# Patient Record
Sex: MALE | Race: BLACK OR AFRICAN AMERICAN | URBAN - METROPOLITAN AREA
[De-identification: names, ages, dates, MRNs, and addresses within clinical notes are randomized per-mention and may not be internally consistent; named-entity substitution may affect disease eponyms.]

---

## 2017-06-25 ENCOUNTER — INPATIENT (INPATIENT)
Facility: HOSPITAL | Age: 72
LOS: 7 days | Discharge: NURSING, REHAB OR LONG-TERM CARE | DRG: 65 | End: 2017-07-03
Attending: INTERNAL MEDICINE | Admitting: INTERNAL MEDICINE
Payer: COMMERCIAL

## 2017-06-25 VITALS
HEIGHT: 72 IN | SYSTOLIC BLOOD PRESSURE: 171 MMHG | OXYGEN SATURATION: 100 % | RESPIRATION RATE: 18 BRPM | DIASTOLIC BLOOD PRESSURE: 93 MMHG | HEART RATE: 72 BPM | WEIGHT: 210.1 LBS | TEMPERATURE: 99 F

## 2017-06-25 DIAGNOSIS — I63.9 CEREBRAL INFARCTION, UNSPECIFIED: ICD-10-CM

## 2017-06-25 DIAGNOSIS — I10 ESSENTIAL (PRIMARY) HYPERTENSION: ICD-10-CM

## 2017-06-25 DIAGNOSIS — Z29.9 ENCOUNTER FOR PROPHYLACTIC MEASURES, UNSPECIFIED: ICD-10-CM

## 2017-06-25 DIAGNOSIS — E11.9 TYPE 2 DIABETES MELLITUS WITHOUT COMPLICATIONS: ICD-10-CM

## 2017-06-25 LAB
ALBUMIN SERPL ELPH-MCNC: 3.5 G/DL — SIGNIFICANT CHANGE UP (ref 3.5–5)
ALP SERPL-CCNC: 85 U/L — SIGNIFICANT CHANGE UP (ref 40–120)
ALT FLD-CCNC: 19 U/L DA — SIGNIFICANT CHANGE UP (ref 10–60)
ANION GAP SERPL CALC-SCNC: 8 MMOL/L — SIGNIFICANT CHANGE UP (ref 5–17)
APPEARANCE UR: CLEAR — SIGNIFICANT CHANGE UP
APTT BLD: 31.8 SEC — SIGNIFICANT CHANGE UP (ref 27.5–37.4)
AST SERPL-CCNC: 16 U/L — SIGNIFICANT CHANGE UP (ref 10–40)
BASOPHILS # BLD AUTO: 0.1 K/UL — SIGNIFICANT CHANGE UP (ref 0–0.2)
BASOPHILS NFR BLD AUTO: 1.8 % — SIGNIFICANT CHANGE UP (ref 0–2)
BILIRUB SERPL-MCNC: 0.3 MG/DL — SIGNIFICANT CHANGE UP (ref 0.2–1.2)
BILIRUB UR-MCNC: NEGATIVE — SIGNIFICANT CHANGE UP
BUN SERPL-MCNC: 13 MG/DL — SIGNIFICANT CHANGE UP (ref 7–18)
CALCIUM SERPL-MCNC: 9.1 MG/DL — SIGNIFICANT CHANGE UP (ref 8.4–10.5)
CHLORIDE SERPL-SCNC: 107 MMOL/L — SIGNIFICANT CHANGE UP (ref 96–108)
CO2 SERPL-SCNC: 27 MMOL/L — SIGNIFICANT CHANGE UP (ref 22–31)
COLOR SPEC: YELLOW — SIGNIFICANT CHANGE UP
CREAT SERPL-MCNC: 1.02 MG/DL — SIGNIFICANT CHANGE UP (ref 0.5–1.3)
DIFF PNL FLD: ABNORMAL
EOSINOPHIL # BLD AUTO: 0.1 K/UL — SIGNIFICANT CHANGE UP (ref 0–0.5)
EOSINOPHIL NFR BLD AUTO: 2.4 % — SIGNIFICANT CHANGE UP (ref 0–6)
GLUCOSE SERPL-MCNC: 119 MG/DL — HIGH (ref 70–99)
GLUCOSE UR QL: NEGATIVE — SIGNIFICANT CHANGE UP
HCT VFR BLD CALC: 33.1 % — LOW (ref 39–50)
HGB BLD-MCNC: 11.6 G/DL — LOW (ref 13–17)
INR BLD: 1.04 RATIO — SIGNIFICANT CHANGE UP (ref 0.88–1.16)
KETONES UR-MCNC: NEGATIVE — SIGNIFICANT CHANGE UP
LEUKOCYTE ESTERASE UR-ACNC: NEGATIVE — SIGNIFICANT CHANGE UP
LYMPHOCYTES # BLD AUTO: 0.8 K/UL — LOW (ref 1–3.3)
LYMPHOCYTES # BLD AUTO: 13.8 % — SIGNIFICANT CHANGE UP (ref 13–44)
MCHC RBC-ENTMCNC: 32.2 PG — SIGNIFICANT CHANGE UP (ref 27–34)
MCHC RBC-ENTMCNC: 35.1 GM/DL — SIGNIFICANT CHANGE UP (ref 32–36)
MCV RBC AUTO: 91.7 FL — SIGNIFICANT CHANGE UP (ref 80–100)
MONOCYTES # BLD AUTO: 0.5 K/UL — SIGNIFICANT CHANGE UP (ref 0–0.9)
MONOCYTES NFR BLD AUTO: 8 % — SIGNIFICANT CHANGE UP (ref 2–14)
NEUTROPHILS # BLD AUTO: 4.5 K/UL — SIGNIFICANT CHANGE UP (ref 1.8–7.4)
NEUTROPHILS NFR BLD AUTO: 74.1 % — SIGNIFICANT CHANGE UP (ref 43–77)
NITRITE UR-MCNC: NEGATIVE — SIGNIFICANT CHANGE UP
PH UR: 7 — SIGNIFICANT CHANGE UP (ref 5–8)
PLATELET # BLD AUTO: 264 K/UL — SIGNIFICANT CHANGE UP (ref 150–400)
POTASSIUM SERPL-MCNC: 4 MMOL/L — SIGNIFICANT CHANGE UP (ref 3.5–5.3)
POTASSIUM SERPL-SCNC: 4 MMOL/L — SIGNIFICANT CHANGE UP (ref 3.5–5.3)
PROT SERPL-MCNC: 7.5 G/DL — SIGNIFICANT CHANGE UP (ref 6–8.3)
PROT UR-MCNC: 100
PROTHROM AB SERPL-ACNC: 11.3 SEC — SIGNIFICANT CHANGE UP (ref 9.8–12.7)
RBC # BLD: 3.61 M/UL — LOW (ref 4.2–5.8)
RBC # FLD: 13.1 % — SIGNIFICANT CHANGE UP (ref 10.3–14.5)
SODIUM SERPL-SCNC: 142 MMOL/L — SIGNIFICANT CHANGE UP (ref 135–145)
SP GR SPEC: 1.01 — SIGNIFICANT CHANGE UP (ref 1.01–1.02)
UROBILINOGEN FLD QL: NEGATIVE — SIGNIFICANT CHANGE UP
WBC # BLD: 6.1 K/UL — SIGNIFICANT CHANGE UP (ref 3.8–10.5)
WBC # FLD AUTO: 6.1 K/UL — SIGNIFICANT CHANGE UP (ref 3.8–10.5)

## 2017-06-25 PROCEDURE — 70450 CT HEAD/BRAIN W/O DYE: CPT | Mod: 26

## 2017-06-25 PROCEDURE — 99285 EMERGENCY DEPT VISIT HI MDM: CPT

## 2017-06-25 PROCEDURE — 99223 1ST HOSP IP/OBS HIGH 75: CPT | Mod: GC

## 2017-06-25 PROCEDURE — 71010: CPT | Mod: 26

## 2017-06-25 RX ORDER — INSULIN DETEMIR 100/ML (3)
24 INSULIN PEN (ML) SUBCUTANEOUS
Qty: 0 | Refills: 0 | Status: DISCONTINUED | OUTPATIENT
Start: 2017-06-25 | End: 2017-06-25

## 2017-06-25 RX ORDER — METFORMIN HYDROCHLORIDE 850 MG/1
1 TABLET ORAL
Qty: 0 | Refills: 0 | COMMUNITY

## 2017-06-25 RX ORDER — DEXTROSE 50 % IN WATER 50 %
12.5 SYRINGE (ML) INTRAVENOUS ONCE
Qty: 0 | Refills: 0 | Status: DISCONTINUED | OUTPATIENT
Start: 2017-06-25 | End: 2017-07-03

## 2017-06-25 RX ORDER — ATORVASTATIN CALCIUM 80 MG/1
40 TABLET, FILM COATED ORAL AT BEDTIME
Qty: 0 | Refills: 0 | Status: DISCONTINUED | OUTPATIENT
Start: 2017-06-25 | End: 2017-07-03

## 2017-06-25 RX ORDER — GLUCAGON INJECTION, SOLUTION 0.5 MG/.1ML
1 INJECTION, SOLUTION SUBCUTANEOUS ONCE
Qty: 0 | Refills: 0 | Status: DISCONTINUED | OUTPATIENT
Start: 2017-06-25 | End: 2017-07-03

## 2017-06-25 RX ORDER — DEXTROSE 50 % IN WATER 50 %
1 SYRINGE (ML) INTRAVENOUS ONCE
Qty: 0 | Refills: 0 | Status: DISCONTINUED | OUTPATIENT
Start: 2017-06-25 | End: 2017-07-03

## 2017-06-25 RX ORDER — INSULIN DETEMIR 100/ML (3)
24 INSULIN PEN (ML) SUBCUTANEOUS
Qty: 0 | Refills: 0 | COMMUNITY

## 2017-06-25 RX ORDER — ASPIRIN/CALCIUM CARB/MAGNESIUM 324 MG
81 TABLET ORAL DAILY
Qty: 0 | Refills: 0 | Status: DISCONTINUED | OUTPATIENT
Start: 2017-06-25 | End: 2017-06-25

## 2017-06-25 RX ORDER — GABAPENTIN 400 MG/1
300 CAPSULE ORAL EVERY 12 HOURS
Qty: 0 | Refills: 0 | Status: DISCONTINUED | OUTPATIENT
Start: 2017-06-25 | End: 2017-07-03

## 2017-06-25 RX ORDER — TAMSULOSIN HYDROCHLORIDE 0.4 MG/1
1 CAPSULE ORAL
Qty: 0 | Refills: 0 | COMMUNITY

## 2017-06-25 RX ORDER — AMLODIPINE BESYLATE 2.5 MG/1
10 TABLET ORAL DAILY
Qty: 0 | Refills: 0 | Status: DISCONTINUED | OUTPATIENT
Start: 2017-06-25 | End: 2017-07-01

## 2017-06-25 RX ORDER — INSULIN LISPRO 100/ML
VIAL (ML) SUBCUTANEOUS
Qty: 0 | Refills: 0 | Status: DISCONTINUED | OUTPATIENT
Start: 2017-06-25 | End: 2017-07-03

## 2017-06-25 RX ORDER — INSULIN GLARGINE 100 [IU]/ML
24 INJECTION, SOLUTION SUBCUTANEOUS
Qty: 0 | Refills: 0 | Status: DISCONTINUED | OUTPATIENT
Start: 2017-06-25 | End: 2017-07-03

## 2017-06-25 RX ORDER — CLOPIDOGREL BISULFATE 75 MG/1
1 TABLET, FILM COATED ORAL
Qty: 0 | Refills: 0 | COMMUNITY

## 2017-06-25 RX ORDER — GABAPENTIN 400 MG/1
0 CAPSULE ORAL
Qty: 0 | Refills: 0 | COMMUNITY

## 2017-06-25 RX ORDER — ENOXAPARIN SODIUM 100 MG/ML
40 INJECTION SUBCUTANEOUS DAILY
Qty: 0 | Refills: 0 | Status: DISCONTINUED | OUTPATIENT
Start: 2017-06-25 | End: 2017-07-03

## 2017-06-25 RX ORDER — FLUOXETINE HCL 10 MG
10 CAPSULE ORAL DAILY
Qty: 0 | Refills: 0 | Status: DISCONTINUED | OUTPATIENT
Start: 2017-06-25 | End: 2017-07-03

## 2017-06-25 RX ORDER — ASPIRIN/CALCIUM CARB/MAGNESIUM 324 MG
325 TABLET ORAL DAILY
Qty: 0 | Refills: 0 | Status: DISCONTINUED | OUTPATIENT
Start: 2017-06-25 | End: 2017-07-03

## 2017-06-25 RX ORDER — SODIUM CHLORIDE 9 MG/ML
1000 INJECTION, SOLUTION INTRAVENOUS
Qty: 0 | Refills: 0 | Status: DISCONTINUED | OUTPATIENT
Start: 2017-06-25 | End: 2017-07-03

## 2017-06-25 RX ORDER — DEXTROSE 50 % IN WATER 50 %
25 SYRINGE (ML) INTRAVENOUS ONCE
Qty: 0 | Refills: 0 | Status: DISCONTINUED | OUTPATIENT
Start: 2017-06-25 | End: 2017-07-03

## 2017-06-25 RX ORDER — ASPIRIN/CALCIUM CARB/MAGNESIUM 324 MG
81 TABLET ORAL ONCE
Qty: 0 | Refills: 0 | Status: DISCONTINUED | OUTPATIENT
Start: 2017-06-25 | End: 2017-06-25

## 2017-06-25 RX ORDER — DONEPEZIL HYDROCHLORIDE 10 MG/1
1 TABLET, FILM COATED ORAL
Qty: 0 | Refills: 0 | COMMUNITY

## 2017-06-25 RX ORDER — TAMSULOSIN HYDROCHLORIDE 0.4 MG/1
0.4 CAPSULE ORAL AT BEDTIME
Qty: 0 | Refills: 0 | Status: DISCONTINUED | OUTPATIENT
Start: 2017-06-25 | End: 2017-07-03

## 2017-06-25 RX ORDER — CLOPIDOGREL BISULFATE 75 MG/1
75 TABLET, FILM COATED ORAL DAILY
Qty: 0 | Refills: 0 | Status: DISCONTINUED | OUTPATIENT
Start: 2017-06-25 | End: 2017-07-03

## 2017-06-25 RX ORDER — DONEPEZIL HYDROCHLORIDE 10 MG/1
10 TABLET, FILM COATED ORAL AT BEDTIME
Qty: 0 | Refills: 0 | Status: DISCONTINUED | OUTPATIENT
Start: 2017-06-25 | End: 2017-07-03

## 2017-06-25 RX ORDER — PANTOPRAZOLE SODIUM 20 MG/1
40 TABLET, DELAYED RELEASE ORAL
Qty: 0 | Refills: 0 | Status: DISCONTINUED | OUTPATIENT
Start: 2017-06-25 | End: 2017-07-03

## 2017-06-25 RX ADMIN — Medication 10 MILLIGRAM(S): at 17:29

## 2017-06-25 RX ADMIN — TAMSULOSIN HYDROCHLORIDE 0.4 MILLIGRAM(S): 0.4 CAPSULE ORAL at 21:59

## 2017-06-25 RX ADMIN — CLOPIDOGREL BISULFATE 75 MILLIGRAM(S): 75 TABLET, FILM COATED ORAL at 17:29

## 2017-06-25 RX ADMIN — INSULIN GLARGINE 24 UNIT(S): 100 INJECTION, SOLUTION SUBCUTANEOUS at 22:28

## 2017-06-25 RX ADMIN — AMLODIPINE BESYLATE 10 MILLIGRAM(S): 2.5 TABLET ORAL at 17:29

## 2017-06-25 RX ADMIN — DONEPEZIL HYDROCHLORIDE 10 MILLIGRAM(S): 10 TABLET, FILM COATED ORAL at 22:01

## 2017-06-25 RX ADMIN — ATORVASTATIN CALCIUM 40 MILLIGRAM(S): 80 TABLET, FILM COATED ORAL at 21:59

## 2017-06-25 RX ADMIN — Medication 1: at 21:59

## 2017-06-25 RX ADMIN — Medication 325 MILLIGRAM(S): at 17:29

## 2017-06-25 NOTE — CONSULT NOTE ADULT - ATTENDING COMMENTS
Plan:  1) Brain MRI with DWI (no contrast).  2) Carotid Doppler study.  3) Echo-cardiogram (if was not done recently).  4) Increase ASA to 325 mg QD  5) Continue Plavix 75mg QD  6) Aggressive management of risk factors.  7) Physical therapy evaluation.  8) Neurology follow up.

## 2017-06-25 NOTE — ED PROVIDER NOTE - MEDICAL DECISION MAKING DETAILS
72 y/o M with left weakness since 12pm yesterday.  4/5 weakness on left upper and lower.  Not a tpa candidate due to timing.  Concern for stroke, plan for labs, cxr, ua, ct head, neuro consult, admit.

## 2017-06-25 NOTE — CONSULT NOTE ADULT - ASSESSMENT
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

## 2017-06-25 NOTE — CONSULT NOTE ADULT - SUBJECTIVE AND OBJECTIVE BOX
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   Patient had developed bilateral leg weakness for which he was evaluated at [facility] and discharged to rehab. From there he got a rolling walker to use  at home. Yesterday he started to feel numbness in his legs and today was not able to get up at all. He also has worsening of numbness and tingling of his fingers. Earlier in  his medications were adjusted including increased dose of metformin  for diabetes and atorvastatin in place of simvastatin for HLD.   He has a long standing history of GERD due to which he often misses his medications including last night. Otherwise denies any cough, chest pain, nausea, vomiting, abdominal pain and urinary complaints. No fall, loss of consciousness, slurred speech or seizure like activity.    Former smoker. 1PPD for 30 years before he quit 15 years ago. (2017 13:21)         Neurological Review of Systems:  No difficulty with language.  Legally blind.  No dizziness, vertigo or new hearing loss.  No difficulty with speech or swallowing.  Left sided weakness (lower more then upper).  No focal sensory changes.  No numbness or tingling in the bilateral lower extremities.  No difficulty with balance.  No difficulty with ambulation.        MEDICATIONS  (STANDING):  atorvastatin 40milliGRAM(s) Oral at bedtime  enoxaparin Injectable 40milliGRAM(s) SubCutaneous daily  insulin lispro (HumaLOG) corrective regimen sliding scale  SubCutaneous Before meals and at bedtime  dextrose 5%. 1000milliLiter(s) IV Continuous <Continuous>  dextrose 50% Injectable 12.5Gram(s) IV Push once  dextrose 50% Injectable 25Gram(s) IV Push once  dextrose 50% Injectable 25Gram(s) IV Push once  pantoprazole    Tablet 40milliGRAM(s) Oral before breakfast  tamsulosin 0.4milliGRAM(s) Oral at bedtime  gabapentin 300milliGRAM(s) Oral every 12 hours  FLUoxetine 10milliGRAM(s) Oral daily  clopidogrel Tablet 75milliGRAM(s) Oral daily  amLODIPine   Tablet 10milliGRAM(s) Oral daily  donepezil 10milliGRAM(s) Oral at bedtime  aspirin 325milliGRAM(s) Oral daily  insulin glargine Injectable (LANTUS) 24Unit(s) SubCutaneous two times a day  enalapril 10milliGRAM(s) Oral daily    MEDICATIONS  (PRN):  dextrose Gel 1Dose(s) Oral once PRN Blood Glucose LESS THAN 70 milliGRAM(s)/deciLiter  glucagon  Injectable 1milliGRAM(s) IntraMuscular once PRN Glucose <70 milliGRAM(s)/deciLiter    Allergies    No Known Allergies    Intolerances      PAST MEDICAL & SURGICAL HISTORY:  CVA (cerebral vascular accident)  Diabetes  No significant past surgical history    FAMILY HISTORY:    SOCIAL HISTORY: non smoker/ former smoker/ active smoker    Review of Systems:  Constitutional: No generalized weakness. No fevers or chills.                    Eyes, Ears, Mouth, Throat: No vision loss   Respiratory: No shortness of breath or cough.                                Cardiovascular: No chest pain or palpitations  Gastrointestinal: No nausea or vomiting.                                         Genitourinary: No urinary incontinence or burning on urination.  Musculoskeletal: No joint pain.                                                           Dermatologic: No rash.  Neurological: as per HPI                                                                      Psychiatric: No behavioral problems.  Endocrine: No known hypoglycemia.               Hematologic/Lymphatic: No easy bleeding.    O:  Vital Signs Last 24 Hrs  T(C): 36.9, Max: 37 (06-25 @ 08:30)  T(F): 98.4, Max: 98.6 (06-25 @ 08:30)  HR: 73 (69 - 73)  BP: 157/70 (157/70 - 183/76)  BP(mean): --  RR: 18 (18 - 18)  SpO2: 98% (98% - 100%)    General Exam:   General appearance: Right handed male in no acute distress                 Cardiovascular: Pedal dorsalis pulses intact bilaterally    Mental Status: Orientated to self, date and place.  Attention intact.  No dysarthria, aphasia or neglect.  Knowledge intact.  Registration intact.  Short and long term memory grossly intact.      Cranial Nerves: CN I - not tested.  PERRL, EOMI, VFF, no nystagmus or diplopia.  No APD.  Fundi not visualized.  CN V1-3 intact to light touch and pinprick.  No facial asymmetry.  Hearing intact to finger rub bilaterally.  Tongue, uvula and palate midline.  Sternocleidomastoid and Trapezius intact bilaterally.    Motor:   Tone: normal.                  Strength intact throughout in the right upper extremities, and 5-/5 in the left upper extremity, and 4/5 in the left lower extremity.  Pronator drift on the left  No dysmetria on finger-nose-finger or heel-shin-heel  No truncal ataxia.  No resting, postural or action tremor.  No myoclonus.    Sensation: intact to light touch, pinprick, vibration and proprioception    Deep Tendon Reflexes: 1+ on the right and 3 on the left biceps, triceps, brachioradialis, knee and ankle  Toes flexor on the right and extensor on the left.    Gait: deferred.    Other:     LABS:                        11.6   6.1   )-----------( 264      ( 2017 09:08 )             33.1         142  |  107  |  13  ----------------------------<  119<H>  4.0   |  27  |  1.02    Ca    9.1      2017 09:08    TPro  7.5  /  Alb  3.5  /  TBili  0.3  /  DBili  x   /  AST  16  /  ALT  19  /  AlkPhos  85  -25    PT/INR - ( 2017 09:08 )   PT: 11.3 sec;   INR: 1.04 ratio         PTT - ( 2017 09:08 )  PTT:31.8 sec  Urinalysis Basic - ( 2017 13:05 )    Color: Yellow / Appearance: Clear / S.010 / pH: x  Gluc: x / Ketone: Negative  / Bili: Negative / Urobili: Negative   Blood: x / Protein: 100 / Nitrite: Negative   Leuk Esterase: Negative / RBC: 0-2 /HPF / WBC 0-2 /HPF   Sq Epi: x / Non Sq Epi: Few / Bacteria: Trace /HPF    RADIOLOGY & ADDITIONAL STUDIES:  CT scan on the brain - IMPRESSION: No acute hemorrhage or evidence of acute transcortical infarct.  Chronic infarcts involving the right frontal lobe and left cerebellum.    EKG:  tele:  TTE:  EEG:

## 2017-06-25 NOTE — H&P ADULT - ASSESSMENT
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 He also has worsening of numbness and tingling of his fingers. Earlier in June his medications were adjusted including increased dose of metformin  for diabetes and atorvastatin in place of simvastatin for HLD.   Patient had developed bilateral leg weakness for which he was evaluated at [facility] and discharged to rehab. From there he got a rolling walker to use  at home. Yesterday he started to feel numbness in his legs and today was not able to get up at all.   He has a long standing history of GERD due to which he often misses his medications including last night. Otherwise denies any cough, chest pain, nausea, vomiting, abdominal pain and urinary complaints.  Former smoker. 1PPD for 30 years before he quit 15 years ago.

## 2017-06-25 NOTE — H&P ADULT - NSHPLABSRESULTS_GEN_ALL_CORE
Vital Signs Last 24 Hrs  T(C): 36.9, Max: 37 (06-25 @ 08:30)  T(F): 98.4, Max: 98.6 (06-25 @ 08:30)  HR: 69 (69 - 72)  BP: 183/76 (171/93 - 183/76)  BP(mean): --  RR: 18 (18 - 18)  SpO2: 100% (100% - 100%)    CT head:   There is no CT evidence of acute territorial infarct. Age-related   involutional changes and microvascular ischemic changes are seen.    Old infarcts are seen in the right frontal lobe with associated   encephalomalacia resulting in ex vacuo dilatation of the right frontal   horn as well as an old infarct in the left cerebellum.    There is no hydrocephalus, mass effect, midline shift, or acute   intracranial hemorrhage. No extra-axial collection.       The visualized paranasal sinuses and mastoid air cells are clear.    The calvarium is intact. Bilateral cataract surgery.

## 2017-06-25 NOTE — H&P ADULT - HISTORY OF PRESENT ILLNESS
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 He also has worsening of numbness and tingling of his fingers. Earlier in June his medications were adjusted including increased dose of metformin  for diabetes and atorvastatin in place of simvastatin for HLD.   Patient had developed bilateral leg weakness for which he was evaluated at [facility] and discharged to rehab. From there he got a rolling walker to use  at home. Yesterday he started to feel numbness in his legs and today was not able to get up at all.   He has a long standing history of GERD due to which he often misses his medications including last night. Otherwise denies any cough, chest pain, nausea, vomiting, abdominal pain and urinary complaints.  Former smoker. 1PPD for 30 years before he quit 15 years ago. 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   Patient had developed bilateral leg weakness for which he was evaluated at [facility] and discharged to rehab. From there he got a rolling walker to use  at home. Yesterday he started to feel numbness in his legs and today was not able to get up at all. He also has worsening of numbness and tingling of his fingers. Earlier in June his medications were adjusted including increased dose of metformin  for diabetes and atorvastatin in place of simvastatin for HLD.   He has a long standing history of GERD due to which he often misses his medications including last night. Otherwise denies any cough, chest pain, nausea, vomiting, abdominal pain and urinary complaints. No fall, loss of consciousness, slurred speech or seizure like activity.    Former smoker. 1PPD for 30 years before he quit 15 years ago.

## 2017-06-25 NOTE — H&P ADULT - PROBLEM SELECTOR PLAN 1
- patient has recurrent strokes likely sec to poor compliance due to GERD   - will continue aspirin (per neurology increase the dose to 325 daily)   - will continue Plavix   - continue atorvastatin   - will admit to telemetry to r/o arrythmia   - will get MRI head and US carotids   - will continue diet as patient able to swallow easily   - Neurology: Dr He

## 2017-06-25 NOTE — H&P ADULT - PROBLEM SELECTOR PLAN 3
- continue permissive hypertension upto systolic 180 for 24 hours as symptoms started at 12 pm yesterday.   - resume home meds after out of window

## 2017-06-25 NOTE — H&P ADULT - ATTENDING COMMENTS
I interviewed and examined this patient at the bedside with Dr. Mcdonald.   He has a history of DM and of stroke, with ? bilateral LE weakness.   He has a one to two day onset of left-sided weakness.  He is legally blind.   Cooperative 70 yo man in NAD  limited vision  Left weakness 3/5  IMP:  Possible recurrent stroke.   Plan:  As above.            Neurology consult.

## 2017-06-25 NOTE — PATIENT PROFILE ADULT. - VISION (WITH CORRECTIVE LENSES IF THE PATIENT USUALLY WEARS THEM):
Normal vision: sees adequately in most situations; can see medication labels, newsprint legally blind/Partially impaired: cannot see medication labels or newsprint, but can see obstacles in path, and the surrounding layout; can count fingers at arm's length

## 2017-06-25 NOTE — ED ADULT NURSE NOTE - OBJECTIVE STATEMENT
patient alert and verbally responsive with PMH CVA BIBA from home c/o L leg weakness Pt reports ambulates with assistance since yesterday has difficulty to bearing weight on LLE denies any pain dizziness patient alert and verbally responsive with PMH CVA BIBA from home co  L sided weakness Pt reports ambulates with assistance since yesterday has difficulty in walking and bearing weight , denies any pain dizziness

## 2017-06-25 NOTE — ED PROVIDER NOTE - OBJECTIVE STATEMENT
70 y/o M with h/o htn, dm, previous stroke in April 2017 (bilat lower ext weakness, seen at [facility], recently home from rehab with rolling walker) here with left sided weakness since 12pm yesterday.    PMD Dr Malathi Yeung 72 y/o M with h/o htn, dm, previous stroke in April 2017 (bilat lower ext weakness, seen at [facility], recently home from rehab with rolling walker) here with left sided weakness since 12pm yesterday.  Pt states that secondary to weakness he is unable to stand or ambulate.  No other complaints.      PMD Dr Malathi Yeung

## 2017-06-26 ENCOUNTER — TRANSCRIPTION ENCOUNTER (OUTPATIENT)
Age: 72
End: 2017-06-26

## 2017-06-26 DIAGNOSIS — F03.90 UNSPECIFIED DEMENTIA, UNSPECIFIED SEVERITY, WITHOUT BEHAVIORAL DISTURBANCE, PSYCHOTIC DISTURBANCE, MOOD DISTURBANCE, AND ANXIETY: ICD-10-CM

## 2017-06-26 LAB
ALBUMIN SERPL ELPH-MCNC: 3.3 G/DL — LOW (ref 3.5–5)
ALP SERPL-CCNC: 70 U/L — SIGNIFICANT CHANGE UP (ref 40–120)
ALT FLD-CCNC: 18 U/L DA — SIGNIFICANT CHANGE UP (ref 10–60)
ANION GAP SERPL CALC-SCNC: 6 MMOL/L — SIGNIFICANT CHANGE UP (ref 5–17)
AST SERPL-CCNC: 16 U/L — SIGNIFICANT CHANGE UP (ref 10–40)
BASOPHILS # BLD AUTO: 0.1 K/UL — SIGNIFICANT CHANGE UP (ref 0–0.2)
BASOPHILS NFR BLD AUTO: 1.1 % — SIGNIFICANT CHANGE UP (ref 0–2)
BILIRUB SERPL-MCNC: 0.3 MG/DL — SIGNIFICANT CHANGE UP (ref 0.2–1.2)
BUN SERPL-MCNC: 19 MG/DL — HIGH (ref 7–18)
CALCIUM SERPL-MCNC: 8.9 MG/DL — SIGNIFICANT CHANGE UP (ref 8.4–10.5)
CHLORIDE SERPL-SCNC: 106 MMOL/L — SIGNIFICANT CHANGE UP (ref 96–108)
CHOLEST SERPL-MCNC: 141 MG/DL — SIGNIFICANT CHANGE UP (ref 10–199)
CO2 SERPL-SCNC: 27 MMOL/L — SIGNIFICANT CHANGE UP (ref 22–31)
CREAT SERPL-MCNC: 0.83 MG/DL — SIGNIFICANT CHANGE UP (ref 0.5–1.3)
EOSINOPHIL # BLD AUTO: 0.1 K/UL — SIGNIFICANT CHANGE UP (ref 0–0.5)
EOSINOPHIL NFR BLD AUTO: 2.3 % — SIGNIFICANT CHANGE UP (ref 0–6)
GLUCOSE SERPL-MCNC: 152 MG/DL — HIGH (ref 70–99)
HBA1C BLD-MCNC: 8 % — HIGH (ref 4–5.6)
HCT VFR BLD CALC: 32.8 % — LOW (ref 39–50)
HDLC SERPL-MCNC: 60 MG/DL — SIGNIFICANT CHANGE UP (ref 40–125)
HGB BLD-MCNC: 11.2 G/DL — LOW (ref 13–17)
LIPID PNL WITH DIRECT LDL SERPL: 66 MG/DL — SIGNIFICANT CHANGE UP
LYMPHOCYTES # BLD AUTO: 1.2 K/UL — SIGNIFICANT CHANGE UP (ref 1–3.3)
LYMPHOCYTES # BLD AUTO: 19.9 % — SIGNIFICANT CHANGE UP (ref 13–44)
MAGNESIUM SERPL-MCNC: 2 MG/DL — SIGNIFICANT CHANGE UP (ref 1.6–2.6)
MCHC RBC-ENTMCNC: 31.5 PG — SIGNIFICANT CHANGE UP (ref 27–34)
MCHC RBC-ENTMCNC: 34.2 GM/DL — SIGNIFICANT CHANGE UP (ref 32–36)
MCV RBC AUTO: 92.2 FL — SIGNIFICANT CHANGE UP (ref 80–100)
MONOCYTES # BLD AUTO: 0.5 K/UL — SIGNIFICANT CHANGE UP (ref 0–0.9)
MONOCYTES NFR BLD AUTO: 7.8 % — SIGNIFICANT CHANGE UP (ref 2–14)
NEUTROPHILS # BLD AUTO: 4.1 K/UL — SIGNIFICANT CHANGE UP (ref 1.8–7.4)
NEUTROPHILS NFR BLD AUTO: 68.9 % — SIGNIFICANT CHANGE UP (ref 43–77)
PHOSPHATE SERPL-MCNC: 3.7 MG/DL — SIGNIFICANT CHANGE UP (ref 2.5–4.5)
PLATELET # BLD AUTO: 269 K/UL — SIGNIFICANT CHANGE UP (ref 150–400)
POTASSIUM SERPL-MCNC: 3.9 MMOL/L — SIGNIFICANT CHANGE UP (ref 3.5–5.3)
POTASSIUM SERPL-SCNC: 3.9 MMOL/L — SIGNIFICANT CHANGE UP (ref 3.5–5.3)
PROT SERPL-MCNC: 6.8 G/DL — SIGNIFICANT CHANGE UP (ref 6–8.3)
RBC # BLD: 3.56 M/UL — LOW (ref 4.2–5.8)
RBC # FLD: 13 % — SIGNIFICANT CHANGE UP (ref 10.3–14.5)
SODIUM SERPL-SCNC: 139 MMOL/L — SIGNIFICANT CHANGE UP (ref 135–145)
TOTAL CHOLESTEROL/HDL RATIO MEASUREMENT: 2.4 RATIO — LOW (ref 3.4–9.6)
TRIGL SERPL-MCNC: 73 MG/DL — SIGNIFICANT CHANGE UP (ref 10–149)
TSH SERPL-MCNC: 1.3 UU/ML — SIGNIFICANT CHANGE UP (ref 0.34–4.82)
VIT B12 SERPL-MCNC: 536 PG/ML — SIGNIFICANT CHANGE UP (ref 243–894)
WBC # BLD: 6 K/UL — SIGNIFICANT CHANGE UP (ref 3.8–10.5)
WBC # FLD AUTO: 6 K/UL — SIGNIFICANT CHANGE UP (ref 3.8–10.5)

## 2017-06-26 PROCEDURE — 99233 SBSQ HOSP IP/OBS HIGH 50: CPT | Mod: GC

## 2017-06-26 PROCEDURE — 93880 EXTRACRANIAL BILAT STUDY: CPT | Mod: 26

## 2017-06-26 PROCEDURE — 70551 MRI BRAIN STEM W/O DYE: CPT | Mod: 26

## 2017-06-26 RX ORDER — SENNA PLUS 8.6 MG/1
2 TABLET ORAL AT BEDTIME
Qty: 0 | Refills: 0 | Status: DISCONTINUED | OUTPATIENT
Start: 2017-06-26 | End: 2017-07-03

## 2017-06-26 RX ORDER — DOCUSATE SODIUM 100 MG
100 CAPSULE ORAL DAILY
Qty: 0 | Refills: 0 | Status: DISCONTINUED | OUTPATIENT
Start: 2017-06-26 | End: 2017-07-03

## 2017-06-26 RX ORDER — POLYETHYLENE GLYCOL 3350 17 G/17G
17 POWDER, FOR SOLUTION ORAL DAILY
Qty: 0 | Refills: 0 | Status: COMPLETED | OUTPATIENT
Start: 2017-06-26 | End: 2017-06-29

## 2017-06-26 RX ADMIN — Medication 100 MILLIGRAM(S): at 21:38

## 2017-06-26 RX ADMIN — ATORVASTATIN CALCIUM 40 MILLIGRAM(S): 80 TABLET, FILM COATED ORAL at 21:38

## 2017-06-26 RX ADMIN — PANTOPRAZOLE SODIUM 40 MILLIGRAM(S): 20 TABLET, DELAYED RELEASE ORAL at 06:35

## 2017-06-26 RX ADMIN — Medication 1: at 09:02

## 2017-06-26 RX ADMIN — DONEPEZIL HYDROCHLORIDE 10 MILLIGRAM(S): 10 TABLET, FILM COATED ORAL at 21:38

## 2017-06-26 RX ADMIN — INSULIN GLARGINE 24 UNIT(S): 100 INJECTION, SOLUTION SUBCUTANEOUS at 11:59

## 2017-06-26 RX ADMIN — ENOXAPARIN SODIUM 40 MILLIGRAM(S): 100 INJECTION SUBCUTANEOUS at 11:59

## 2017-06-26 RX ADMIN — GABAPENTIN 300 MILLIGRAM(S): 400 CAPSULE ORAL at 06:35

## 2017-06-26 RX ADMIN — CLOPIDOGREL BISULFATE 75 MILLIGRAM(S): 75 TABLET, FILM COATED ORAL at 11:59

## 2017-06-26 RX ADMIN — Medication 10 MILLIGRAM(S): at 16:48

## 2017-06-26 RX ADMIN — SENNA PLUS 2 TABLET(S): 8.6 TABLET ORAL at 21:38

## 2017-06-26 RX ADMIN — TAMSULOSIN HYDROCHLORIDE 0.4 MILLIGRAM(S): 0.4 CAPSULE ORAL at 21:38

## 2017-06-26 RX ADMIN — Medication: at 21:42

## 2017-06-26 RX ADMIN — GABAPENTIN 300 MILLIGRAM(S): 400 CAPSULE ORAL at 18:15

## 2017-06-26 RX ADMIN — INSULIN GLARGINE 24 UNIT(S): 100 INJECTION, SOLUTION SUBCUTANEOUS at 21:38

## 2017-06-26 RX ADMIN — Medication 325 MILLIGRAM(S): at 11:59

## 2017-06-26 NOTE — DISCHARGE NOTE ADULT - HOSPITAL COURSE
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 Admitted for stroke     CVA (cerebral vascular accident).  Acute stroke in right janel . Continue aspirin 325 mg, plavix 75 mg, atorvastatin 40mg . Monitored on Telemetry to rule out arrhythmias, shows  PVCs. Lipid profile / TSH/ wnl , f/up on HBA1c. Carotid doppler negative for any stenosis. ECHO mild left atrial enlargemnt , grade 1 DD, PT consult --------------------------.  SSS evaluation done, recommended to continue the current diet with some supervision as patient is legally blind . Neurology: Dr He. For Diabetes.   C/w Lantus 24 Units two times/ day ( home dose) , HBA1c -----------------. Blood sugar well controlled . Holded home meds.  C/w gabapentin at home dose. Has Hypertension. C/w amlodipine 10 mg , vasotec 10 daily. BP currently well controlled. For prophylaxis with IMPROVE VTE is 2, lovenox and protonix given. For dementia.  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 Admitted for stroke     Admitted for CVA (cerebral vascular accident). Has Acute stroke in right janel . Continue aspirin 325 mg, plavix 75 mg, atorvastatin 40mg . Monitored on Telemetry to rule out arrhythmias, shows  PVCs, no arrythmia noted.. Lipid profile / TSH/ wnl , HBA1c 8.  Carotid doppler negative for any stenosis. ECHO mild left atrial enlargement , grade 1 DD, PT consult recommended the acute rehab.  SSS evaluation done, recommended to continue the current diet with some supervision as patient is legally blind . Neurology: Dr He. For Diabetes home dose of  Lantus 24 Units two times/ day continued. , HBA1c is 8.. Blood sugar well controlled . Holded home oral meds.  Given gabapentin at home dose. Has Hypertension. Given amlodipine 10 mg , Vasotec 10 daily. BP slightly elevated so dose of enlapril increased.  For prophylaxis with IMPROVE VTE is 2, Lovenox and Protonix given. For dementia home meds Aricept 10 mg continued.     Patient was given the counselling about the control of hypertension and diabetes, patient verbalized the understanding. He is stable to be discharged to rehab. Plan discussed with the attending. 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 Admitted for stroke     Admitted for CVA (cerebral vascular accident). Has Acute stroke in right janel . Continue aspirin 325 mg, plavix 75 mg, atorvastatin 40mg . Monitored on Telemetry to rule out arrhythmias, shows  PVCs, no arrythmia noted.. Lipid profile / TSH/ wnl , HBA1c 8.  Carotid doppler negative for any stenosis. ECHO mild left atrial enlargement , grade 1 DD, PT consult recommended the acute rehab.  SSS evaluation done, recommended to continue the current diet with some supervision as patient is legally blind . Neurology: Dr He. For Diabetes home dose of  Lantus 24 Units two times/ day continued. , HBA1c is 8.. Blood sugar well controlled . Holded home oral meds.  Given gabapentin at home dose. Has Hypertension. Given amlodipine 10 mg , Vasotec 10 daily. .  For prophylaxis with IMPROVE VTE is 2, Lovenox and Protonix given. For dementia home meds Aricept 10 mg continued.     Patient was given the counselling about the control of hypertension and diabetes, patient verbalized the understanding. He is stable to be discharged to rehab. Plan discussed with the attending.

## 2017-06-26 NOTE — DISCHARGE NOTE ADULT - NS AS DC STROKE ED MATERIALS
Stroke Education Booklet/Need for Followup After Discharge/Prescribed Medications/Call 911 for Stroke/Risk Factors for Stroke/Stroke Warning Signs and Symptoms

## 2017-06-26 NOTE — PROGRESS NOTE ADULT - ATTENDING COMMENTS
I have seen and evaluated this patient at the bedside.  He is alert. CT  shows multiple old infarcts.  R/O new infarct as a cause of his deficit.     EXAM:  CT BRAIN                        PROCEDURE DATE:  06/25/2017    INTERPRETATION:  CLINICAL INDICATION:  Left sided weakness x 1 day.   Evaluate for CVA. History of CVA 2 years ago.  TECHNIQUE : Axial CT scanning of the brain was obtained from the skull   base to the vertex without the administration of intravenous contrast.   Coronal and sagittal reformatted images were subsequently obtained.     COMPARISON: CT brain 5/13/2015.    FINDINGS:      There is no CT evidence of acute territorial infarct. Age-related   involutional changes and microvascular ischemic changes are seen.    Old infarcts are seen in the right frontal lobe with associated   encephalomalacia resulting in ex vacuo dilatation of the right frontal   horn as well as an old infarct in the left cerebellum.    There is no hydrocephalus, mass effect, midline shift, or acute   intracranial hemorrhage. No extra-axial collection.       The visualized paranasal sinuses and mastoid air cells are clear.    The calvarium is intact. Bilateral cataract surgery.    IMPRESSION:    No acute hemorrhage or evidence of acute transcortical infarct.    Chronic infarcts involving the right frontal lobe and left cerebellum.      Echo: ------------------------------------------------------------------------  CONCLUSIONS:  1. Normal mitral valve.  2. Calcified trileaflet aortic valve with decreased  opening.  3. Normal aortic root.  4. Mild left atrial enlargement.  5. Moderate concentric left ventricular hypertrophy.  6. Normal Left Ventricular Systolic Function,  (EF = 55 to  60%)  7. Grade I diastolic dysfunction  8. Normal right atrium.  9. Normal right ventricular size and function.  10. Unable to estimate RVSP.  11. Normal tricuspid valve.  12. Normal pulmonic valve.  13. Normal pericardium with no pericardial effusion.    Neurology consultation, seen and appreciated.     IMP:  Multiple CNS infarcts.    Plan:  MRI, PT consult, Secondary prevention, as recommended.            SW consultation.

## 2017-06-26 NOTE — PROGRESS NOTE ADULT - PROBLEM SELECTOR PLAN 1
- Acute stroke in right janel   - Continue aspirin 325 mg, plavix 75 mg, atorvastatin 40mg   - Telemetry: PVCs  - Lipid profile / TSH/ wnl , f/up on HBA1c  - F/up on carotid doppler, echo, PT consult   - SSS evaluation done, recommended to continue the current diet with some supervision as patient is legally blind   - Neurology: Dr He

## 2017-06-26 NOTE — DISCHARGE NOTE ADULT - CARE PROVIDER_API CALL
Dr Espinoza Leslie  Phone: (   )    -  Fax: (   )    - Dr Espinoza Leslie  Phone: (   )    -  Fax: (   )    -    Luz Maria Tinajero (MD), Neurology  [address] [address] [place] [address]  Phone: [phone]  Fax: [phone]

## 2017-06-26 NOTE — DISCHARGE NOTE ADULT - PATIENT PORTAL LINK FT
“You can access the Follow[facility] Patient Portal, offered by [facility], by registering with the following website: [email or web address]”

## 2017-06-26 NOTE — DISCHARGE NOTE ADULT - PROVIDER TOKENS
FREE:[LAST:[Anuj],FIRST:[Dr Doran],PHONE:[(   )    -],FAX:[(   )    -]] FREE:[LAST:[Anuj],FIRST:[Dr Doran],PHONE:[(   )    -],FAX:[(   )    -]],TOKEN:'[id number]'

## 2017-06-26 NOTE — PROGRESS NOTE ADULT - PROBLEM SELECTOR PLAN 2
- C/w Lantus 24 Units two times/ day ( home dose)   - F/up on HBA1c   - Blood sugar well controlled   - Holded home meds  - C/w gabapentin at home dose

## 2017-06-26 NOTE — DISCHARGE NOTE ADULT - CARE PLAN
Principal Discharge DX:	Stroke  Secondary Diagnosis:	Hypertension  Secondary Diagnosis:	Diabetes  Secondary Diagnosis:	Dementia Principal Discharge DX:	Stroke  Goal:	prevent the further stroke  Instructions for follow-up, activity and diet:	You came with weakness, later found to have acute right ARELI stroke , monitored on telemetry , no arrythmia noted, given the aspirin 325 mg , Plavix, Lipitor 40 mg. Physical therapy recommended for acute rehab, patient need to control the risk factors like HTN and diabetes , outpatient f/up with PCP recommended. You can resume the 81 mg of aspirin daily.  Secondary Diagnosis:	Hypertension  Goal:	Keep the BP around the 130/80  Instructions for follow-up, activity and diet:	You home med amlodipine and enalapril given, later since your BP high , enalapril increased to 20 mg daily. Take the DASH/TLC diet, since you have multiple strokes your BP need to be controlled.  Secondary Diagnosis:	Diabetes  Goal:	keep the fingersticks  Instructions for follow-up, activity and diet:	Your oral home medications kept on hold, given the same dose of lantus at home , blood sugar well controlled. HBA1c is 8, you can take the home meds , outpatient f/up with PCP recommended.  Secondary Diagnosis:	Dementia  Goal:	Continue with suppotive care  Instructions for follow-up, activity and diet:	Your home med continued. Principal Discharge DX:	Stroke  Goal:	prevent the further stroke  Instructions for follow-up, activity and diet:	You came with weakness, later found to have acute right ARELI stroke , monitored on telemetry , no arrythmia noted, given the aspirin 325 mg , Plavix, Lipitor 40 mg. Physical therapy recommended for acute rehab, patient need to control the risk factors like HTN and diabetes , outpatient f/up with PCP recommended.  Secondary Diagnosis:	Hypertension  Goal:	Keep the BP around the 130/80  Instructions for follow-up, activity and diet:	You home med amlodipine and enalapril given. Take the DASH/TLC diet, since you have multiple strokes your BP need to be controlled.  Secondary Diagnosis:	Diabetes  Goal:	keep the fingersticks  Instructions for follow-up, activity and diet:	Your oral home medications kept on hold, given the same dose of lantus at home , blood sugar well controlled. HBA1c is 8, you can take the home meds , outpatient f/up with PCP recommended.  Secondary Diagnosis:	Dementia  Goal:	Continue with suppotive care  Instructions for follow-up, activity and diet:	Your home med continued. Principal Discharge DX:	Stroke  Goal:	prevent the further stroke  Instructions for follow-up, activity and diet:	You came with weakness, later found to have acute right ARELI stroke , monitored on telemetry , no arrythmia noted, given the aspirin 325 mg , Plavix, Lipitor 40 mg. Physical therapy recommended for acute rehab, patient need to control the risk factors like HTN and diabetes , outpatient f/up with PCP recommended. Needs MRA outpatient, and neuro f/up in 2 weeks.  Secondary Diagnosis:	Hypertension  Goal:	Keep the BP around the 130/80  Instructions for follow-up, activity and diet:	You home med amlodipine and enalapril given. Take the DASH/TLC diet, since you have multiple strokes your BP need to be controlled.  Secondary Diagnosis:	Diabetes  Goal:	keep the fingersticks  Instructions for follow-up, activity and diet:	Your oral home medications kept on hold, given the same dose of lantus at home , blood sugar well controlled. HBA1c is 8, you can take the home meds , outpatient f/up with PCP recommended.  Secondary Diagnosis:	Dementia  Goal:	Continue with suppotive care  Instructions for follow-up, activity and diet:	Your home med continued. Principal Discharge DX:	Stroke  Goal:	prevent the further stroke  Instructions for follow-up, activity and diet:	You came with weakness, later found to have acute right ARELI stroke , monitored on telemetry , no arrythmia noted, given the aspirin 325 mg , Plavix, Lipitor 40 mg. Physical therapy recommended for acute rehab, patient need to control the risk factors like HTN and diabetes , outpatient f/up with PCP recommended. Needs MRA outpatient, and neuro f/up in 2 weeks.  Secondary Diagnosis:	Hypertension  Goal:	Keep the BP around the 130/80  Instructions for follow-up, activity and diet:	You home med amlodipine and enalapril given. Take the DASH/TLC diet, since you have multiple strokes your BP need to be controlled.  Secondary Diagnosis:	Diabetes  Goal:	keep the fingersticks  Instructions for follow-up, activity and diet:	Your oral home medications kept on hold, given the same dose of lantus at home , blood sugar well controlled. HBA1c is 8, you can take the home meds , outpatient f/up with PCP recommended.  Secondary Diagnosis:	Dementia  Goal:	Continue with supportive care  Instructions for follow-up, activity and diet:	Your home med continued.

## 2017-06-26 NOTE — PROGRESS NOTE ADULT - ASSESSMENT
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 Admitted for stroke

## 2017-06-26 NOTE — DISCHARGE NOTE ADULT - MEDICATION SUMMARY - MEDICATIONS TO TAKE
I will START or STAY ON the medications listed below when I get home from the hospital:    enalapril 10 mg oral tablet  -- 1 tab(s) by mouth once a day  -- Indication: For Hypertension    tamsulosin 0.4 mg oral capsule  -- 1 cap(s) by mouth once a day  -- Indication: For BPH    gabapentin 300 mg oral tablet  --  by mouth 2 times a day  -- Indication: For Diabetes    FLUoxetine 10 mg oral tablet  -- 1 tab(s) by mouth once a day  -- Indication: For Depressison    metFORMIN 1000 mg oral tablet  -- 1 tab(s) by mouth 2 times a day  -- Indication: For Diabetes    Januvia 100 mg oral tablet  -- 1 tab(s) by mouth once a day  -- Indication: For Diabetes    Levemir 100 units/mL subcutaneous solution  -- 24 unit(s) subcutaneous 2 times a day  -- Indication: For Diabetes    Lipitor 40 mg oral tablet  -- 1 tab(s) by mouth once a day  -- Avoid grapefruit and grapefruit juice while taking this medication.  Do not take this drug if you are pregnant.  It is very important that you take or use this exactly as directed.  Do not skip doses or discontinue unless directed by your doctor.  Obtain medical advice before taking any non-prescription drugs as some may affect the action of this medication.  Take with food or milk.    -- Indication: For Stroke    Plavix 75 mg oral tablet  -- 1 tab(s) by mouth once a day  -- Indication: For Stroke    amLODIPine 10 mg oral tablet  -- 1 tab(s) by mouth once a day  -- Indication: For Hypertension    donepezil 10 mg oral tablet  -- 1 tab(s) by mouth once a day (at bedtime)  -- Indication: For Dementia    Probiotic Formula oral capsule  -- 1 cap(s) by mouth once a day  -- Indication: For Supplements I will START or STAY ON the medications listed below when I get home from the hospital:    aspirin 325 mg oral tablet  -- 1 tab(s) by mouth once a day  -- Indication: For Stroke    enalapril 10 mg oral tablet  -- 1 tab(s) by mouth once a day  -- Indication: For Hypertension    tamsulosin 0.4 mg oral capsule  -- 1 cap(s) by mouth once a day  -- Indication: For BPH    gabapentin 300 mg oral tablet  --  by mouth 2 times a day  -- Indication: For Diabetes    FLUoxetine 10 mg oral tablet  -- 1 tab(s) by mouth once a day  -- Indication: For Depressison    metFORMIN 1000 mg oral tablet  -- 1 tab(s) by mouth 2 times a day  -- Indication: For Diabetes    Levemir 100 units/mL subcutaneous solution  -- 24 unit(s) subcutaneous 2 times a day  -- Indication: For Diabetes    Januvia 100 mg oral tablet  -- 1 tab(s) by mouth once a day  -- Indication: For Diabetes    Lipitor 40 mg oral tablet  -- 1 tab(s) by mouth once a day  -- Avoid grapefruit and grapefruit juice while taking this medication.  Do not take this drug if you are pregnant.  It is very important that you take or use this exactly as directed.  Do not skip doses or discontinue unless directed by your doctor.  Obtain medical advice before taking any non-prescription drugs as some may affect the action of this medication.  Take with food or milk.    -- Indication: For Stroke    Plavix 75 mg oral tablet  -- 1 tab(s) by mouth once a day  -- Indication: For Stroke    amLODIPine 10 mg oral tablet  -- 1 tab(s) by mouth once a day  -- Indication: For Hypertension    donepezil 10 mg oral tablet  -- 1 tab(s) by mouth once a day (at bedtime)  -- Indication: For Dementia    Probiotic Formula oral capsule  -- 1 cap(s) by mouth once a day  -- Indication: For Supplements

## 2017-06-26 NOTE — PROGRESS NOTE ADULT - SUBJECTIVE AND OBJECTIVE BOX
Patient is a 71y old  Male who presents with a chief complaint of Worsening leg weakness (2017 07:17)      INTERVAL HPI/OVERNIGHT EVENTS: no new complaints      REVIEW OF SYSTEMS:    CONSTITUTIONAL: No fever,   EYES: blurry vision from both eyes , legally blind  NECK: No pain or stiffness  RESPIRATORY: No cough; No shortness of breath  CARDIOVASCULAR: No chest pain, no palpitations  GASTROINTESTINAL: No pain. No nausea or vomiting; No diarrhea   NEUROLOGICAL: left sided weakness  HEME/LYMPH: No  bleeding gums    Vital Signs Last 24 Hrs  T(C): 37, Max: 37.2 ( @ 23:45)  T(F): 98.6, Max: 98.9 ( @ 23:45)  HR: 68 (67 - 77)  BP: 137/67 (137/67 - 159/69)  BP(mean): --  RR: 16 (16 - 18)  SpO2: 96% (96% - 100%)    ________________________________________________  PHYSICAL EXAM:  GENERAL: NAD,   HEAD:  , Normocephalic  EYES:  conjunctiva and sclera clear, legally blind   NECK: Supple, No JVD    NERVOUS SYSTEM:  Alert & Oriented X3, right sided full strength, left upper and lower extremity weakness 3/5   CHEST/LUNG: Clear to auscultation bilaterally;   HEART: S1 S2+;   ABDOMEN: Soft, Nontender, Nondistended; Bowel sounds present  EXTREMITIES: no cyanosis; no edema; no calf tenderness    _________________________________________________  LABS:                        11.2   6.0   )-----------( 269      ( 2017 08:35 )             32.8     06-    139  |  106  |  19<H>  ----------------------------<  152<H>  3.9   |  27  |  0.83    Ca    8.9      2017 08:35  Phos  3.7     06-  Mg     2.0     06-26    TPro  6.8  /  Alb  3.3<L>  /  TBili  0.3  /  DBili  x   /  AST  16  /  ALT  18  /  AlkPhos  70      PT/INR - ( 2017 09:08 )   PT: 11.3 sec;   INR: 1.04 ratio         PTT - ( 2017 09:08 )  PTT:31.8 sec  Urinalysis Basic - ( 2017 13:05 )    Color: Yellow / Appearance: Clear / S.010 / pH: x  Gluc: x / Ketone: Negative  / Bili: Negative / Urobili: Negative   Blood: x / Protein: 100 / Nitrite: Negative   Leuk Esterase: Negative / RBC: 0-2 /HPF / WBC 0-2 /HPF   Sq Epi: x / Non Sq Epi: Few / Bacteria: Trace /HPF      CAPILLARY BLOOD GLUCOSE  168 (2017 11:15)  166 (2017 07:39)  166 (2017 21:56)  129 (2017 18:02)    MRI:   Acute infarct in the right janel.

## 2017-06-26 NOTE — DISCHARGE NOTE ADULT - PLAN OF CARE
prevent the further stroke You came with weakness, later found to have acute right ARELI stroke , monitored on telemetry , no arrythmia noted, given the aspirin 325 mg , Plavix, Lipitor 40 mg. Physical therapy recommended for acute rehab, patient need to control the risk factors like HTN and diabetes , outpatient f/up with PCP recommended. You can resume the 81 mg of aspirin daily. Keep the BP around the 130/80 You home med amlodipine and enalapril given, later since your BP high , enalapril increased to 20 mg daily. Take the DASH/TLC diet, since you have multiple strokes your BP need to be controlled. keep the fingersticks Your oral home medications kept on hold, given the same dose of lantus at home , blood sugar well controlled. HBA1c is 8, you can take the home meds , outpatient f/up with PCP recommended. Continue with suppotive care Your home med continued. You came with weakness, later found to have acute right ARELI stroke , monitored on telemetry , no arrythmia noted, given the aspirin 325 mg , Plavix, Lipitor 40 mg. Physical therapy recommended for acute rehab, patient need to control the risk factors like HTN and diabetes , outpatient f/up with PCP recommended. You home med amlodipine and enalapril given. Take the DASH/TLC diet, since you have multiple strokes your BP need to be controlled. You came with weakness, later found to have acute right ARELI stroke , monitored on telemetry , no arrythmia noted, given the aspirin 325 mg , Plavix, Lipitor 40 mg. Physical therapy recommended for acute rehab, patient need to control the risk factors like HTN and diabetes , outpatient f/up with PCP recommended. Needs MRA outpatient, and neuro f/up in 2 weeks. Continue with supportive care

## 2017-06-26 NOTE — DISCHARGE NOTE ADULT - CARE PROVIDERS DIRECT ADDRESSES
,DirectAddress_Unknown ,DirectAddress_Unknown,[email or web address]

## 2017-06-26 NOTE — DISCHARGE NOTE ADULT - VISION (WITH CORRECTIVE LENSES IF THE PATIENT USUALLY WEARS THEM):
Partially impaired: cannot see medication labels or newsprint, but can see obstacles in path, and the surrounding layout; can count fingers at arm's length/legally blind

## 2017-06-27 LAB
ANION GAP SERPL CALC-SCNC: 7 MMOL/L — SIGNIFICANT CHANGE UP (ref 5–17)
BUN SERPL-MCNC: 24 MG/DL — HIGH (ref 7–18)
CALCIUM SERPL-MCNC: 8.8 MG/DL — SIGNIFICANT CHANGE UP (ref 8.4–10.5)
CHLORIDE SERPL-SCNC: 107 MMOL/L — SIGNIFICANT CHANGE UP (ref 96–108)
CO2 SERPL-SCNC: 28 MMOL/L — SIGNIFICANT CHANGE UP (ref 22–31)
CREAT SERPL-MCNC: 1.07 MG/DL — SIGNIFICANT CHANGE UP (ref 0.5–1.3)
GLUCOSE SERPL-MCNC: 72 MG/DL — SIGNIFICANT CHANGE UP (ref 70–99)
HCT VFR BLD CALC: 35 % — LOW (ref 39–50)
HGB BLD-MCNC: 12 G/DL — LOW (ref 13–17)
MAGNESIUM SERPL-MCNC: 2.3 MG/DL — SIGNIFICANT CHANGE UP (ref 1.6–2.6)
MCHC RBC-ENTMCNC: 31.6 PG — SIGNIFICANT CHANGE UP (ref 27–34)
MCHC RBC-ENTMCNC: 34.2 GM/DL — SIGNIFICANT CHANGE UP (ref 32–36)
MCV RBC AUTO: 92.5 FL — SIGNIFICANT CHANGE UP (ref 80–100)
PHOSPHATE SERPL-MCNC: 3.8 MG/DL — SIGNIFICANT CHANGE UP (ref 2.5–4.5)
PLATELET # BLD AUTO: 271 K/UL — SIGNIFICANT CHANGE UP (ref 150–400)
POTASSIUM SERPL-MCNC: 3.9 MMOL/L — SIGNIFICANT CHANGE UP (ref 3.5–5.3)
POTASSIUM SERPL-SCNC: 3.9 MMOL/L — SIGNIFICANT CHANGE UP (ref 3.5–5.3)
RBC # BLD: 3.78 M/UL — LOW (ref 4.2–5.8)
RBC # FLD: 12.8 % — SIGNIFICANT CHANGE UP (ref 10.3–14.5)
SODIUM SERPL-SCNC: 142 MMOL/L — SIGNIFICANT CHANGE UP (ref 135–145)
WBC # BLD: 6.9 K/UL — SIGNIFICANT CHANGE UP (ref 3.8–10.5)
WBC # FLD AUTO: 6.9 K/UL — SIGNIFICANT CHANGE UP (ref 3.8–10.5)

## 2017-06-27 RX ADMIN — TAMSULOSIN HYDROCHLORIDE 0.4 MILLIGRAM(S): 0.4 CAPSULE ORAL at 22:38

## 2017-06-27 RX ADMIN — Medication 10 MILLIGRAM(S): at 06:21

## 2017-06-27 RX ADMIN — Medication 100 MILLIGRAM(S): at 14:26

## 2017-06-27 RX ADMIN — ENOXAPARIN SODIUM 40 MILLIGRAM(S): 100 INJECTION SUBCUTANEOUS at 11:54

## 2017-06-27 RX ADMIN — DONEPEZIL HYDROCHLORIDE 10 MILLIGRAM(S): 10 TABLET, FILM COATED ORAL at 22:38

## 2017-06-27 RX ADMIN — INSULIN GLARGINE 24 UNIT(S): 100 INJECTION, SOLUTION SUBCUTANEOUS at 22:38

## 2017-06-27 RX ADMIN — AMLODIPINE BESYLATE 10 MILLIGRAM(S): 2.5 TABLET ORAL at 06:21

## 2017-06-27 RX ADMIN — SENNA PLUS 2 TABLET(S): 8.6 TABLET ORAL at 22:38

## 2017-06-27 RX ADMIN — GABAPENTIN 300 MILLIGRAM(S): 400 CAPSULE ORAL at 17:44

## 2017-06-27 RX ADMIN — Medication 10 MILLIGRAM(S): at 11:54

## 2017-06-27 RX ADMIN — Medication 325 MILLIGRAM(S): at 11:53

## 2017-06-27 RX ADMIN — POLYETHYLENE GLYCOL 3350 17 GRAM(S): 17 POWDER, FOR SOLUTION ORAL at 11:54

## 2017-06-27 RX ADMIN — ATORVASTATIN CALCIUM 40 MILLIGRAM(S): 80 TABLET, FILM COATED ORAL at 22:38

## 2017-06-27 RX ADMIN — GABAPENTIN 300 MILLIGRAM(S): 400 CAPSULE ORAL at 06:21

## 2017-06-27 RX ADMIN — PANTOPRAZOLE SODIUM 40 MILLIGRAM(S): 20 TABLET, DELAYED RELEASE ORAL at 06:21

## 2017-06-27 RX ADMIN — CLOPIDOGREL BISULFATE 75 MILLIGRAM(S): 75 TABLET, FILM COATED ORAL at 11:54

## 2017-06-27 NOTE — PROGRESS NOTE ADULT - ATTENDING COMMENTS
Patient was seen and examined by myself with team. Case was discussed with house staff in details.  72 y/o M hospitalized due to    1. acute CVA  2. Diabetes  3. essential HTN  Care as outlined above.  Plan discussed with patient and family in details at bedside and all their questions / concerns were addressed Patient was seen and examined by myself with team. Case was discussed with house staff in details.  70 y/o M hospitalized due to    1. acute CVA- continue with antithrombotic and statin; BP control; neuro follow up; physical therapy as tolerated  2. Diabetes- monitor f/s; continue with insulin  3. essential HTN- monitor BP  Care as outlined above.  Plan discussed with patient and family in details at bedside and all their questions / concerns were addressed

## 2017-06-27 NOTE — PROGRESS NOTE ADULT - PROBLEM SELECTOR PLAN 2
- C/w Lantus 24 Units two times/ day ( home dose)   -  HBA1c 8.0  - Blood sugar well controlled   - Holded home meds  - C/w gabapentin at home dose

## 2017-06-27 NOTE — PROGRESS NOTE ADULT - SUBJECTIVE AND OBJECTIVE BOX
Patient is a 71y old  Male who presents with a chief complaint of Worsening leg weakness (26 Jun 2017 07:17)      INTERVAL HPI/OVERNIGHT EVENTS: no new complaints      REVIEW OF SYSTEMS:    CONSTITUTIONAL: No fever,   EYES: legally blind  NECK: No pain or stiffness  RESPIRATORY: No cough; No shortness of breath  CARDIOVASCULAR: No chest pain, no palpitations  GASTROINTESTINAL: No pain. No nausea or vomiting; No diarrhea   NEUROLOGICAL: No headache or numbness, no tremors      Vital Signs Last 24 Hrs  T(C): 36.8 (27 Jun 2017 12:15), Max: 36.9 (27 Jun 2017 00:22)  T(F): 98.2 (27 Jun 2017 12:15), Max: 98.5 (27 Jun 2017 00:22)  HR: 71 (27 Jun 2017 12:15) (64 - 72)  BP: 136/63 (27 Jun 2017 12:15) (136/63 - 157/70)  BP(mean): --  RR: 17 (27 Jun 2017 12:15) (15 - 18)  SpO2: 98% (27 Jun 2017 12:15) (96% - 98%)    ________________________________________________  PHYSICAL EXAM:  GENERAL: NAD,   HEAD:  , Normocephalic  EYES:  conjunctiva and sclera clear, decreased visual acuity   NECK: Supple, No JVD    NERVOUS SYSTEM:  Alert & Oriented X3,   CHEST/LUNG: Clear to auscultation bilaterally;   HEART: S1 S2+;   ABDOMEN: Soft, Nontender, Nondistended; Bowel sounds present  EXTREMITIES: no cyanosis; no edema; no calf tenderness  LUE , LLE power 4/5    _________________________________________________  LABS:                        12.0   6.9   )-----------( 271      ( 27 Jun 2017 07:02 )             35.0     06-27    142  |  107  |  24<H>  ----------------------------<  72  3.9   |  28  |  1.07    Ca    8.8      27 Jun 2017 07:02  Phos  3.8     06-27  Mg     2.3     06-27    TPro  6.8  /  Alb  3.3<L>  /  TBili  0.3  /  DBili  x   /  AST  16  /  ALT  18  /  AlkPhos  70  06-26        CAPILLARY BLOOD GLUCOSE  111 (27 Jun 2017 12:15)  79 (27 Jun 2017 09:35)  152 (26 Jun 2017 21:26)  140 (26 Jun 2017 16:47) Patient is a 71y old  Male who presents with a chief complaint of Worsening leg weakness (26 Jun 2017 07:17)      INTERVAL HPI/ OVERNIGHT EVENTS: no new complaints; family at bedside      REVIEW OF SYSTEMS:    CONSTITUTIONAL: No fever,   EYES: legally blind  NECK: No pain or stiffness  RESPIRATORY: No cough; No shortness of breath  CARDIOVASCULAR: No chest pain, no palpitations  GASTROINTESTINAL: No pain. No nausea or vomiting; No diarrhea   NEUROLOGICAL: No headache or numbness, no tremors      Vital Signs Last 24 Hrs  T(C): 36.8 (27 Jun 2017 12:15), Max: 36.9 (27 Jun 2017 00:22)  T(F): 98.2 (27 Jun 2017 12:15), Max: 98.5 (27 Jun 2017 00:22)  HR: 71 (27 Jun 2017 12:15) (64 - 72)  BP: 136/63 (27 Jun 2017 12:15) (136/63 - 157/70)  BP(mean): --  RR: 17 (27 Jun 2017 12:15) (15 - 18)  SpO2: 98% (27 Jun 2017 12:15) (96% - 98%)    ________________________________________________  PHYSICAL EXAM:  GENERAL: NAD,   HEAD:  , Normocephalic  EYES:  conjunctiva and sclera clear, decreased visual acuity   NECK: Supple, No JVD    CHEST/LUNG: Clear to auscultation bilaterally;   HEART: S1 S2+;   ABDOMEN: Soft, Nontender, Nondistended; Bowel sounds present  EXTREMITIES: no cyanosis; no edema; no calf tenderness  NERVOUS SYSTEM:  Alert & Oriented to place and person; ROSY MAGALLANES power 4/5    _________________________________________________  LABS:                        12.0   6.9   )-----------( 271      ( 27 Jun 2017 07:02 )             35.0     06-27    142  |  107  |  24<H>  ----------------------------<  72  3.9   |  28  |  1.07    Ca    8.8      27 Jun 2017 07:02  Phos  3.8     06-27  Mg     2.3     06-27    TPro  6.8  /  Alb  3.3<L>  /  TBili  0.3  /  DBili  x   /  AST  16  /  ALT  18  /  AlkPhos  70  06-26        CAPILLARY BLOOD GLUCOSE  111 (27 Jun 2017 12:15)  79 (27 Jun 2017 09:35)  152 (26 Jun 2017 21:26)  140 (26 Jun 2017 16:47)

## 2017-06-27 NOTE — PHYSICAL THERAPY INITIAL EVALUATION ADULT - PASSIVE RANGE OF MOTION EXAMINATION, REHAB EVAL
Left LE Passive ROM was WFL (within functional limits)/Left UE Passive ROM was WFL (within functional limits)

## 2017-06-27 NOTE — PHYSICAL THERAPY INITIAL EVALUATION ADULT - IMPAIRMENTS CONTRIBUTING IMPAIRED BED MOBILITY, REHAB EVAL
cva/cognition/impaired postural control/impaired balance/impaired coordination/impaired motor control/abnormal muscle tone/decreased strength

## 2017-06-27 NOTE — PHYSICAL THERAPY INITIAL EVALUATION ADULT - IMPAIRMENTS FOUND, PT EVAL
gross motor/muscle strength/sensory integrity/decreased midline orientation/cognitive impairment/gait, locomotion, and balance/neuromotor development and sensory integration/fine motor/visual motor

## 2017-06-27 NOTE — PROGRESS NOTE ADULT - ASSESSMENT
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 Admitted for stroke

## 2017-06-27 NOTE — PROGRESS NOTE ADULT - PROBLEM SELECTOR PLAN 1
- Acute stroke in right janel   - Continue aspirin 325 mg, plavix 75 mg, atorvastatin 40mg   - Carotid doppler negative for stenosis , echo shows normal EF , mild concentric LVH, mild left atrial enragement   - PT consult recommended for Acute rehab  - Neurology: Dr He - Acute stroke in right janel   - Continue Aspirin 325 mg, Plavix 75 mg, atorvastatin 40mg   - Carotid doppler negative for stenosis , echo shows normal EF , mild concentric LVH, mild left atrial enragement   - PT consult recommended for Acute rehab  - Neurology: Dr He

## 2017-06-28 PROCEDURE — 99232 SBSQ HOSP IP/OBS MODERATE 35: CPT | Mod: GC

## 2017-06-28 PROCEDURE — 99232 SBSQ HOSP IP/OBS MODERATE 35: CPT

## 2017-06-28 RX ORDER — ATORVASTATIN CALCIUM 80 MG/1
1 TABLET, FILM COATED ORAL
Qty: 30 | Refills: 0
Start: 2017-06-28 | End: 2017-07-28

## 2017-06-28 RX ORDER — ASPIRIN/CALCIUM CARB/MAGNESIUM 324 MG
1 TABLET ORAL
Qty: 30 | Refills: 0
Start: 2017-06-28 | End: 2017-07-28

## 2017-06-28 RX ORDER — ATORVASTATIN CALCIUM 80 MG/1
1 TABLET, FILM COATED ORAL
Qty: 0 | Refills: 0 | COMMUNITY

## 2017-06-28 RX ADMIN — GABAPENTIN 300 MILLIGRAM(S): 400 CAPSULE ORAL at 18:01

## 2017-06-28 RX ADMIN — INSULIN GLARGINE 24 UNIT(S): 100 INJECTION, SOLUTION SUBCUTANEOUS at 12:58

## 2017-06-28 RX ADMIN — CLOPIDOGREL BISULFATE 75 MILLIGRAM(S): 75 TABLET, FILM COATED ORAL at 12:50

## 2017-06-28 RX ADMIN — DONEPEZIL HYDROCHLORIDE 10 MILLIGRAM(S): 10 TABLET, FILM COATED ORAL at 21:47

## 2017-06-28 RX ADMIN — AMLODIPINE BESYLATE 10 MILLIGRAM(S): 2.5 TABLET ORAL at 05:54

## 2017-06-28 RX ADMIN — ATORVASTATIN CALCIUM 40 MILLIGRAM(S): 80 TABLET, FILM COATED ORAL at 21:47

## 2017-06-28 RX ADMIN — TAMSULOSIN HYDROCHLORIDE 0.4 MILLIGRAM(S): 0.4 CAPSULE ORAL at 21:47

## 2017-06-28 RX ADMIN — Medication 10 MILLIGRAM(S): at 12:50

## 2017-06-28 RX ADMIN — Medication 10 MILLIGRAM(S): at 21:47

## 2017-06-28 RX ADMIN — INSULIN GLARGINE 24 UNIT(S): 100 INJECTION, SOLUTION SUBCUTANEOUS at 21:47

## 2017-06-28 RX ADMIN — Medication 100 MILLIGRAM(S): at 12:50

## 2017-06-28 RX ADMIN — Medication 325 MILLIGRAM(S): at 12:50

## 2017-06-28 RX ADMIN — GABAPENTIN 300 MILLIGRAM(S): 400 CAPSULE ORAL at 05:53

## 2017-06-28 RX ADMIN — ENOXAPARIN SODIUM 40 MILLIGRAM(S): 100 INJECTION SUBCUTANEOUS at 12:51

## 2017-06-28 RX ADMIN — PANTOPRAZOLE SODIUM 40 MILLIGRAM(S): 20 TABLET, DELAYED RELEASE ORAL at 05:53

## 2017-06-28 RX ADMIN — SENNA PLUS 2 TABLET(S): 8.6 TABLET ORAL at 21:47

## 2017-06-28 RX ADMIN — POLYETHYLENE GLYCOL 3350 17 GRAM(S): 17 POWDER, FOR SOLUTION ORAL at 12:50

## 2017-06-28 RX ADMIN — Medication 10 MILLIGRAM(S): at 05:54

## 2017-06-28 NOTE — PROGRESS NOTE ADULT - SUBJECTIVE AND OBJECTIVE BOX
Patient is a 71y old  Male who presents with a chief complaint of Worsening leg weakness (26 Jun 2017 07:17)      INTERVAL HPI/OVERNIGHT EVENTS: no new complaints    REVIEW OF SYSTEMS:    CONSTITUTIONAL: No fever,   EYES: legally blind   NECK: No pain or stiffness  RESPIRATORY: No cough; No shortness of breath  CARDIOVASCULAR: No chest pain, no palpitations  GASTROINTESTINAL: No pain. No nausea or vomiting; No diarrhea   NEUROLOGICAL: No headache or numbness, no tremors      Vital Signs Last 24 Hrs  T(C): 36.7 (28 Jun 2017 07:29), Max: 37.1 (27 Jun 2017 16:25)  T(F): 98 (28 Jun 2017 07:29), Max: 98.7 (27 Jun 2017 16:25)  HR: 68 (28 Jun 2017 07:29) (63 - 71)  BP: 158/69 (28 Jun 2017 07:29) (124/59 - 158/69)  BP(mean): --  RR: 18 (28 Jun 2017 07:29) (17 - 18)  SpO2: 97% (28 Jun 2017 07:29) (96% - 98%)    ________________________________________________  PHYSICAL EXAM:  GENERAL: NAD,   HEAD:  , Normocephalic  EYES:  conjunctiva and sclera clear, legally blind   NECK: Supple, No JVD    NERVOUS SYSTEM:  Alert & Oriented X3,   CHEST/LUNG: Clear to auscultation bilaterally;   HEART: S1 S2+;   ABDOMEN: Soft, Nontender, Nondistended; Bowel sounds present  EXTREMITIES: no cyanosis; no edema; no calf tenderness  LUE / LLE: 3/5    _________________________________________________  LABS:             No new labs today         CAPILLARY BLOOD GLUCOSE  102 (28 Jun 2017 07:29)  117 (27 Jun 2017 21:03)  129 (27 Jun 2017 16:25)  111 (27 Jun 2017 12:15)

## 2017-06-28 NOTE — PROGRESS NOTE ADULT - ATTENDING COMMENTS
Patient is in hospital because of weakness, which is attributable to multiple strokes.   He is also legally blind.   He is alert and oriented, needs assistance to eat, but has a good appetite.   He is awaiting evaluation for [facility] for mobilization.

## 2017-06-28 NOTE — PROGRESS NOTE ADULT - PROBLEM SELECTOR PLAN 1
- Continue Aspirin 325 mg, Plavix 75 mg, atorvastatin 40mg  - Awaiting discharge plan to acute rehab   - Neurology: Dr He/ Dr Tinajero

## 2017-06-28 NOTE — PROGRESS NOTE ADULT - ASSESSMENT
Acute right pontine stroke, lacunar, likely due to HTN/ DM  outpatient MRA head  cw asa and lipitor  BP goal of normal  PT  DVT ppx    outpatient neuro fu in 2 weeks

## 2017-06-28 NOTE — PROGRESS NOTE ADULT - SUBJECTIVE AND OBJECTIVE BOX
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   Patient had developed bilateral leg weakness for which he was evaluated at [facility] and discharged to rehab. From there he got a rolling walker to use  at home. Yesterday he started to feel numbness in his legs and today was not able to get up at all. He also has worsening of numbness and tingling of his fingers. Earlier in June his medications were adjusted including increased dose of metformin  for diabetes and atorvastatin in place of simvastatin for HLD.   He has a long standing history of GERD due to which he often misses his medications including last night. Otherwise denies any cough, chest pain, nausea, vomiting, abdominal pain and urinary complaints. No fall, loss of consciousness, slurred speech or seizure like activity.    Former smoker. 1PPD for 30 years before he quit 15 years ago. (25 Jun 2017 13:21)      Interval History -        Subjective:    Review of Systems:  Constitutional:        Eyes, Ears, Mouth, Throat:   Respiratory:                            Cardiovascular:   Gastrointestinal:                                     Genitourinary:   Musculoskeletal:                                    Dermatologic:   Neurological: as per above                                                                 Psychiatric:   Endocrine:              Hematologic/Lymphatic:     MEDICATIONS  (STANDING):  atorvastatin 40 milliGRAM(s) Oral at bedtime  enoxaparin Injectable 40 milliGRAM(s) SubCutaneous daily  insulin lispro (HumaLOG) corrective regimen sliding scale   SubCutaneous Before meals and at bedtime  dextrose 5%. 1000 milliLiter(s) (50 mL/Hr) IV Continuous <Continuous>  dextrose 50% Injectable 12.5 Gram(s) IV Push once  dextrose 50% Injectable 25 Gram(s) IV Push once  dextrose 50% Injectable 25 Gram(s) IV Push once  pantoprazole    Tablet 40 milliGRAM(s) Oral before breakfast  tamsulosin 0.4 milliGRAM(s) Oral at bedtime  gabapentin 300 milliGRAM(s) Oral every 12 hours  FLUoxetine 10 milliGRAM(s) Oral daily  clopidogrel Tablet 75 milliGRAM(s) Oral daily  amLODIPine   Tablet 10 milliGRAM(s) Oral daily  donepezil 10 milliGRAM(s) Oral at bedtime  aspirin 325 milliGRAM(s) Oral daily  insulin glargine Injectable (LANTUS) 24 Unit(s) SubCutaneous two times a day  docusate sodium 100 milliGRAM(s) Oral daily  senna 2 Tablet(s) Oral at bedtime  polyethylene glycol 3350 17 Gram(s) Oral daily  enalapril 10 milliGRAM(s) Oral daily    MEDICATIONS  (PRN):  dextrose Gel 1 Dose(s) Oral once PRN Blood Glucose LESS THAN 70 milliGRAM(s)/deciLiter  glucagon  Injectable 1 milliGRAM(s) IntraMuscular once PRN Glucose <70 milliGRAM(s)/deciLiter      Allergies    No Known Allergies    Intolerances        Objective:   Vital Signs Last 24 Hrs  T(C): 36.8 (28 Jun 2017 11:29), Max: 37.1 (27 Jun 2017 16:25)  T(F): 98.3 (28 Jun 2017 11:29), Max: 98.7 (27 Jun 2017 16:25)  HR: 69 (28 Jun 2017 11:29) (63 - 69)  BP: 128/56 (28 Jun 2017 11:29) (124/59 - 158/69)  BP(mean): --  RR: 16 (28 Jun 2017 11:29) (16 - 18)  SpO2: 98% (28 Jun 2017 11:29) (96% - 98%)    General Exam:   General appearance: No acute distress                 Cardiovascular: Pedal dorsalis pulses intact bilaterally    Neurological Exam:  Mental Status: Orientated to self, date and place.  Attention intact.  No dysarthria, aphasia or neglect.  Knowledge intact.  Registration intact.  Short and long term memory grossly intact.      Cranial Nerves: CN I - not tested.  PERRL, EOMI, VFF, no nystagmus or diplopia.  No APD.  Fundi not visualized bilaterally.  CN V1-3 intact to light touch and pinprick.  No facial asymmetry.  Hearing intact to finger rub bilaterally.  Tongue, uvula and palate midline.  Sternocleidomastoid and Trapezius intact bilaterally.    Motor:   Tone: normal.                  Strength: intact throughout  Pronator drift: none                 Dysmeria: None to finger-nose-finger or heel-shin-heel  No truncal ataxia.    Tremor: No resting, postural or action tremor.  No myoclonus.    Sensation: intact to light touch, pinprick, vibration and proprioception    Deep Tendon Reflexes: 1+ bilateral biceps, triceps, brachioradialis, knee and ankle  Toes flexor bilaterally    Gait: normal and stable.      Other:    06-27    142  |  107  |  24<H>  ----------------------------<  72  3.9   |  28  |  1.07    Ca    8.8      27 Jun 2017 07:02  Phos  3.8     06-27  Mg     2.3     06-27 06-27    142  |  107  |  24<H>  ----------------------------<  72  3.9   |  28  |  1.07    Ca    8.8      27 Jun 2017 07:02  Phos  3.8     06-27  Mg     2.3     06-27          Radiology    tele: nsr  TTE:   < from: Transthoracic Echocardiogram (06.26.17 @ 13:01) >  CONCLUSIONS:  1. Normal mitral valve.  2. Calcified trileaflet aortic valve with decreased  opening.  3. Normal aortic root.  4. Mild left atrial enlargement.  5. Moderate concentric left ventricular hypertrophy.  6. Normal Left Ventricular Systolic Function,  (EF = 55 to  60%)  7. Grade I diastolic dysfunction  8. Normal right atrium.  9. Normal right ventricular size and function.  10. Unable to estimate RVSP.  11. Normal tricuspid valve.  12. Normal pulmonic valve.  13. Normal pericardium with no pericardial effusion.    < end of copied text >    CD: unremarkable    MRI brain (images reviwed): < from: MRI Head w/o Cont (06.26.17 @ 13:25) >  Acute infarct in the right janel.    < end of copied text > Neurology Follow up note    Name  JOHNNIE TAMAYO    Subjective:  no change of weakness  mild dysarthria  chronic visual changes    Review of Systems:  Respiratory:      no cough                      Cardiovascular: no cp    MEDICATIONS  (STANDING):  atorvastatin 40 milliGRAM(s) Oral at bedtime  enoxaparin Injectable 40 milliGRAM(s) SubCutaneous daily  insulin lispro (HumaLOG) corrective regimen sliding scale   SubCutaneous Before meals and at bedtime  dextrose 5%. 1000 milliLiter(s) (50 mL/Hr) IV Continuous <Continuous>  dextrose 50% Injectable 12.5 Gram(s) IV Push once  dextrose 50% Injectable 25 Gram(s) IV Push once  dextrose 50% Injectable 25 Gram(s) IV Push once  pantoprazole    Tablet 40 milliGRAM(s) Oral before breakfast  tamsulosin 0.4 milliGRAM(s) Oral at bedtime  gabapentin 300 milliGRAM(s) Oral every 12 hours  FLUoxetine 10 milliGRAM(s) Oral daily  clopidogrel Tablet 75 milliGRAM(s) Oral daily  amLODIPine   Tablet 10 milliGRAM(s) Oral daily  donepezil 10 milliGRAM(s) Oral at bedtime  aspirin 325 milliGRAM(s) Oral daily  insulin glargine Injectable (LANTUS) 24 Unit(s) SubCutaneous two times a day  docusate sodium 100 milliGRAM(s) Oral daily  senna 2 Tablet(s) Oral at bedtime  polyethylene glycol 3350 17 Gram(s) Oral daily  enalapril 10 milliGRAM(s) Oral daily    MEDICATIONS  (PRN):  dextrose Gel 1 Dose(s) Oral once PRN Blood Glucose LESS THAN 70 milliGRAM(s)/deciLiter  glucagon  Injectable 1 milliGRAM(s) IntraMuscular once PRN Glucose <70 milliGRAM(s)/deciLiter      Allergies    No Known Allergies    Intolerances        Objective:   Vital Signs Last 24 Hrs  T(C): 36.8 (28 Jun 2017 11:29), Max: 37.1 (27 Jun 2017 16:25)  T(F): 98.3 (28 Jun 2017 11:29), Max: 98.7 (27 Jun 2017 16:25)  HR: 69 (28 Jun 2017 11:29) (63 - 69)  BP: 128/56 (28 Jun 2017 11:29) (124/59 - 158/69)  BP(mean): --  RR: 16 (28 Jun 2017 11:29) (16 - 18)  SpO2: 98% (28 Jun 2017 11:29) (96% - 98%)    General Exam:   General appearance: No acute distress                 Cardiovascular: Pedal dorsalis pulses intact bilaterally    Neurological Exam:  Mental Status: Orientated to self, date and place.  Attention intact.  + dysarthria (1).  No aphasia or neglect.     Cranial Nerves: CN I - not tested.  PERRL, EOMI, VFF, no nystagmus or diplopia.  No APD.  Fundi not visualized bilaterally.  CN V1-3 intact to light touch. + left NLF (1).  Hearing intact to finger rub bilaterally.  Tongue, uvula and palate midline.  Sternocleidomastoid and Trapezius intact bilaterally.    Motor:   Tone: normal.                  Strength: left arm and leg weakness (2, 2)  Pronator drift: none                 Dysmeria: None to finger-nose-finger or heel-shin-heels.    Sensation: intact to light touch      Other: NIHSS 6    06-27    142  |  107  |  24<H>  ----------------------------<  72  3.9   |  28  |  1.07    Ca    8.8      27 Jun 2017 07:02  Phos  3.8     06-27  Mg     2.3     06-27 06-27    142  |  107  |  24<H>  ----------------------------<  72  3.9   |  28  |  1.07    Ca    8.8      27 Jun 2017 07:02  Phos  3.8     06-27  Mg     2.3     06-27          Radiology    tele: nsr  TTE:   < from: Transthoracic Echocardiogram (06.26.17 @ 13:01) >  CONCLUSIONS:  1. Normal mitral valve.  2. Calcified trileaflet aortic valve with decreased  opening.  3. Normal aortic root.  4. Mild left atrial enlargement.  5. Moderate concentric left ventricular hypertrophy.  6. Normal Left Ventricular Systolic Function,  (EF = 55 to  60%)  7. Grade I diastolic dysfunction  8. Normal right atrium.  9. Normal right ventricular size and function.  10. Unable to estimate RVSP.  11. Normal tricuspid valve.  12. Normal pulmonic valve.  13. Normal pericardium with no pericardial effusion.    < end of copied text >    CD: unremarkable    MRI brain (images reviwed): < from: MRI Head w/o Cont (06.26.17 @ 13:25) >  Acute infarct in the right jnael.    < end of copied text >

## 2017-06-29 PROCEDURE — 99232 SBSQ HOSP IP/OBS MODERATE 35: CPT | Mod: GC

## 2017-06-29 RX ADMIN — PANTOPRAZOLE SODIUM 40 MILLIGRAM(S): 20 TABLET, DELAYED RELEASE ORAL at 06:30

## 2017-06-29 RX ADMIN — GABAPENTIN 300 MILLIGRAM(S): 400 CAPSULE ORAL at 18:07

## 2017-06-29 RX ADMIN — TAMSULOSIN HYDROCHLORIDE 0.4 MILLIGRAM(S): 0.4 CAPSULE ORAL at 21:33

## 2017-06-29 RX ADMIN — SENNA PLUS 2 TABLET(S): 8.6 TABLET ORAL at 21:33

## 2017-06-29 RX ADMIN — GABAPENTIN 300 MILLIGRAM(S): 400 CAPSULE ORAL at 06:30

## 2017-06-29 RX ADMIN — Medication 10 MILLIGRAM(S): at 06:30

## 2017-06-29 RX ADMIN — AMLODIPINE BESYLATE 10 MILLIGRAM(S): 2.5 TABLET ORAL at 06:30

## 2017-06-29 RX ADMIN — Medication 1: at 21:33

## 2017-06-29 RX ADMIN — CLOPIDOGREL BISULFATE 75 MILLIGRAM(S): 75 TABLET, FILM COATED ORAL at 13:20

## 2017-06-29 RX ADMIN — DONEPEZIL HYDROCHLORIDE 10 MILLIGRAM(S): 10 TABLET, FILM COATED ORAL at 21:33

## 2017-06-29 RX ADMIN — Medication 325 MILLIGRAM(S): at 13:20

## 2017-06-29 RX ADMIN — Medication 1: at 13:19

## 2017-06-29 RX ADMIN — Medication 10 MILLIGRAM(S): at 13:19

## 2017-06-29 RX ADMIN — ATORVASTATIN CALCIUM 40 MILLIGRAM(S): 80 TABLET, FILM COATED ORAL at 21:33

## 2017-06-29 RX ADMIN — ENOXAPARIN SODIUM 40 MILLIGRAM(S): 100 INJECTION SUBCUTANEOUS at 13:20

## 2017-06-29 RX ADMIN — INSULIN GLARGINE 24 UNIT(S): 100 INJECTION, SOLUTION SUBCUTANEOUS at 21:32

## 2017-06-29 RX ADMIN — Medication 1: at 07:53

## 2017-06-29 RX ADMIN — INSULIN GLARGINE 24 UNIT(S): 100 INJECTION, SOLUTION SUBCUTANEOUS at 09:13

## 2017-06-29 NOTE — CHART NOTE - NSCHARTNOTEFT_GEN_A_CORE
patient (admitted for stroke) was working with physical therapy and was seated on the chair when he slipped and had a fall. No head injury. pt was helped back into the bed immediately and was completely oriented and denied pain in his back, head, upper or lower extremities. Neurological exam unchanged from the morning. Attending notified. Malathi Perez (spouse notified by me as well at 4.56pm). patient (admitted for stroke) was seated on the chair when he slipped and had a fall. No head injury. pt was helped back into the bed immediately and was completely oriented and denied pain in his back, head, upper or lower extremities. Neurological exam unchanged from the morning. Attending notified. Malathi Perez (spouse notified by me as well at 4.56pm).

## 2017-06-29 NOTE — PROGRESS NOTE ADULT - SUBJECTIVE AND OBJECTIVE BOX
Patient is a 71y old  Male who presents with a chief complaint of Worsening leg weakness (26 Jun 2017 07:17)      INTERVAL HPI/OVERNIGHT EVENTS: no new complaints    REVIEW OF SYSTEMS:    CONSTITUTIONAL: No fever,   EYES: no acute visual disturbances  NECK: No pain or stiffness  RESPIRATORY: No cough; No shortness of breath  CARDIOVASCULAR: No chest pain, no palpitations  GASTROINTESTINAL: No pain. No nausea or vomiting; No diarrhea   NEUROLOGICAL: No headache or numbness, no tremors  HEME/LYMPH: No  bleeding gums    Vital Signs Last 24 Hrs  T(C): 37.1 (29 Jun 2017 09:45), Max: 37.1 (29 Jun 2017 09:45)  T(F): 98.8 (29 Jun 2017 09:45), Max: 98.8 (29 Jun 2017 09:45)  HR: 76 (29 Jun 2017 09:45) (67 - 76)  BP: 144/66 (29 Jun 2017 09:45) (126/47 - 171/72)  BP(mean): --  RR: 17 (29 Jun 2017 09:45) (16 - 17)  SpO2: 98% (29 Jun 2017 09:45) (97% - 99%)    ________________________________________________  PHYSICAL EXAM:  GENERAL: NAD,   HEAD:  , Normocephalic  EYES:  conjunctiva and sclera clear  NECK: Supple, No JVD    NERVOUS SYSTEM:  Alert & Oriented X3,   CHEST/LUNG: Clear to auscuitation bilaterally;   HEART: S1 S2+;   ABDOMEN: Soft, Nontender, Nondistended; Bowel sounds present    LUE and LLE power is 4/5    _________________________________________________  LABS:    No new labs today             CAPILLARY BLOOD GLUCOSE  177 (29 Jun 2017 07:44)  140 (28 Jun 2017 21:24)  136 (28 Jun 2017 16:07)

## 2017-06-29 NOTE — PROGRESS NOTE ADULT - PROBLEM SELECTOR PLAN 1
- Continue Aspirin 325 mg, Plavix 75 mg, atorvastatin 40mg  - Awaiting discharge plan to acute rehab   - Neurology Dr Tinajero  - recommended for outpatient MRA and neuro f/up in 2 weeks
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
## 2017-06-30 LAB
ANION GAP SERPL CALC-SCNC: 7 MMOL/L — SIGNIFICANT CHANGE UP (ref 5–17)
BUN SERPL-MCNC: 23 MG/DL — HIGH (ref 7–18)
CALCIUM SERPL-MCNC: 8.9 MG/DL — SIGNIFICANT CHANGE UP (ref 8.4–10.5)
CHLORIDE SERPL-SCNC: 107 MMOL/L — SIGNIFICANT CHANGE UP (ref 96–108)
CO2 SERPL-SCNC: 29 MMOL/L — SIGNIFICANT CHANGE UP (ref 22–31)
CREAT SERPL-MCNC: 1.03 MG/DL — SIGNIFICANT CHANGE UP (ref 0.5–1.3)
GLUCOSE SERPL-MCNC: 106 MG/DL — HIGH (ref 70–99)
HCT VFR BLD CALC: 33.8 % — LOW (ref 39–50)
HGB BLD-MCNC: 11.4 G/DL — LOW (ref 13–17)
MAGNESIUM SERPL-MCNC: 2.3 MG/DL — SIGNIFICANT CHANGE UP (ref 1.6–2.6)
MCHC RBC-ENTMCNC: 31.2 PG — SIGNIFICANT CHANGE UP (ref 27–34)
MCHC RBC-ENTMCNC: 33.7 GM/DL — SIGNIFICANT CHANGE UP (ref 32–36)
MCV RBC AUTO: 92.6 FL — SIGNIFICANT CHANGE UP (ref 80–100)
PHOSPHATE SERPL-MCNC: 3.3 MG/DL — SIGNIFICANT CHANGE UP (ref 2.5–4.5)
PLATELET # BLD AUTO: 246 K/UL — SIGNIFICANT CHANGE UP (ref 150–400)
POTASSIUM SERPL-MCNC: 4.2 MMOL/L — SIGNIFICANT CHANGE UP (ref 3.5–5.3)
POTASSIUM SERPL-SCNC: 4.2 MMOL/L — SIGNIFICANT CHANGE UP (ref 3.5–5.3)
RBC # BLD: 3.65 M/UL — LOW (ref 4.2–5.8)
RBC # FLD: 13 % — SIGNIFICANT CHANGE UP (ref 10.3–14.5)
SODIUM SERPL-SCNC: 143 MMOL/L — SIGNIFICANT CHANGE UP (ref 135–145)
WBC # BLD: 6.6 K/UL — SIGNIFICANT CHANGE UP (ref 3.8–10.5)
WBC # FLD AUTO: 6.6 K/UL — SIGNIFICANT CHANGE UP (ref 3.8–10.5)

## 2017-06-30 PROCEDURE — 72110 X-RAY EXAM L-2 SPINE 4/>VWS: CPT | Mod: 26

## 2017-06-30 PROCEDURE — 99232 SBSQ HOSP IP/OBS MODERATE 35: CPT | Mod: GC

## 2017-06-30 PROCEDURE — 70551 MRI BRAIN STEM W/O DYE: CPT | Mod: 26

## 2017-06-30 PROCEDURE — 73552 X-RAY EXAM OF FEMUR 2/>: CPT | Mod: 26,LT

## 2017-06-30 PROCEDURE — 73522 X-RAY EXAM HIPS BI 3-4 VIEWS: CPT | Mod: 26

## 2017-06-30 PROCEDURE — 99233 SBSQ HOSP IP/OBS HIGH 50: CPT

## 2017-06-30 PROCEDURE — 72148 MRI LUMBAR SPINE W/O DYE: CPT | Mod: 26

## 2017-06-30 RX ORDER — ACETAMINOPHEN 500 MG
650 TABLET ORAL ONCE
Qty: 0 | Refills: 0 | Status: COMPLETED | OUTPATIENT
Start: 2017-06-30 | End: 2017-06-30

## 2017-06-30 RX ORDER — DEXAMETHASONE 0.5 MG/5ML
4 ELIXIR ORAL EVERY 6 HOURS
Qty: 0 | Refills: 0 | Status: DISCONTINUED | OUTPATIENT
Start: 2017-06-30 | End: 2017-07-01

## 2017-06-30 RX ORDER — DEXAMETHASONE 0.5 MG/5ML
10 ELIXIR ORAL ONCE
Qty: 0 | Refills: 0 | Status: COMPLETED | OUTPATIENT
Start: 2017-06-30 | End: 2017-06-30

## 2017-06-30 RX ORDER — ACETAMINOPHEN 500 MG
650 TABLET ORAL EVERY 6 HOURS
Qty: 0 | Refills: 0 | Status: DISCONTINUED | OUTPATIENT
Start: 2017-06-30 | End: 2017-07-03

## 2017-06-30 RX ADMIN — DONEPEZIL HYDROCHLORIDE 10 MILLIGRAM(S): 10 TABLET, FILM COATED ORAL at 21:24

## 2017-06-30 RX ADMIN — ENOXAPARIN SODIUM 40 MILLIGRAM(S): 100 INJECTION SUBCUTANEOUS at 12:47

## 2017-06-30 RX ADMIN — Medication 4 MILLIGRAM(S): at 23:44

## 2017-06-30 RX ADMIN — TAMSULOSIN HYDROCHLORIDE 0.4 MILLIGRAM(S): 0.4 CAPSULE ORAL at 21:23

## 2017-06-30 RX ADMIN — ATORVASTATIN CALCIUM 40 MILLIGRAM(S): 80 TABLET, FILM COATED ORAL at 21:23

## 2017-06-30 RX ADMIN — GABAPENTIN 300 MILLIGRAM(S): 400 CAPSULE ORAL at 05:57

## 2017-06-30 RX ADMIN — INSULIN GLARGINE 24 UNIT(S): 100 INJECTION, SOLUTION SUBCUTANEOUS at 09:15

## 2017-06-30 RX ADMIN — SENNA PLUS 2 TABLET(S): 8.6 TABLET ORAL at 21:23

## 2017-06-30 RX ADMIN — Medication 650 MILLIGRAM(S): at 08:00

## 2017-06-30 RX ADMIN — Medication 10 MILLIGRAM(S): at 12:47

## 2017-06-30 RX ADMIN — Medication 10 MILLIGRAM(S): at 05:57

## 2017-06-30 RX ADMIN — GABAPENTIN 300 MILLIGRAM(S): 400 CAPSULE ORAL at 17:21

## 2017-06-30 RX ADMIN — Medication 100 MILLIGRAM(S): at 12:47

## 2017-06-30 RX ADMIN — Medication 650 MILLIGRAM(S): at 07:01

## 2017-06-30 RX ADMIN — CLOPIDOGREL BISULFATE 75 MILLIGRAM(S): 75 TABLET, FILM COATED ORAL at 12:47

## 2017-06-30 RX ADMIN — Medication 102 MILLIGRAM(S): at 21:24

## 2017-06-30 RX ADMIN — Medication 650 MILLIGRAM(S): at 23:47

## 2017-06-30 RX ADMIN — Medication 1: at 12:36

## 2017-06-30 RX ADMIN — Medication 650 MILLIGRAM(S): at 17:00

## 2017-06-30 RX ADMIN — Medication 325 MILLIGRAM(S): at 12:47

## 2017-06-30 RX ADMIN — Medication 650 MILLIGRAM(S): at 15:55

## 2017-06-30 RX ADMIN — Medication 1: at 17:21

## 2017-06-30 RX ADMIN — PANTOPRAZOLE SODIUM 40 MILLIGRAM(S): 20 TABLET, DELAYED RELEASE ORAL at 05:59

## 2017-06-30 RX ADMIN — INSULIN GLARGINE 24 UNIT(S): 100 INJECTION, SOLUTION SUBCUTANEOUS at 21:24

## 2017-06-30 NOTE — PROGRESS NOTE ADULT - ASSESSMENT
1. Fall    2. Acute right pontine stroke, lacunar, likely due to HTN/ DM  outpatient MRA head  cw asa and lipitor  BP goal of normal  PT  DVT ppx    outpatient neuro fu in 2 weeks 1. Fall with new back pain and right leg weakness with some concern for cord compression  MRI L spine STAT to r/o cord compression  decadron 10mg x1, then decardon 4mg [id number] until mri done  d/c dr. shaw    2. Acute right pontine stroke, lacunar, likely due to HTN/ DM  outpatient MRA head  cw asa and lipitor  BP goal of normal  PT  DVT ppx    Dr. Hall will fu until 7.5.17

## 2017-06-30 NOTE — PROGRESS NOTE ADULT - SUBJECTIVE AND OBJECTIVE BOX
71 M with PMH of HTN, DM, CVA, blind, dementia presented with worsening weakness in his legs and was diagnosed to have acute pontine right sided stroke. The pt had an extensive work up and is currently ready for discharge to acute rehab. yesterday pt had a fall while sitting in chair with PT. examined at bedside and no injuries noted. Wife, attending informed and incidence report filled out.    REVIEW OF SYSTEMS:  CONSTITUTIONAL: No fever, weight loss, or fatigue  EYES: decreased visual.  ENMT:  No difficulty hearing, tinnitus, vertigo; No sinus or throat pain  NECK: No pain or stiffness  RESPIRATORY: No cough, wheezing, chills or hemoptysis; No shortness of breath  CARDIOVASCULAR: No chest pain, palpitations, dizziness, or leg swelling  GASTROINTESTINAL: No abdominal or epigastric pain. No nausea, vomiting, or hematemesis; No diarrhea or constipation. No melena or hematochezia.  GENITOURINARY: No dysuria, frequency, hematuria, or incontinence  NEUROLOGICAL:  decreased strength on the left side.  SKIN: No itching, burning, rashes, or lesions   PSYCHIATRIC: No depression, anxiety, mood swings, or difficulty sleeping  HEME/LYMPH: No easy bruising, or bleeding gums  ALLERGY AND IMMUNOLOGIC: No hives or eczema    PHYSICAL EXAM:  GENERAL: NAD, well-groomed, well-developed  HEAD:  Atraumatic, Normocephalic  EYES: corneal opacities  ENMT: No tonsillar erythema, exudates, or enlargement; Moist mucous membranes, Good dentition, No lesions  NECK: Supple, No JVD, Normal thyroid  NERVOUS SYSTEM:  MS: AAOx3, power 4/5 bilateral Lower extremities. LUE 4/5  CHEST/LUNG: Clear to percussion bilaterally; No rales, rhonchi, wheezing, or rubs  HEART: Regular rate and rhythm; No murmurs, rubs, or gallops  ABDOMEN: Soft, Nontender, Nondistended; Bowel sounds present  EXTREMITIES:  2+ Peripheral Pulses, No clubbing, cyanosis, or edema  LYMPH: No lymphadenopathy noted  SKIN: No rashes or lesions    VITALS:  T(F): 98 (06-30-17 @ 05:16), Max: 98.8 (06-29-17 @ 09:45)  HR: 67 (06-30-17 @ 05:16)  BP: 163/83 (06-30-17 @ 05:16)  RR: 16 (06-30-17 @ 05:16)  SpO2: 97% (06-30-17 @ 05:16)    LABS:                        11.4   6.6   )-----------( 246      ( 30 Jun 2017 05:41 )             33.8     06-30    143  |  107  |  23<H>  ----------------------------<  106<H>  4.2   |  29  |  1.03    Ca    8.9      30 Jun 2017 05:41  Phos  3.3     06-30  Mg     2.3     06-30    CAPILLARY BLOOD GLUCOSE  115 (30 Jun 2017 08:38)  152 (29 Jun 2017 21:36)  119 (29 Jun 2017 16:33)  151 (29 Jun 2017 11:47)    Imaging Personally Reviewed:  [+ ] YES  [ ] NO    Consultant(s) Notes Reviewed:  [+ ] YES  [ ] NO    Care Discussed with Consultants/Other Providers [ +] YES  [ ] NO 71 M with PMH of HTN, DM, CVA, blind, dementia presented with worsening weakness in his legs and was diagnosed to have acute pontine right sided stroke. The pt had an extensive work up and is currently ready for discharge to acute rehab. yesterday pt had a fall while sitting in chair with PT. examined at bedside and no injuries noted. Wife, attending informed and incidence report filled out.    REVIEW OF SYSTEMS:  CONSTITUTIONAL: No fever, weight loss, or fatigue  EYES: decreased visual.  ENMT:  No difficulty hearing, tinnitus, vertigo; No sinus or throat pain  NECK: No pain or stiffness  RESPIRATORY: No cough, wheezing, chills or hemoptysis; No shortness of breath  CARDIOVASCULAR: No chest pain, palpitations, dizziness, or leg swelling  GASTROINTESTINAL: No abdominal or epigastric pain. No nausea, vomiting, or hematemesis; No diarrhea or constipation. No melena or hematochezia.  GENITOURINARY: No dysuria, frequency, hematuria, or incontinence  NEUROLOGICAL:  decreased strength on the left side. back pain and left leg pain  SKIN: No itching, burning, rashes, or lesions   PSYCHIATRIC: No depression, anxiety, mood swings, or difficulty sleeping  HEME/LYMPH: No easy bruising, or bleeding gums  ALLERGY AND IMMUNOLOGIC: No hives or eczema    PHYSICAL EXAM:  GENERAL: NAD, well-groomed, well-developed  HEAD:  Atraumatic, Normocephalic  EYES: corneal opacities  ENMT: No tonsillar erythema, exudates, or enlargement; Moist mucous membranes, Good dentition, No lesions  NECK: Supple, No JVD, Normal thyroid  NERVOUS SYSTEM:  MS: AAOx3, power 4/5 bilateral Lower extremities. LUE 4/5, tenderness in the back and left lower extremity.  CHEST/LUNG: Clear to percussion bilaterally; No rales, rhonchi, wheezing, or rubs  HEART: Regular rate and rhythm; No murmurs, rubs, or gallops  ABDOMEN: Soft, Nontender, Nondistended; Bowel sounds present  EXTREMITIES:  2+ Peripheral Pulses, No clubbing, cyanosis, or edema  LYMPH: No lymphadenopathy noted  SKIN: No rashes or lesions    VITALS:  T(F): 98 (06-30-17 @ 05:16), Max: 98.8 (06-29-17 @ 09:45)  HR: 67 (06-30-17 @ 05:16)  BP: 163/83 (06-30-17 @ 05:16)  RR: 16 (06-30-17 @ 05:16)  SpO2: 97% (06-30-17 @ 05:16)    LABS:                        11.4   6.6   )-----------( 246      ( 30 Jun 2017 05:41 )             33.8     06-30    143  |  107  |  23<H>  ----------------------------<  106<H>  4.2   |  29  |  1.03    Ca    8.9      30 Jun 2017 05:41  Phos  3.3     06-30  Mg     2.3     06-30    CAPILLARY BLOOD GLUCOSE  115 (30 Jun 2017 08:38)  152 (29 Jun 2017 21:36)  119 (29 Jun 2017 16:33)  151 (29 Jun 2017 11:47)    Imaging Personally Reviewed:  [+ ] YES  [ ] NO    Consultant(s) Notes Reviewed:  [+ ] YES  [ ] NO    Care Discussed with Consultants/Other Providers [ +] YES  [ ] NO

## 2017-06-30 NOTE — PROGRESS NOTE ADULT - ASSESSMENT
71 M with PMH of HTN, DM, CVA, blind, dementia presented with worsening weakness in his legs and was diagnosed to have acute pontine right sided stroke. The pt had an extensive work up and is currently ready for discharge to acute rehab. yesterday pt had a fall while sitting in chair with PT. examined at bedside and no injuries noted. Wife, attending informed and incidence report filled out.

## 2017-06-30 NOTE — PROGRESS NOTE ADULT - PROBLEM SELECTOR PLAN 2
- C/w Lantus 24 Units two times/ day ( home dose)   -  HBA1c 8.0. - Blood sugar well controlled   - C/w gabapentin at home dose

## 2017-06-30 NOTE — PROGRESS NOTE ADULT - PROBLEM SELECTOR PLAN 1
- acute right pontine stroke.   - Continue Aspirin 325 mg, Plavix 75 mg, atorvastatin 40mg  - echo within normal limits, USG carotid duplex normal.   - Awaiting discharge to acute rehab   - recommended for outpatient MRA and neuro f/up in 2 weeks

## 2017-06-30 NOTE — PROGRESS NOTE ADULT - SUBJECTIVE AND OBJECTIVE BOX
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   Patient had developed bilateral leg weakness for which he was evaluated at [facility] and discharged to rehab. From there he got a rolling walker to use  at home. Yesterday he started to feel numbness in his legs and today was not able to get up at all. He also has worsening of numbness and tingling of his fingers. Earlier in June his medications were adjusted including increased dose of metformin  for diabetes and atorvastatin in place of simvastatin for HLD.   He has a long standing history of GERD due to which he often misses his medications including last night. Otherwise denies any cough, chest pain, nausea, vomiting, abdominal pain and urinary complaints. No fall, loss of consciousness, slurred speech or seizure like activity.    Former smoker. 1PPD for 30 years before he quit 15 years ago. (25 Jun 2017 13:21)      Interval History -        Subjective:    Review of Systems:  Constitutional:        Eyes, Ears, Mouth, Throat:   Respiratory:                            Cardiovascular:   Gastrointestinal:                                     Genitourinary:   Musculoskeletal:                                    Dermatologic:   Neurological: as per above                                                                 Psychiatric:   Endocrine:              Hematologic/Lymphatic:     MEDICATIONS  (STANDING):  atorvastatin 40 milliGRAM(s) Oral at bedtime  enoxaparin Injectable 40 milliGRAM(s) SubCutaneous daily  insulin lispro (HumaLOG) corrective regimen sliding scale   SubCutaneous Before meals and at bedtime  dextrose 5%. 1000 milliLiter(s) (50 mL/Hr) IV Continuous <Continuous>  dextrose 50% Injectable 12.5 Gram(s) IV Push once  dextrose 50% Injectable 25 Gram(s) IV Push once  dextrose 50% Injectable 25 Gram(s) IV Push once  pantoprazole    Tablet 40 milliGRAM(s) Oral before breakfast  tamsulosin 0.4 milliGRAM(s) Oral at bedtime  gabapentin 300 milliGRAM(s) Oral every 12 hours  FLUoxetine 10 milliGRAM(s) Oral daily  clopidogrel Tablet 75 milliGRAM(s) Oral daily  amLODIPine   Tablet 10 milliGRAM(s) Oral daily  donepezil 10 milliGRAM(s) Oral at bedtime  aspirin 325 milliGRAM(s) Oral daily  insulin glargine Injectable (LANTUS) 24 Unit(s) SubCutaneous two times a day  docusate sodium 100 milliGRAM(s) Oral daily  senna 2 Tablet(s) Oral at bedtime  enalapril 10 milliGRAM(s) Oral daily    MEDICATIONS  (PRN):  dextrose Gel 1 Dose(s) Oral once PRN Blood Glucose LESS THAN 70 milliGRAM(s)/deciLiter  glucagon  Injectable 1 milliGRAM(s) IntraMuscular once PRN Glucose <70 milliGRAM(s)/deciLiter  acetaminophen   Tablet. 650 milliGRAM(s) Oral every 6 hours PRN Moderate Pain (4 - 6)      Allergies    No Known Allergies    Intolerances        Objective:   Vital Signs Last 24 Hrs  T(C): 36.6 (30 Jun 2017 14:00), Max: 37.1 (29 Jun 2017 21:35)  T(F): 97.8 (30 Jun 2017 14:00), Max: 98.7 (29 Jun 2017 21:35)  HR: 68 (30 Jun 2017 14:00) (67 - 73)  BP: 152/79 (30 Jun 2017 14:00) (152/79 - 163/83)  BP(mean): --  RR: 16 (30 Jun 2017 14:00) (16 - 17)  SpO2: 96% (30 Jun 2017 14:00) (96% - 97%)    General Exam:   General appearance: No acute distress                 Cardiovascular: Pedal dorsalis pulses intact bilaterally    Neurological Exam:  Mental Status: Orientated to self, date and place.  Attention intact.  No dysarthria, aphasia or neglect.  Knowledge intact.  Registration intact.  Short and long term memory grossly intact.      Cranial Nerves: CN I - not tested.  PERRL, EOMI, VFF, no nystagmus or diplopia.  No APD.  Fundi not visualized bilaterally.  CN V1-3 intact to light touch and pinprick.  No facial asymmetry.  Hearing intact to finger rub bilaterally.  Tongue, uvula and palate midline.  Sternocleidomastoid and Trapezius intact bilaterally.    Motor:   Tone: normal.                  Strength: intact throughout  Pronator drift: none                 Dysmeria: None to finger-nose-finger or heel-shin-heel  No truncal ataxia.    Tremor: No resting, postural or action tremor.  No myoclonus.    Sensation: intact to light touch, pinprick, vibration and proprioception    Deep Tendon Reflexes: 1+ bilateral biceps, triceps, brachioradialis, knee and ankle  Toes flexor bilaterally    Gait: normal and stable.      Other:    06-30    143  |  107  |  23<H>  ----------------------------<  106<H>  4.2   |  29  |  1.03    Ca    8.9      30 Jun 2017 05:41  Phos  3.3     06-30  Mg     2.3     06-30 06-30    143  |  107  |  23<H>  ----------------------------<  106<H>  4.2   |  29  |  1.03    Ca    8.9      30 Jun 2017 05:41  Phos  3.3     06-30  Mg     2.3     06-30          Radiology    Xray < from: Xray Femur 2 Views, Left (06.30.17 @ 15:09) >  FINDINGS:  No acute fracture or dislocation. The hip joints are maintained.   Visualized sacroiliac joints are preserved. Left femur is intact. No   significant soft tissue swelling. Atherosclerotic vascular calcifications.    IMPRESSION:  No radiographic evidence of fracture.    < end of copied text >    < from: Xray Lumbosacral Spine (06.30.17 @ 15:09) >    FINDINGS:   Lumbar alignment is maintained. No spondylolisthesis. Vertebral body   heights are preserved without evidence of fracture. Mild disc space   narrowing at L3-L4. Visualized sacroiliac joints are preserved.   Atherosclerotic vascular calcifications.    IMPRESSION:  No radiographic evidence of fracture.     < end of copied text >    < from: Xray Hip w/ Pelvis 3-4 Views, Bilateral (06.30.17 @ 15:09) >  FINDINGS:  No acute fracture or dislocation. The hip joints are maintained.   Visualized sacroiliac joints are preserved. Left femur is intact. No   significant soft tissue swelling. Atherosclerotic vascular calcifications.    IMPRESSION:  No radiographic evidence of fracture.    < end of copied text > Neurology Follow up note    Name  JOHNNIE TAMAYO      Subjective:  patient had fall last night, hit his back, no head trauma  has new back pain and new right leg weakness  no bowel or bladder incontinance, no saddle anesthesia  has been constipated for 2 days  no cp/ sob      MEDICATIONS  (STANDING):  atorvastatin 40 milliGRAM(s) Oral at bedtime  enoxaparin Injectable 40 milliGRAM(s) SubCutaneous daily  insulin lispro (HumaLOG) corrective regimen sliding scale   SubCutaneous Before meals and at bedtime  dextrose 5%. 1000 milliLiter(s) (50 mL/Hr) IV Continuous <Continuous>  dextrose 50% Injectable 12.5 Gram(s) IV Push once  dextrose 50% Injectable 25 Gram(s) IV Push once  dextrose 50% Injectable 25 Gram(s) IV Push once  pantoprazole    Tablet 40 milliGRAM(s) Oral before breakfast  tamsulosin 0.4 milliGRAM(s) Oral at bedtime  gabapentin 300 milliGRAM(s) Oral every 12 hours  FLUoxetine 10 milliGRAM(s) Oral daily  clopidogrel Tablet 75 milliGRAM(s) Oral daily  amLODIPine   Tablet 10 milliGRAM(s) Oral daily  donepezil 10 milliGRAM(s) Oral at bedtime  aspirin 325 milliGRAM(s) Oral daily  insulin glargine Injectable (LANTUS) 24 Unit(s) SubCutaneous two times a day  docusate sodium 100 milliGRAM(s) Oral daily  senna 2 Tablet(s) Oral at bedtime  enalapril 10 milliGRAM(s) Oral daily    MEDICATIONS  (PRN):  dextrose Gel 1 Dose(s) Oral once PRN Blood Glucose LESS THAN 70 milliGRAM(s)/deciLiter  glucagon  Injectable 1 milliGRAM(s) IntraMuscular once PRN Glucose <70 milliGRAM(s)/deciLiter  acetaminophen   Tablet. 650 milliGRAM(s) Oral every 6 hours PRN Moderate Pain (4 - 6)      Allergies    No Known Allergies    Intolerances        Objective:   Vital Signs Last 24 Hrs  T(C): 36.6 (30 Jun 2017 14:00), Max: 37.1 (29 Jun 2017 21:35)  T(F): 97.8 (30 Jun 2017 14:00), Max: 98.7 (29 Jun 2017 21:35)  HR: 68 (30 Jun 2017 14:00) (67 - 73)  BP: 152/79 (30 Jun 2017 14:00) (152/79 - 163/83)  BP(mean): --  RR: 16 (30 Jun 2017 14:00) (16 - 17)  SpO2: 96% (30 Jun 2017 14:00) (96% - 97%)    General Exam:   General appearance: No acute distress                 Cardiovascular: Pedal dorsalis pulses intact bilaterally    Neurological Exam:  Mental Status: Orientated to self, date and place.  Attention intact.      Cranial Nerves: CN I - not tested.   CN V1-3 intact to light touch.  right NLF (1)    Motor:   Tone: normal.                  Strength: right arm-strenght full, right leg: weak 4/5 ilopsoas, quad, hast, df and pf; left arm: 1/4 (nihss).  left leg 1/4 (nihss)          Dysmeria: None to finger-nose-finger     Sensation: intact to light touch  no sensory level to PP    Deep Tendon Reflexes: 1+ bilateral biceps, triceps, brachioradialis, 0 left knee and 2+ right knee  Toes flexor bilaterally      Other: no saddle anesthesia  rectal tone normal    06-30    143  |  107  |  23<H>  ----------------------------<  106<H>  4.2   |  29  |  1.03    Ca    8.9      30 Jun 2017 05:41  Phos  3.3     06-30  Mg     2.3     06-30      06-30    143  |  107  |  23<H>  ----------------------------<  106<H>  4.2   |  29  |  1.03    Ca    8.9      30 Jun 2017 05:41  Phos  3.3     06-30  Mg     2.3     06-30          Radiology    Xray < from: Xray Femur 2 Views, Left (06.30.17 @ 15:09) >  FINDINGS:  No acute fracture or dislocation. The hip joints are maintained.   Visualized sacroiliac joints are preserved. Left femur is intact. No   significant soft tissue swelling. Atherosclerotic vascular calcifications.    IMPRESSION:  No radiographic evidence of fracture.    < end of copied text >    < from: Xray Lumbosacral Spine (06.30.17 @ 15:09) >    FINDINGS:   Lumbar alignment is maintained. No spondylolisthesis. Vertebral body   heights are preserved without evidence of fracture. Mild disc space   narrowing at L3-L4. Visualized sacroiliac joints are preserved.   Atherosclerotic vascular calcifications.    IMPRESSION:  No radiographic evidence of fracture.     < end of copied text >    < from: Xray Hip w/ Pelvis 3-4 Views, Bilateral (06.30.17 @ 15:09) >  FINDINGS:  No acute fracture or dislocation. The hip joints are maintained.   Visualized sacroiliac joints are preserved. Left femur is intact. No   significant soft tissue swelling. Atherosclerotic vascular calcifications.    IMPRESSION:  No radiographic evidence of fracture.    < end of copied text >

## 2017-07-01 DIAGNOSIS — M48.06 SPINAL STENOSIS, LUMBAR REGION: ICD-10-CM

## 2017-07-01 PROCEDURE — 99233 SBSQ HOSP IP/OBS HIGH 50: CPT

## 2017-07-01 RX ORDER — AMLODIPINE BESYLATE 2.5 MG/1
10 TABLET ORAL DAILY
Qty: 0 | Refills: 0 | Status: DISCONTINUED | OUTPATIENT
Start: 2017-07-01 | End: 2017-07-03

## 2017-07-01 RX ADMIN — Medication 325 MILLIGRAM(S): at 13:57

## 2017-07-01 RX ADMIN — Medication 4 MILLIGRAM(S): at 05:27

## 2017-07-01 RX ADMIN — Medication 10 MILLIGRAM(S): at 17:13

## 2017-07-01 RX ADMIN — GABAPENTIN 300 MILLIGRAM(S): 400 CAPSULE ORAL at 05:27

## 2017-07-01 RX ADMIN — Medication 10 MILLIGRAM(S): at 05:27

## 2017-07-01 RX ADMIN — Medication 1: at 17:13

## 2017-07-01 RX ADMIN — TAMSULOSIN HYDROCHLORIDE 0.4 MILLIGRAM(S): 0.4 CAPSULE ORAL at 21:30

## 2017-07-01 RX ADMIN — INSULIN GLARGINE 24 UNIT(S): 100 INJECTION, SOLUTION SUBCUTANEOUS at 21:31

## 2017-07-01 RX ADMIN — Medication 1: at 21:31

## 2017-07-01 RX ADMIN — Medication 10 MILLIGRAM(S): at 13:57

## 2017-07-01 RX ADMIN — ATORVASTATIN CALCIUM 40 MILLIGRAM(S): 80 TABLET, FILM COATED ORAL at 21:30

## 2017-07-01 RX ADMIN — Medication 100 MILLIGRAM(S): at 13:57

## 2017-07-01 RX ADMIN — PANTOPRAZOLE SODIUM 40 MILLIGRAM(S): 20 TABLET, DELAYED RELEASE ORAL at 05:27

## 2017-07-01 RX ADMIN — DONEPEZIL HYDROCHLORIDE 10 MILLIGRAM(S): 10 TABLET, FILM COATED ORAL at 21:30

## 2017-07-01 RX ADMIN — AMLODIPINE BESYLATE 10 MILLIGRAM(S): 2.5 TABLET ORAL at 17:12

## 2017-07-01 RX ADMIN — GABAPENTIN 300 MILLIGRAM(S): 400 CAPSULE ORAL at 17:13

## 2017-07-01 RX ADMIN — ENOXAPARIN SODIUM 40 MILLIGRAM(S): 100 INJECTION SUBCUTANEOUS at 13:57

## 2017-07-01 RX ADMIN — Medication 2: at 14:07

## 2017-07-01 RX ADMIN — Medication 650 MILLIGRAM(S): at 00:17

## 2017-07-01 RX ADMIN — SENNA PLUS 2 TABLET(S): 8.6 TABLET ORAL at 21:30

## 2017-07-01 RX ADMIN — CLOPIDOGREL BISULFATE 75 MILLIGRAM(S): 75 TABLET, FILM COATED ORAL at 13:58

## 2017-07-01 RX ADMIN — INSULIN GLARGINE 24 UNIT(S): 100 INJECTION, SOLUTION SUBCUTANEOUS at 08:39

## 2017-07-01 NOTE — PROGRESS NOTE ADULT - SUBJECTIVE AND OBJECTIVE BOX
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   Patient had developed bilateral leg weakness for which he was evaluated at [facility] and discharged to rehab. From there he got a rolling walker to use  at home. Yesterday he started to feel numbness in his legs and today was not able to get up at all. He also has worsening of numbness and tingling of his fingers. Earlier in June his medications were adjusted including increased dose of metformin  for diabetes and atorvastatin in place of simvastatin for HLD.   He has a long standing history of GERD due to which he often misses his medications including last night. Otherwise denies any cough, chest pain, nausea, vomiting, abdominal pain and urinary complaints. No fall, loss of consciousness, slurred speech or seizure like activity.    Former smoker. 1PPD for 30 years before he quit 15 years ago. (25 Jun 2017 13:21)      INTERVAL HPI/OVERNIGHT EVENTS:    On June 29, 2017 the patient sustained a fall from a sitting position in a chair.  He attempted to get up by kneeling, after which he c/o back pain.    Patient was re-evaluated by neurology yesterday.  Because of a concern of LE weakness and back pain, MRI was performed to exclude cord compression.    Today Dr. Hall, the covering neurologist, and I examined the patient at bedside.  He moves all extremities.  The patient expresses concern that he is unable to feed himself. He states that because of his limitedhe misses his mouth and food drops on his chest.   He is intermittently apologetic and tearful       T(C): 36.9 (07-01-17 @ 05:20), Max: 36.9 (06-30-17 @ 21:33)  HR: 74 (07-01-17 @ 05:20) (72 - 74)  BP: 158/82 (07-01-17 @ 05:20) (158/82 - 163/76)  RR: 16 (07-01-17 @ 05:20) (16 - 16)  SpO2: 96% (07-01-17 @ 05:20) (96% - 98%)  Wt(kg): --  I&O's Summary      REVIEW OF SYSTEMS: denies fever, chills, SOB, palpitations, chest pain, abdominal pain, nausea, vomitting, diarrhea, constipation, dizziness  C/o low back pain.     MEDICATIONS  (STANDING):  atorvastatin 40 milliGRAM(s) Oral at bedtime  enoxaparin Injectable 40 milliGRAM(s) SubCutaneous daily  insulin lispro (HumaLOG) corrective regimen sliding scale   SubCutaneous Before meals and at bedtime  dextrose 5%. 1000 milliLiter(s) (50 mL/Hr) IV Continuous <Continuous>  dextrose 50% Injectable 12.5 Gram(s) IV Push once  dextrose 50% Injectable 25 Gram(s) IV Push once  dextrose 50% Injectable 25 Gram(s) IV Push once  pantoprazole    Tablet 40 milliGRAM(s) Oral before breakfast  tamsulosin 0.4 milliGRAM(s) Oral at bedtime  gabapentin 300 milliGRAM(s) Oral every 12 hours  FLUoxetine 10 milliGRAM(s) Oral daily  clopidogrel Tablet 75 milliGRAM(s) Oral daily  amLODIPine   Tablet 10 milliGRAM(s) Oral daily  donepezil 10 milliGRAM(s) Oral at bedtime  aspirin 325 milliGRAM(s) Oral daily  insulin glargine Injectable (LANTUS) 24 Unit(s) SubCutaneous two times a day  docusate sodium 100 milliGRAM(s) Oral daily  senna 2 Tablet(s) Oral at bedtime  enalapril 10 milliGRAM(s) Oral daily    MEDICATIONS  (PRN):  dextrose Gel 1 Dose(s) Oral once PRN Blood Glucose LESS THAN 70 milliGRAM(s)/deciLiter  glucagon  Injectable 1 milliGRAM(s) IntraMuscular once PRN Glucose <70 milliGRAM(s)/deciLiter  acetaminophen   Tablet. 650 milliGRAM(s) Oral every 6 hours PRN Moderate Pain (4 - 6)      PHYSICAL EXAM:  GENERAL: NAD, limited vision  HEAD:  Atraumatic, Normocephalic  EYES: EOMI, PERRLA, conjunctiva and sclera clear  ENMT: No tonsillar erythema, exudates, or enlargement; Moist mucous membranes, Good dentition, No lesions  NECK: Supple, No JVD,  CHEST/LUNG: Clear to percussion bilaterally; No rales, rhonchi, wheezing, or rubs  HEART: Regular rate and rhythm; No murmurs, rubs, or gallops  ABDOMEN: Soft, Nontender, Nondistended; Bowel sounds present  EXTREMITIES:  2+ Peripheral Pulses, No clubbing, cyanosis, or edema  SKIN: No rashes or lesions  NERVOUS SYSTEM:  Alert & Oriented X3, Good concentration; Mood is labile.  Moves all extemities.  Decreased sensation at the distal LE's.     LABS:                        11.4   6.6   )-----------( 246      ( 30 Jun 2017 05:41 )             33.8     06-30    143  |  107  |  23<H>  ----------------------------<  106<H>  4.2   |  29  |  1.03    Ca    8.9      30 Jun 2017 05:41  Phos  3.3     06-30  Mg     2.3     06-30          CAPILLARY BLOOD GLUCOSE  206 (01 Jul 2017 11:44)  161 (01 Jul 2017 08:38)  186 (30 Jun 2017 16:09)      EXAM:  LUMBO-SACRAL SPINE MRI W O CON                            PROCEDURE DATE:  06/30/2017        INTERPRETATION:  MRI lumbar spine without contrast    History injury, back pain    There is a transitional L5 vertebral body. There is no compression   deformity or marrow infiltration or edema. The spinal canal is slightly   congenitally narrow. The conus is normal. There is minimal dorsal disc   osteophyte complex at T10-11 exerting mild mass effect on the ventral   thecal sac without spinal stenosis or cord impingement. The T11-12 level   is normal.    The T12-L1 and L1-L2 discs are normal. There is mild ligamentous   hypertrophy at both levels without spinal canal or foraminal stenosis.    Disc height and signal is maintained at L2-L3. There is mild bilateral   far lateral osteophyte complex and moderate to severe ligamentous and   facet hypertrophy. Together these changes result in mild to moderate   spinal canal and bilateral foraminal stenosis    Disc height is preserved at L3-L4 with mild degenerative loss of disc   signal. There is minimal spondylolisthesis related to moderate to severe   facet and ligamentous hypertrophy. These changes together result in   severe spinal stenosis with associated lateral recess stenosis and mild   to moderate left and mild right foraminal stenosis    Disc height and signal is maintained at L4-L5. There is minimal   degenerative annular disc bulging without significant mass effect on the   ventral thecal sac. There is severe bilateral facet arthropathy resulting   in mild bilateral foraminal stenosis without central canal stenosis    The transitional L5-S1 level is normal.    IMPRESSION:  Transitional L5 vertebral body. Spondylosis resulting in spinal canal and   foraminal stenosis as above without acute abnormality              ANASTACIO PELAYO M.D., ATTENDING RADIOLOGIST  This document has been electronically signed. Jul 1 2017  9:11AM

## 2017-07-02 PROCEDURE — 99233 SBSQ HOSP IP/OBS HIGH 50: CPT | Mod: GC

## 2017-07-02 RX ORDER — LACTULOSE 10 G/15ML
10 SOLUTION ORAL ONCE
Qty: 0 | Refills: 0 | Status: COMPLETED | OUTPATIENT
Start: 2017-07-02 | End: 2017-07-02

## 2017-07-02 RX ADMIN — GABAPENTIN 300 MILLIGRAM(S): 400 CAPSULE ORAL at 17:23

## 2017-07-02 RX ADMIN — DONEPEZIL HYDROCHLORIDE 10 MILLIGRAM(S): 10 TABLET, FILM COATED ORAL at 21:38

## 2017-07-02 RX ADMIN — Medication 10 MILLIGRAM(S): at 11:43

## 2017-07-02 RX ADMIN — Medication 1: at 21:39

## 2017-07-02 RX ADMIN — Medication 1 ENEMA: at 21:00

## 2017-07-02 RX ADMIN — LACTULOSE 10 GRAM(S): 10 SOLUTION ORAL at 17:23

## 2017-07-02 RX ADMIN — CLOPIDOGREL BISULFATE 75 MILLIGRAM(S): 75 TABLET, FILM COATED ORAL at 11:43

## 2017-07-02 RX ADMIN — ATORVASTATIN CALCIUM 40 MILLIGRAM(S): 80 TABLET, FILM COATED ORAL at 21:38

## 2017-07-02 RX ADMIN — TAMSULOSIN HYDROCHLORIDE 0.4 MILLIGRAM(S): 0.4 CAPSULE ORAL at 21:38

## 2017-07-02 RX ADMIN — SENNA PLUS 2 TABLET(S): 8.6 TABLET ORAL at 21:39

## 2017-07-02 RX ADMIN — Medication 10 MILLIGRAM(S): at 05:37

## 2017-07-02 RX ADMIN — ENOXAPARIN SODIUM 40 MILLIGRAM(S): 100 INJECTION SUBCUTANEOUS at 11:43

## 2017-07-02 RX ADMIN — Medication 650 MILLIGRAM(S): at 23:55

## 2017-07-02 RX ADMIN — Medication 325 MILLIGRAM(S): at 11:43

## 2017-07-02 RX ADMIN — PANTOPRAZOLE SODIUM 40 MILLIGRAM(S): 20 TABLET, DELAYED RELEASE ORAL at 05:38

## 2017-07-02 RX ADMIN — INSULIN GLARGINE 24 UNIT(S): 100 INJECTION, SOLUTION SUBCUTANEOUS at 09:03

## 2017-07-02 RX ADMIN — GABAPENTIN 300 MILLIGRAM(S): 400 CAPSULE ORAL at 05:37

## 2017-07-02 RX ADMIN — Medication 650 MILLIGRAM(S): at 23:15

## 2017-07-02 RX ADMIN — AMLODIPINE BESYLATE 10 MILLIGRAM(S): 2.5 TABLET ORAL at 05:37

## 2017-07-02 RX ADMIN — Medication 100 MILLIGRAM(S): at 11:43

## 2017-07-02 RX ADMIN — INSULIN GLARGINE 24 UNIT(S): 100 INJECTION, SOLUTION SUBCUTANEOUS at 21:39

## 2017-07-02 RX ADMIN — Medication 5 MILLIGRAM(S): at 21:39

## 2017-07-02 NOTE — PROGRESS NOTE ADULT - PROBLEM SELECTOR PLAN 6
senna/kari  prefers suppository
pt with fall yesterday now complaints of back pain and pain in the left leg.   ordered Xray of the lumbosacral and pelvis and left femur.   neuro eval. Dr. valencia

## 2017-07-02 NOTE — PROGRESS NOTE ADULT - SUBJECTIVE AND OBJECTIVE BOX
Chief Complaint: Patient is a 71y old  Male who presents with a chief complaint of Worsening leg weakness (26 Jun 2017 07:17)      INTERVAL HPI/OVERNIGHT EVENTS:    REVIEW OF SYSTEMS:  CONSTITUTIONAL: No fever, weight loss, or fatigue  RESPIRATORY: No cough, wheezing, chills or hemoptysis; No shortness of breath  CARDIOVASCULAR: No chest pain, palpitations, dizziness, or leg swelling  GASTROINTESTINAL: No abdominal or epigastric pain. No nausea, vomiting, or hematemesis;   No diarrhea or constipation. No melena or hematochezia.  GENITOURINARY: No dysuria, frequency, hematuria, or incontinence  NEUROLOGICAL: No headaches, memory loss, loss of strength, numbness, or tremors  SKIN: No itching, burning, rashes, or lesions   MUSCULOSKELETAL: No joint pain or swelling; No muscle, back, or extremity pain  PSYCHIATRIC: No depression, anxiety, mood swings, or difficulty sleeping  HEME/LYMPH: No easy bruising, or bleeding gums    MEDICATIONS  (STANDING):  atorvastatin 40 milliGRAM(s) Oral at bedtime  enoxaparin Injectable 40 milliGRAM(s) SubCutaneous daily  insulin lispro (HumaLOG) corrective regimen sliding scale   SubCutaneous Before meals and at bedtime  dextrose 5%. 1000 milliLiter(s) (50 mL/Hr) IV Continuous <Continuous>  dextrose 50% Injectable 12.5 Gram(s) IV Push once  dextrose 50% Injectable 25 Gram(s) IV Push once  dextrose 50% Injectable 25 Gram(s) IV Push once  pantoprazole    Tablet 40 milliGRAM(s) Oral before breakfast  tamsulosin 0.4 milliGRAM(s) Oral at bedtime  gabapentin 300 milliGRAM(s) Oral every 12 hours  FLUoxetine 10 milliGRAM(s) Oral daily  clopidogrel Tablet 75 milliGRAM(s) Oral daily  donepezil 10 milliGRAM(s) Oral at bedtime  aspirin 325 milliGRAM(s) Oral daily  insulin glargine Injectable (LANTUS) 24 Unit(s) SubCutaneous two times a day  docusate sodium 100 milliGRAM(s) Oral daily  senna 2 Tablet(s) Oral at bedtime  amLODIPine   Tablet 10 milliGRAM(s) Oral daily  enalapril 10 milliGRAM(s) Oral daily      MEDICATIONS  (PRN):  dextrose Gel 1 Dose(s) Oral once PRN Blood Glucose LESS THAN 70 milliGRAM(s)/deciLiter  glucagon  Injectable 1 milliGRAM(s) IntraMuscular once PRN Glucose <70 milliGRAM(s)/deciLiter  acetaminophen   Tablet. 650 milliGRAM(s) Oral every 6 hours PRN Moderate Pain (4 - 6)        romero [  ], ngt [  ], peg [  ], et tube [  ], cent line [  ], picc line [  ] DVT ppx [  ]    Vital Signs Last 24 Hrs  T(C): 36.3 (02 Jul 2017 14:28), Max: 36.7 (01 Jul 2017 20:45)  T(F): 97.3 (02 Jul 2017 14:28), Max: 98.1 (02 Jul 2017 05:05)  HR: 62 (02 Jul 2017 14:28) (62 - 76)  BP: 150/54 (02 Jul 2017 14:28) (139/68 - 163/76)  BP(mean): --  RR: 16 (02 Jul 2017 14:28) (16 - 16)  SpO2: 99% (02 Jul 2017 14:28) (93% - 99%)  I&O's Summary      PHYSICAL EXAM:  GENERAL: AAOx3, Comfortable, no acute distress   HEAD:  Normocephalic, atraumatic  EYES: EOMI, PERRLA  HEENT: Moist mucous membranes  NECK: Supple, No JVD  NERVOUS SYSTEM:   Motor Strength 5/5 B/L upper and lower extremities  CHEST/LUNG: Clear to auscultation bilaterally, no wheezes, rales, rhonchi, crackles  HEART: S1S2, Regular rate and rhythm, no murmurs, rubs, gallops  ABDOMEN: Soft, Nontender, Nondistended, Bowel sounds present  EXTREMITIES:   No clubbing, cyanosis, or edema, no calf tenderness  MUSCULOSKELTAL- No muscle tenderness, no joint tenderness  SKIN-no rash    LABS:              CAPILLARY BLOOD GLUCOSE  150 (02 Jul 2017 16:30)  146 (02 Jul 2017 11:43)  89 (02 Jul 2017 08:00)  162 (01 Jul 2017 21:01)                RADIOLOGY & ADDITIONAL TESTS:    Imaging Personally Reviewed:  [ ] YES  [ ] NO    Consultant(s) Notes Reviewed:  [ ] YES  [ ] NO      Care Discussed with Consultants/Other Providers [ ] YES  [ ] NO Chief Complaint: Patient is a 71y old  Male who presents with a chief complaint of Worsening leg weakness (26 Jun 2017 07:17)      INTERVAL HPI/OVERNIGHT EVENTS:  Back pain controlled. reports some left hand weakness which has been chronic. Review of imaging negative for infarct on MRI nasir and negative for cored compression on MRI lumbar spine. No urinary/bladder incontinence/retentions. + constipation--> wants suppository this evening.  No fevers/chills/nausea/vomiting, LH, chest pain, sob.    MEDICATIONS  (STANDING):  atorvastatin 40 milliGRAM(s) Oral at bedtime  enoxaparin Injectable 40 milliGRAM(s) SubCutaneous daily  insulin lispro (HumaLOG) corrective regimen sliding scale   SubCutaneous Before meals and at bedtime  dextrose 5%. 1000 milliLiter(s) (50 mL/Hr) IV Continuous <Continuous>  dextrose 50% Injectable 12.5 Gram(s) IV Push once  dextrose 50% Injectable 25 Gram(s) IV Push once  dextrose 50% Injectable 25 Gram(s) IV Push once  pantoprazole    Tablet 40 milliGRAM(s) Oral before breakfast  tamsulosin 0.4 milliGRAM(s) Oral at bedtime  gabapentin 300 milliGRAM(s) Oral every 12 hours  FLUoxetine 10 milliGRAM(s) Oral daily  clopidogrel Tablet 75 milliGRAM(s) Oral daily  donepezil 10 milliGRAM(s) Oral at bedtime  aspirin 325 milliGRAM(s) Oral daily  insulin glargine Injectable (LANTUS) 24 Unit(s) SubCutaneous two times a day  docusate sodium 100 milliGRAM(s) Oral daily  senna 2 Tablet(s) Oral at bedtime  amLODIPine   Tablet 10 milliGRAM(s) Oral daily  enalapril 10 milliGRAM(s) Oral daily      MEDICATIONS  (PRN):  dextrose Gel 1 Dose(s) Oral once PRN Blood Glucose LESS THAN 70 milliGRAM(s)/deciLiter  glucagon  Injectable 1 milliGRAM(s) IntraMuscular once PRN Glucose <70 milliGRAM(s)/deciLiter  acetaminophen   Tablet. 650 milliGRAM(s) Oral every 6 hours PRN Moderate Pain (4 - 6)        romero [  ], ngt [  ], peg [  ], et tube [  ], cent line [  ], picc line [  ] DVT ppx [X  ]    Vital Signs Last 24 Hrs  T(C): 36.3 (02 Jul 2017 14:28), Max: 36.7 (01 Jul 2017 20:45)  T(F): 97.3 (02 Jul 2017 14:28), Max: 98.1 (02 Jul 2017 05:05)  HR: 62 (02 Jul 2017 14:28) (62 - 76)  BP: 150/54 (02 Jul 2017 14:28) (139/68 - 163/76)  BP(mean): --  RR: 16 (02 Jul 2017 14:28) (16 - 16)  SpO2: 99% (02 Jul 2017 14:28) (93% - 99%)  I&O's Summary      PHYSICAL EXAM:  GENERAL: AAOx3, Comfortable, no acute distress   S1S2 RRR  CTAB/l no accessory muscle use, soft,no wheezes/rhonchi/crackles  soft, NT, ND, + BS, no guarding/rebound  no LE edema/cyanosis/clubbing  4+/5 left arm, 5/5 RUE/ LLE/RLE  SKIN-no rash    LABS      CAPILLARY BLOOD GLUCOSE  150 (02 Jul 2017 16:30)  146 (02 Jul 2017 11:43)  89 (02 Jul 2017 08:00)  162 (01 Jul 2017 21:01)                RADIOLOGY & ADDITIONAL TESTS:    Imaging Personally Reviewed:  [X ] YES  [ ] NO    Consultant(s) Notes Reviewed:  [X ] YES  [ ] NO      Care Discussed with Consultants/Other Providers [ ] YES  [ ] NO

## 2017-07-02 NOTE — PROGRESS NOTE ADULT - PROBLEM SELECTOR PLAN 3
May be a cause of pain.  No evidence of cord compression. May be a cause of pain.  No evidence of cord compression.  prn tylenol, physical therapy  -plan for discharge to [facility]

## 2017-07-02 NOTE — PROGRESS NOTE ADULT - PROBLEM SELECTOR PLAN 1
Patient is s/p fall to floor from a chair.    Will enforce fall precautions. s/p fall from chair several days ago. No head trauma, new neurologic deficits  -fall precautions  -physical therapy

## 2017-07-03 VITALS
OXYGEN SATURATION: 99 % | RESPIRATION RATE: 19 BRPM | DIASTOLIC BLOOD PRESSURE: 55 MMHG | TEMPERATURE: 98 F | HEART RATE: 75 BPM | SYSTOLIC BLOOD PRESSURE: 144 MMHG

## 2017-07-03 DIAGNOSIS — K59.00 CONSTIPATION, UNSPECIFIED: ICD-10-CM

## 2017-07-03 LAB
ANION GAP SERPL CALC-SCNC: 11 MMOL/L — SIGNIFICANT CHANGE UP (ref 5–17)
BUN SERPL-MCNC: 26 MG/DL — HIGH (ref 7–18)
CALCIUM SERPL-MCNC: 8.9 MG/DL — SIGNIFICANT CHANGE UP (ref 8.4–10.5)
CHLORIDE SERPL-SCNC: 108 MMOL/L — SIGNIFICANT CHANGE UP (ref 96–108)
CO2 SERPL-SCNC: 24 MMOL/L — SIGNIFICANT CHANGE UP (ref 22–31)
CREAT SERPL-MCNC: 1.02 MG/DL — SIGNIFICANT CHANGE UP (ref 0.5–1.3)
GLUCOSE SERPL-MCNC: 97 MG/DL — SIGNIFICANT CHANGE UP (ref 70–99)
HCT VFR BLD CALC: 32.1 % — LOW (ref 39–50)
HGB BLD-MCNC: 10.9 G/DL — LOW (ref 13–17)
MAGNESIUM SERPL-MCNC: 2.2 MG/DL — SIGNIFICANT CHANGE UP (ref 1.6–2.6)
MCHC RBC-ENTMCNC: 31.3 PG — SIGNIFICANT CHANGE UP (ref 27–34)
MCHC RBC-ENTMCNC: 34 GM/DL — SIGNIFICANT CHANGE UP (ref 32–36)
MCV RBC AUTO: 92.1 FL — SIGNIFICANT CHANGE UP (ref 80–100)
PHOSPHATE SERPL-MCNC: 3.5 MG/DL — SIGNIFICANT CHANGE UP (ref 2.5–4.5)
PLATELET # BLD AUTO: 289 K/UL — SIGNIFICANT CHANGE UP (ref 150–400)
POTASSIUM SERPL-MCNC: 4.2 MMOL/L — SIGNIFICANT CHANGE UP (ref 3.5–5.3)
POTASSIUM SERPL-SCNC: 4.2 MMOL/L — SIGNIFICANT CHANGE UP (ref 3.5–5.3)
RBC # BLD: 3.48 M/UL — LOW (ref 4.2–5.8)
RBC # FLD: 12.5 % — SIGNIFICANT CHANGE UP (ref 10.3–14.5)
SODIUM SERPL-SCNC: 143 MMOL/L — SIGNIFICANT CHANGE UP (ref 135–145)
WBC # BLD: 6.9 K/UL — SIGNIFICANT CHANGE UP (ref 3.8–10.5)
WBC # FLD AUTO: 6.9 K/UL — SIGNIFICANT CHANGE UP (ref 3.8–10.5)

## 2017-07-03 PROCEDURE — 97162 PT EVAL MOD COMPLEX 30 MIN: CPT

## 2017-07-03 PROCEDURE — 97530 THERAPEUTIC ACTIVITIES: CPT

## 2017-07-03 PROCEDURE — 84443 ASSAY THYROID STIM HORMONE: CPT

## 2017-07-03 PROCEDURE — 71045 X-RAY EXAM CHEST 1 VIEW: CPT

## 2017-07-03 PROCEDURE — 70450 CT HEAD/BRAIN W/O DYE: CPT

## 2017-07-03 PROCEDURE — 70551 MRI BRAIN STEM W/O DYE: CPT

## 2017-07-03 PROCEDURE — 93306 TTE W/DOPPLER COMPLETE: CPT

## 2017-07-03 PROCEDURE — 84100 ASSAY OF PHOSPHORUS: CPT

## 2017-07-03 PROCEDURE — 93005 ELECTROCARDIOGRAM TRACING: CPT

## 2017-07-03 PROCEDURE — 36415 COLL VENOUS BLD VENIPUNCTURE: CPT

## 2017-07-03 PROCEDURE — 73522 X-RAY EXAM HIPS BI 3-4 VIEWS: CPT

## 2017-07-03 PROCEDURE — 99285 EMERGENCY DEPT VISIT HI MDM: CPT | Mod: 25

## 2017-07-03 PROCEDURE — 80053 COMPREHEN METABOLIC PANEL: CPT

## 2017-07-03 PROCEDURE — 70544 MR ANGIOGRAPHY HEAD W/O DYE: CPT | Mod: 26

## 2017-07-03 PROCEDURE — 80061 LIPID PANEL: CPT

## 2017-07-03 PROCEDURE — 85027 COMPLETE CBC AUTOMATED: CPT

## 2017-07-03 PROCEDURE — 99239 HOSP IP/OBS DSCHRG MGMT >30: CPT

## 2017-07-03 PROCEDURE — 85730 THROMBOPLASTIN TIME PARTIAL: CPT

## 2017-07-03 PROCEDURE — 80048 BASIC METABOLIC PNL TOTAL CA: CPT

## 2017-07-03 PROCEDURE — 70544 MR ANGIOGRAPHY HEAD W/O DYE: CPT

## 2017-07-03 PROCEDURE — 72148 MRI LUMBAR SPINE W/O DYE: CPT

## 2017-07-03 PROCEDURE — 85610 PROTHROMBIN TIME: CPT

## 2017-07-03 PROCEDURE — 73552 X-RAY EXAM OF FEMUR 2/>: CPT

## 2017-07-03 PROCEDURE — 72110 X-RAY EXAM L-2 SPINE 4/>VWS: CPT

## 2017-07-03 PROCEDURE — 81001 URINALYSIS AUTO W/SCOPE: CPT

## 2017-07-03 PROCEDURE — 83036 HEMOGLOBIN GLYCOSYLATED A1C: CPT

## 2017-07-03 PROCEDURE — 93880 EXTRACRANIAL BILAT STUDY: CPT

## 2017-07-03 PROCEDURE — 83735 ASSAY OF MAGNESIUM: CPT

## 2017-07-03 PROCEDURE — 82607 VITAMIN B-12: CPT

## 2017-07-03 RX ADMIN — AMLODIPINE BESYLATE 10 MILLIGRAM(S): 2.5 TABLET ORAL at 05:47

## 2017-07-03 RX ADMIN — CLOPIDOGREL BISULFATE 75 MILLIGRAM(S): 75 TABLET, FILM COATED ORAL at 13:18

## 2017-07-03 RX ADMIN — PANTOPRAZOLE SODIUM 40 MILLIGRAM(S): 20 TABLET, DELAYED RELEASE ORAL at 05:47

## 2017-07-03 RX ADMIN — Medication 325 MILLIGRAM(S): at 13:18

## 2017-07-03 RX ADMIN — GABAPENTIN 300 MILLIGRAM(S): 400 CAPSULE ORAL at 05:47

## 2017-07-03 RX ADMIN — Medication 100 MILLIGRAM(S): at 13:18

## 2017-07-03 RX ADMIN — ENOXAPARIN SODIUM 40 MILLIGRAM(S): 100 INJECTION SUBCUTANEOUS at 13:17

## 2017-07-03 RX ADMIN — INSULIN GLARGINE 24 UNIT(S): 100 INJECTION, SOLUTION SUBCUTANEOUS at 09:32

## 2017-07-03 RX ADMIN — Medication 10 MILLIGRAM(S): at 13:18

## 2017-07-03 RX ADMIN — Medication 10 MILLIGRAM(S): at 05:47

## 2017-07-03 NOTE — PROGRESS NOTE ADULT - NSHPATTENDINGPLANDISCUSS_GEN_ALL_CORE
Case management, Physical therapy, resident
Patient, resident, Dr. Hall.
Patient, wife, nurse, , resident.
Dr. Smith.

## 2017-07-03 NOTE — PROGRESS NOTE ADULT - ATTENDING COMMENTS
I have spoken to the patient and his wife.  With their written consent, I have completed paters attesting to his hospitalization and diagnosis in order to satisfy the query of the company that insured their travel palns for later this week.

## 2017-07-03 NOTE — PROGRESS NOTE ADULT - PROBLEM SELECTOR PLAN 4
Stable
- IMPROVE VTE is 2  - continue lovenox   - continue protonix for GI ppx
Patient fell from a chair to the floor.  Tried to get up.  No evidence of fracture or cord compression.  Patient will be able to learn safe transfers in rehab.
Stable

## 2017-07-03 NOTE — PROGRESS NOTE ADULT - PROBLEM SELECTOR PROBLEM 1
Fall
CVA (cerebral vascular accident)
Fall

## 2017-07-03 NOTE — PROGRESS NOTE ADULT - PROVIDER SPECIALTY LIST ADULT
Hospitalist
Internal Medicine
Neurology
Neurology
Internal Medicine

## 2017-07-03 NOTE — PROGRESS NOTE ADULT - PROBLEM SELECTOR PROBLEM 2
CVA (cerebral vascular accident)
Diabetes
CVA (cerebral vascular accident)
Spinal stenosis at L4-L5 level

## 2017-07-03 NOTE — PROGRESS NOTE ADULT - PROBLEM SELECTOR PROBLEM 3
Spinal stenosis at L4-L5 level
Hypertension
Diabetes
Spinal stenosis at L4-L5 level

## 2017-07-03 NOTE — PROGRESS NOTE ADULT - SUBJECTIVE AND OBJECTIVE BOX
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   Patient had developed bilateral leg weakness for which he was evaluated at [facility] and discharged to rehab. From there he got a rolling walker to use  at home. Yesterday he started to feel numbness in his legs and today was not able to get up at all. He also has worsening of numbness and tingling of his fingers. Earlier in June his medications were adjusted including increased dose of metformin  for diabetes and atorvastatin in place of simvastatin for HLD.   He has a long standing history of GERD due to which he often misses his medications including last night. Otherwise denies any cough, chest pain, nausea, vomiting, abdominal pain and urinary complaints. No fall, loss of consciousness, slurred speech or seizure like activity.    Former smoker. 1PPD for 30 years before he quit 15 years ago. (25 Jun 2017 13:21)      Patient is a 71y old  Male who presents with a chief complaint of Worsening leg weakness (26 Jun 2017 07:17)      INTERVAL HPI/OVERNIGHT EVENTS:  T(C): 36.8 (07-03-17 @ 14:18), Max: 36.9 (07-02-17 @ 22:10)  HR: 75 (07-03-17 @ 14:18) (69 - 82)  BP: 144/55 (07-03-17 @ 14:18) (144/55 - 152/72)  RR: 19 (07-03-17 @ 14:18) (16 - 19)  SpO2: 99% (07-03-17 @ 14:18) (98% - 99%)  Wt(kg): --  I&O's Summary    02 Jul 2017 07:01  -  03 Jul 2017 07:00  --------------------------------------------------------  IN: 0 mL / OUT: 1200 mL / NET: -1200 mL        REVIEW OF SYSTEMS: denies fever, chills, SOB, palpitations, chest pain, abdominal pain, nausea, vomitting, diarrhea, constipation, dizziness    MEDICATIONS  (STANDING):  atorvastatin 40 milliGRAM(s) Oral at bedtime  enoxaparin Injectable 40 milliGRAM(s) SubCutaneous daily  insulin lispro (HumaLOG) corrective regimen sliding scale   SubCutaneous Before meals and at bedtime  dextrose 5%. 1000 milliLiter(s) (50 mL/Hr) IV Continuous <Continuous>  dextrose 50% Injectable 12.5 Gram(s) IV Push once  dextrose 50% Injectable 25 Gram(s) IV Push once  dextrose 50% Injectable 25 Gram(s) IV Push once  pantoprazole    Tablet 40 milliGRAM(s) Oral before breakfast  tamsulosin 0.4 milliGRAM(s) Oral at bedtime  gabapentin 300 milliGRAM(s) Oral every 12 hours  FLUoxetine 10 milliGRAM(s) Oral daily  clopidogrel Tablet 75 milliGRAM(s) Oral daily  donepezil 10 milliGRAM(s) Oral at bedtime  aspirin 325 milliGRAM(s) Oral daily  insulin glargine Injectable (LANTUS) 24 Unit(s) SubCutaneous two times a day  docusate sodium 100 milliGRAM(s) Oral daily  senna 2 Tablet(s) Oral at bedtime  amLODIPine   Tablet 10 milliGRAM(s) Oral daily  enalapril 10 milliGRAM(s) Oral daily    MEDICATIONS  (PRN):  dextrose Gel 1 Dose(s) Oral once PRN Blood Glucose LESS THAN 70 milliGRAM(s)/deciLiter  glucagon  Injectable 1 milliGRAM(s) IntraMuscular once PRN Glucose <70 milliGRAM(s)/deciLiter  acetaminophen   Tablet. 650 milliGRAM(s) Oral every 6 hours PRN Moderate Pain (4 - 6)  bisacodyl Suppository 10 milliGRAM(s) Rectal daily PRN Constipation      PHYSICAL EXAM:  GENERAL: NAD, well-groomed, well-developed  HEAD:  Atraumatic, Normocephalic  EYES: EOMI, PERRLA, conjunctiva and sclera clear  ENMT: No tonsillar erythema, exudates, or enlargement; Moist mucous membranes, Good dentition, No lesions  NECK: Supple, No JVD, Normal thyroid  NERVOUS SYSTEM:  Alert & Oriented X3, Good concentration; Motor Strength 5/5 B/L upper and lower extremities; DTRs 2+ intact and symmetric  CHEST/LUNG: Clear to percussion bilaterally; No rales, rhonchi, wheezing, or rubs  HEART: Regular rate and rhythm; No murmurs, rubs, or gallops  ABDOMEN: Soft, Nontender, Nondistended; Bowel sounds present  EXTREMITIES:  2+ Peripheral Pulses, No clubbing, cyanosis, or edema  LYMPH: No lymphadenopathy noted  SKIN: No rashes or lesions  LABS:                        10.9   6.9   )-----------( 289      ( 03 Jul 2017 06:57 )             32.1     07-03    143  |  108  |  26<H>  ----------------------------<  97  4.2   |  24  |  1.02    Ca    8.9      03 Jul 2017 06:57  Phos  3.5     07-03  Mg     2.2     07-03          CAPILLARY BLOOD GLUCOSE  126 (03 Jul 2017 12:35)  96 (03 Jul 2017 08:21)  183 (02 Jul 2017 21:00)  150 (02 Jul 2017 16:30)

## 2017-07-03 NOTE — DIETITIAN INITIAL EVALUATION ADULT. - OTHER INFO
Patient seen for LOS x 8days. Pt. from home lives with wife walks with walker. Visited pt. with wife at bedside, per pt. appetite good, eats 3 meals daily with snacks in between, denies nausea/vomiting or diarrhea but complaints of constipation PTA, received suppository in ED, stated  Lbs >3yrs & stable, dx. as diabetic >10yrs & on metformin at home, per wife received diabetic education PTA but accepted handout & teaching on [employer], both pt. & wife receptive to information given. In house pt. consuming >50% of meals & tolerating, obtained food preferences, skin intact.

## 2017-07-03 NOTE — PROGRESS NOTE ADULT - PROBLEM SELECTOR PROBLEM 4
Hypertension
Prophylactic measure
Fall
Hypertension

## 2017-07-03 NOTE — PROGRESS NOTE ADULT - PROBLEM SELECTOR PLAN 5
Stable
- C/w home meds aricept 10 mg
Stable
controlled
- C/w home meds aricept 10 mg
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
## 2017-07-07 DIAGNOSIS — I63.9 CEREBRAL INFARCTION, UNSPECIFIED: ICD-10-CM

## 2017-07-07 DIAGNOSIS — I10 ESSENTIAL (PRIMARY) HYPERTENSION: ICD-10-CM

## 2017-07-07 DIAGNOSIS — H54.8 LEGAL BLINDNESS, AS DEFINED IN USA: ICD-10-CM

## 2017-07-07 DIAGNOSIS — Z87.891 PERSONAL HISTORY OF NICOTINE DEPENDENCE: ICD-10-CM

## 2017-07-07 DIAGNOSIS — Z91.14 PATIENT'S OTHER NONCOMPLIANCE WITH MEDICATION REGIMEN: ICD-10-CM

## 2017-07-07 DIAGNOSIS — E11.9 TYPE 2 DIABETES MELLITUS WITHOUT COMPLICATIONS: ICD-10-CM

## 2017-07-07 DIAGNOSIS — M48.06 SPINAL STENOSIS, LUMBAR REGION: ICD-10-CM

## 2017-07-07 DIAGNOSIS — K21.9 GASTRO-ESOPHAGEAL REFLUX DISEASE WITHOUT ESOPHAGITIS: ICD-10-CM
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
## 2017-07-07 DIAGNOSIS — G81.94 HEMIPLEGIA, UNSPECIFIED AFFECTING LEFT NONDOMINANT SIDE: ICD-10-CM

## 2017-07-07 DIAGNOSIS — E11.40 TYPE 2 DIABETES MELLITUS WITH DIABETIC NEUROPATHY, UNSPECIFIED: ICD-10-CM
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
## 2017-07-07 DIAGNOSIS — F03.90 UNSPECIFIED DEMENTIA, UNSPECIFIED SEVERITY, WITHOUT BEHAVIORAL DISTURBANCE, PSYCHOTIC DISTURBANCE, MOOD DISTURBANCE, AND ANXIETY: ICD-10-CM

## 2017-07-07 DIAGNOSIS — K59.00 CONSTIPATION, UNSPECIFIED: ICD-10-CM

## 2017-10-09 ENCOUNTER — APPOINTMENT (OUTPATIENT)
Dept: UROLOGY | Facility: CLINIC | Age: 72
End: 2017-10-09
Payer: MEDICARE

## 2017-10-09 VITALS
SYSTOLIC BLOOD PRESSURE: 135 MMHG | TEMPERATURE: 98.6 F | HEART RATE: 73 BPM | HEIGHT: 72 IN | BODY MASS INDEX: 27.09 KG/M2 | DIASTOLIC BLOOD PRESSURE: 80 MMHG | WEIGHT: 200 LBS

## 2017-10-09 DIAGNOSIS — I63.9 CEREBRAL INFARCTION, UNSPECIFIED: ICD-10-CM

## 2017-10-09 DIAGNOSIS — Z78.9 OTHER SPECIFIED HEALTH STATUS: ICD-10-CM

## 2017-10-09 PROCEDURE — 99204 OFFICE O/P NEW MOD 45 MIN: CPT

## 2017-10-19 ENCOUNTER — APPOINTMENT (OUTPATIENT)
Dept: GASTROENTEROLOGY | Facility: CLINIC | Age: 72
End: 2017-10-19
Payer: MEDICARE

## 2017-10-19 VITALS
WEIGHT: 202 LBS | OXYGEN SATURATION: 95 % | BODY MASS INDEX: 27.36 KG/M2 | HEIGHT: 72 IN | TEMPERATURE: 98 F | DIASTOLIC BLOOD PRESSURE: 58 MMHG | SYSTOLIC BLOOD PRESSURE: 132 MMHG | HEART RATE: 70 BPM

## 2017-10-19 PROCEDURE — 99204 OFFICE O/P NEW MOD 45 MIN: CPT

## 2017-10-19 RX ORDER — ENALAPRIL MALEATE 10 MG/1
10 TABLET ORAL
Refills: 0 | Status: ACTIVE | COMMUNITY

## 2017-10-19 RX ORDER — FINASTERIDE 5 MG/1
5 TABLET, FILM COATED ORAL DAILY
Qty: 90 | Refills: 3 | Status: DISCONTINUED | COMMUNITY
Start: 2017-10-09 | End: 2017-10-19

## 2017-10-19 RX ORDER — DOCUSATE SODIUM AND SENNOSIDES 50; 8.6 MG/1; MG/1
8.6-5 TABLET, FILM COATED ORAL
Refills: 0 | Status: DISCONTINUED | COMMUNITY

## 2017-10-19 RX ORDER — FLUOXETINE HYDROCHLORIDE 10 MG/1
10 CAPSULE ORAL 3 TIMES DAILY
Refills: 0 | Status: ACTIVE | COMMUNITY

## 2017-10-19 RX ORDER — VITAMIN E ACETATE 670 MG
5 CAPSULE ORAL AT BEDTIME
Refills: 0 | Status: ACTIVE | COMMUNITY

## 2017-10-19 RX ORDER — GABAPENTIN 300 MG/1
300 CAPSULE ORAL
Refills: 0 | Status: ACTIVE | COMMUNITY

## 2017-10-19 RX ORDER — CICLOPIROX 7.7 MG/G
0.77 GEL TOPICAL
Refills: 0 | Status: ACTIVE | COMMUNITY

## 2017-10-20 LAB
ALBUMIN SERPL ELPH-MCNC: 3.9 G/DL
ALP BLD-CCNC: 91 U/L
ALT SERPL-CCNC: 8 U/L
ANION GAP SERPL CALC-SCNC: 16 MMOL/L
AST SERPL-CCNC: 11 U/L
BASOPHILS # BLD AUTO: 0.04 K/UL
BASOPHILS NFR BLD AUTO: 0.6 %
BILIRUB SERPL-MCNC: 0.2 MG/DL
BUN SERPL-MCNC: 18 MG/DL
CALCIUM SERPL-MCNC: 9.8 MG/DL
CHLORIDE SERPL-SCNC: 98 MMOL/L
CO2 SERPL-SCNC: 23 MMOL/L
CREAT SERPL-MCNC: 1.01 MG/DL
EOSINOPHIL # BLD AUTO: 0.15 K/UL
EOSINOPHIL NFR BLD AUTO: 2.4 %
GLUCOSE SERPL-MCNC: 130 MG/DL
HCT VFR BLD CALC: 31.3 %
HGB BLD-MCNC: 10.4 G/DL
IMM GRANULOCYTES NFR BLD AUTO: 0.2 %
LYMPHOCYTES # BLD AUTO: 1.11 K/UL
LYMPHOCYTES NFR BLD AUTO: 17.4 %
MAN DIFF?: NORMAL
MCHC RBC-ENTMCNC: 29.9 PG
MCHC RBC-ENTMCNC: 33.2 GM/DL
MCV RBC AUTO: 89.9 FL
MONOCYTES # BLD AUTO: 0.5 K/UL
MONOCYTES NFR BLD AUTO: 7.8 %
NEUTROPHILS # BLD AUTO: 4.56 K/UL
NEUTROPHILS NFR BLD AUTO: 71.6 %
PLATELET # BLD AUTO: 313 K/UL
POTASSIUM SERPL-SCNC: 4.1 MMOL/L
PROT SERPL-MCNC: 7.1 G/DL
RBC # BLD: 3.48 M/UL
RBC # FLD: 13.5 %
SODIUM SERPL-SCNC: 137 MMOL/L
TSH SERPL-ACNC: 1.65 UIU/ML
WBC # FLD AUTO: 6.37 K/UL

## 2017-11-06 ENCOUNTER — OUTPATIENT (OUTPATIENT)
Dept: OUTPATIENT SERVICES | Facility: HOSPITAL | Age: 72
LOS: 1 days | End: 2017-11-06
Payer: COMMERCIAL

## 2017-11-06 ENCOUNTER — RESULT REVIEW (OUTPATIENT)
Age: 72
End: 2017-11-06

## 2017-11-06 DIAGNOSIS — K22.70 BARRETT'S ESOPHAGUS WITHOUT DYSPLASIA: ICD-10-CM

## 2017-11-06 PROCEDURE — 43239 EGD BIOPSY SINGLE/MULTIPLE: CPT

## 2017-11-06 PROCEDURE — 88305 TISSUE EXAM BY PATHOLOGIST: CPT | Mod: 26

## 2017-11-06 PROCEDURE — 82962 GLUCOSE BLOOD TEST: CPT

## 2017-11-06 PROCEDURE — 88312 SPECIAL STAINS GROUP 1: CPT | Mod: 26

## 2017-11-28 ENCOUNTER — APPOINTMENT (OUTPATIENT)
Dept: GASTROENTEROLOGY | Facility: CLINIC | Age: 72
End: 2017-11-28
Payer: MEDICARE

## 2017-11-28 VITALS
RESPIRATION RATE: 16 BRPM | TEMPERATURE: 98.1 F | BODY MASS INDEX: 27.36 KG/M2 | DIASTOLIC BLOOD PRESSURE: 72 MMHG | SYSTOLIC BLOOD PRESSURE: 152 MMHG | HEIGHT: 72 IN | HEART RATE: 72 BPM | WEIGHT: 202 LBS

## 2017-11-28 PROCEDURE — 99214 OFFICE O/P EST MOD 30 MIN: CPT

## 2017-11-28 RX ORDER — LUBIPROSTONE 24 UG/1
24 CAPSULE, GELATIN COATED ORAL
Refills: 0 | Status: DISCONTINUED | COMMUNITY
End: 2017-11-28

## 2017-11-28 RX ORDER — POLYETHYLENE GLYCOL 3350 17 G/17G
17 POWDER, FOR SOLUTION ORAL TWICE DAILY
Qty: 238 | Refills: 0 | Status: ACTIVE | COMMUNITY
Start: 2017-11-28 | End: 1900-01-01

## 2017-11-30 ENCOUNTER — APPOINTMENT (OUTPATIENT)
Dept: UROLOGY | Facility: CLINIC | Age: 72
End: 2017-11-30
Payer: MEDICARE

## 2017-11-30 PROCEDURE — 51798 US URINE CAPACITY MEASURE: CPT

## 2017-11-30 PROCEDURE — 99214 OFFICE O/P EST MOD 30 MIN: CPT

## 2018-02-01 ENCOUNTER — OUTPATIENT (OUTPATIENT)
Dept: OUTPATIENT SERVICES | Facility: HOSPITAL | Age: 73
LOS: 1 days | Discharge: HOME | End: 2018-02-01

## 2018-02-02 DIAGNOSIS — I69.398 OTHER SEQUELAE OF CEREBRAL INFARCTION: ICD-10-CM

## 2018-03-05 ENCOUNTER — RESULT REVIEW (OUTPATIENT)
Age: 73
End: 2018-03-05

## 2018-03-05 ENCOUNTER — OUTPATIENT (OUTPATIENT)
Dept: OUTPATIENT SERVICES | Facility: HOSPITAL | Age: 73
LOS: 1 days | End: 2018-03-05
Payer: COMMERCIAL

## 2018-03-05 DIAGNOSIS — K59.00 CONSTIPATION, UNSPECIFIED: ICD-10-CM

## 2018-03-05 DIAGNOSIS — D64.9 ANEMIA, UNSPECIFIED: ICD-10-CM

## 2018-03-05 PROCEDURE — 88305 TISSUE EXAM BY PATHOLOGIST: CPT | Mod: 26

## 2018-03-05 PROCEDURE — 45380 COLONOSCOPY AND BIOPSY: CPT

## 2018-03-05 PROCEDURE — 88305 TISSUE EXAM BY PATHOLOGIST: CPT

## 2018-03-05 PROCEDURE — 82962 GLUCOSE BLOOD TEST: CPT

## 2018-03-15 ENCOUNTER — APPOINTMENT (OUTPATIENT)
Dept: UROLOGY | Facility: CLINIC | Age: 73
End: 2018-03-15
Payer: MEDICARE

## 2018-03-15 PROCEDURE — 99214 OFFICE O/P EST MOD 30 MIN: CPT

## 2018-04-17 ENCOUNTER — INPATIENT (INPATIENT)
Facility: HOSPITAL | Age: 73
LOS: 1 days | Discharge: HOME | DRG: 204 | End: 2018-04-19
Attending: STUDENT IN AN ORGANIZED HEALTH CARE EDUCATION/TRAINING PROGRAM | Admitting: STUDENT IN AN ORGANIZED HEALTH CARE EDUCATION/TRAINING PROGRAM
Payer: COMMERCIAL

## 2018-04-17 VITALS
DIASTOLIC BLOOD PRESSURE: 63 MMHG | OXYGEN SATURATION: 97 % | TEMPERATURE: 99 F | HEART RATE: 75 BPM | RESPIRATION RATE: 18 BRPM | HEIGHT: 72 IN | SYSTOLIC BLOOD PRESSURE: 125 MMHG | WEIGHT: 199.96 LBS

## 2018-04-17 DIAGNOSIS — R06.02 SHORTNESS OF BREATH: ICD-10-CM

## 2018-04-17 DIAGNOSIS — N40.0 BENIGN PROSTATIC HYPERPLASIA WITHOUT LOWER URINARY TRACT SYMPTOMS: ICD-10-CM

## 2018-04-17 DIAGNOSIS — Z29.9 ENCOUNTER FOR PROPHYLACTIC MEASURES, UNSPECIFIED: ICD-10-CM

## 2018-04-17 DIAGNOSIS — D64.9 ANEMIA, UNSPECIFIED: ICD-10-CM

## 2018-04-17 DIAGNOSIS — I63.9 CEREBRAL INFARCTION, UNSPECIFIED: ICD-10-CM

## 2018-04-17 DIAGNOSIS — I10 ESSENTIAL (PRIMARY) HYPERTENSION: ICD-10-CM

## 2018-04-17 DIAGNOSIS — F32.9 MAJOR DEPRESSIVE DISORDER, SINGLE EPISODE, UNSPECIFIED: ICD-10-CM

## 2018-04-17 DIAGNOSIS — E78.5 HYPERLIPIDEMIA, UNSPECIFIED: ICD-10-CM

## 2018-04-17 DIAGNOSIS — E11.9 TYPE 2 DIABETES MELLITUS WITHOUT COMPLICATIONS: ICD-10-CM

## 2018-04-17 LAB
ALBUMIN SERPL ELPH-MCNC: 3.3 G/DL — LOW (ref 3.5–5)
ALP SERPL-CCNC: 78 U/L — SIGNIFICANT CHANGE UP (ref 40–120)
ALT FLD-CCNC: 17 U/L DA — SIGNIFICANT CHANGE UP (ref 10–60)
ANION GAP SERPL CALC-SCNC: 7 MMOL/L — SIGNIFICANT CHANGE UP (ref 5–17)
APPEARANCE UR: CLEAR — SIGNIFICANT CHANGE UP
APTT BLD: 30.7 SEC — SIGNIFICANT CHANGE UP (ref 27.5–37.4)
AST SERPL-CCNC: 12 U/L — SIGNIFICANT CHANGE UP (ref 10–40)
BASOPHILS # BLD AUTO: 0.1 K/UL — SIGNIFICANT CHANGE UP (ref 0–0.2)
BASOPHILS NFR BLD AUTO: 1.4 % — SIGNIFICANT CHANGE UP (ref 0–2)
BILIRUB SERPL-MCNC: 0.2 MG/DL — SIGNIFICANT CHANGE UP (ref 0.2–1.2)
BILIRUB UR-MCNC: ABNORMAL
BUN SERPL-MCNC: 18 MG/DL — SIGNIFICANT CHANGE UP (ref 7–18)
CALCIUM SERPL-MCNC: 8.8 MG/DL — SIGNIFICANT CHANGE UP (ref 8.4–10.5)
CHLORIDE SERPL-SCNC: 108 MMOL/L — SIGNIFICANT CHANGE UP (ref 96–108)
CO2 SERPL-SCNC: 27 MMOL/L — SIGNIFICANT CHANGE UP (ref 22–31)
COLOR SPEC: YELLOW — SIGNIFICANT CHANGE UP
CREAT SERPL-MCNC: 0.82 MG/DL — SIGNIFICANT CHANGE UP (ref 0.5–1.3)
DIFF PNL FLD: NEGATIVE — SIGNIFICANT CHANGE UP
EOSINOPHIL # BLD AUTO: 0.2 K/UL — SIGNIFICANT CHANGE UP (ref 0–0.5)
EOSINOPHIL NFR BLD AUTO: 2.7 % — SIGNIFICANT CHANGE UP (ref 0–6)
GLUCOSE SERPL-MCNC: 110 MG/DL — HIGH (ref 70–99)
GLUCOSE UR QL: NEGATIVE — SIGNIFICANT CHANGE UP
HCT VFR BLD CALC: 32.2 % — LOW (ref 39–50)
HGB BLD-MCNC: 10.1 G/DL — LOW (ref 13–17)
INR BLD: 1 RATIO — SIGNIFICANT CHANGE UP (ref 0.88–1.16)
KETONES UR-MCNC: NEGATIVE — SIGNIFICANT CHANGE UP
LEUKOCYTE ESTERASE UR-ACNC: NEGATIVE — SIGNIFICANT CHANGE UP
LYMPHOCYTES # BLD AUTO: 1 K/UL — SIGNIFICANT CHANGE UP (ref 1–3.3)
LYMPHOCYTES # BLD AUTO: 16.4 % — SIGNIFICANT CHANGE UP (ref 13–44)
MCHC RBC-ENTMCNC: 29.9 PG — SIGNIFICANT CHANGE UP (ref 27–34)
MCHC RBC-ENTMCNC: 31.4 GM/DL — LOW (ref 32–36)
MCV RBC AUTO: 95.3 FL — SIGNIFICANT CHANGE UP (ref 80–100)
MONOCYTES # BLD AUTO: 0.5 K/UL — SIGNIFICANT CHANGE UP (ref 0–0.9)
MONOCYTES NFR BLD AUTO: 7.8 % — SIGNIFICANT CHANGE UP (ref 2–14)
NEUTROPHILS # BLD AUTO: 4.2 K/UL — SIGNIFICANT CHANGE UP (ref 1.8–7.4)
NEUTROPHILS NFR BLD AUTO: 71.6 % — SIGNIFICANT CHANGE UP (ref 43–77)
NITRITE UR-MCNC: NEGATIVE — SIGNIFICANT CHANGE UP
NT-PROBNP SERPL-SCNC: 168 PG/ML — HIGH (ref 0–125)
PH UR: 5 — SIGNIFICANT CHANGE UP (ref 5–8)
PLATELET # BLD AUTO: 235 K/UL — SIGNIFICANT CHANGE UP (ref 150–400)
POTASSIUM SERPL-MCNC: 4.1 MMOL/L — SIGNIFICANT CHANGE UP (ref 3.5–5.3)
POTASSIUM SERPL-SCNC: 4.1 MMOL/L — SIGNIFICANT CHANGE UP (ref 3.5–5.3)
PROT SERPL-MCNC: 6.6 G/DL — SIGNIFICANT CHANGE UP (ref 6–8.3)
PROT UR-MCNC: 100
PROTHROM AB SERPL-ACNC: 10.9 SEC — SIGNIFICANT CHANGE UP (ref 9.8–12.7)
RAPID RVP RESULT: SIGNIFICANT CHANGE UP
RBC # BLD: 3.38 M/UL — LOW (ref 4.2–5.8)
RBC # FLD: 13.1 % — SIGNIFICANT CHANGE UP (ref 10.3–14.5)
SODIUM SERPL-SCNC: 142 MMOL/L — SIGNIFICANT CHANGE UP (ref 135–145)
SP GR SPEC: 1.02 — SIGNIFICANT CHANGE UP (ref 1.01–1.02)
TROPONIN I SERPL-MCNC: <0.015 NG/ML — SIGNIFICANT CHANGE UP (ref 0–0.04)
TROPONIN I SERPL-MCNC: <0.015 NG/ML — SIGNIFICANT CHANGE UP (ref 0–0.04)
UROBILINOGEN FLD QL: 1
WBC # BLD: 5.9 K/UL — SIGNIFICANT CHANGE UP (ref 3.8–10.5)
WBC # FLD AUTO: 5.9 K/UL — SIGNIFICANT CHANGE UP (ref 3.8–10.5)

## 2018-04-17 PROCEDURE — 71045 X-RAY EXAM CHEST 1 VIEW: CPT | Mod: 26

## 2018-04-17 PROCEDURE — 70450 CT HEAD/BRAIN W/O DYE: CPT | Mod: 26

## 2018-04-17 PROCEDURE — 99285 EMERGENCY DEPT VISIT HI MDM: CPT

## 2018-04-17 PROCEDURE — 71275 CT ANGIOGRAPHY CHEST: CPT | Mod: 26

## 2018-04-17 RX ORDER — CLOPIDOGREL BISULFATE 75 MG/1
75 TABLET, FILM COATED ORAL DAILY
Qty: 0 | Refills: 0 | Status: DISCONTINUED | OUTPATIENT
Start: 2018-04-17 | End: 2018-04-19

## 2018-04-17 RX ORDER — PANTOPRAZOLE SODIUM 20 MG/1
40 TABLET, DELAYED RELEASE ORAL
Qty: 0 | Refills: 0 | Status: DISCONTINUED | OUTPATIENT
Start: 2018-04-17 | End: 2018-04-19

## 2018-04-17 RX ORDER — IPRATROPIUM/ALBUTEROL SULFATE 18-103MCG
3 AEROSOL WITH ADAPTER (GRAM) INHALATION EVERY 6 HOURS
Qty: 0 | Refills: 0 | Status: DISCONTINUED | OUTPATIENT
Start: 2018-04-17 | End: 2018-04-19

## 2018-04-17 RX ORDER — ENOXAPARIN SODIUM 100 MG/ML
40 INJECTION SUBCUTANEOUS
Qty: 0 | Refills: 0 | Status: DISCONTINUED | OUTPATIENT
Start: 2018-04-17 | End: 2018-04-17

## 2018-04-17 RX ORDER — INSULIN LISPRO 100/ML
VIAL (ML) SUBCUTANEOUS
Qty: 0 | Refills: 0 | Status: DISCONTINUED | OUTPATIENT
Start: 2018-04-17 | End: 2018-04-19

## 2018-04-17 RX ORDER — AMLODIPINE BESYLATE 2.5 MG/1
10 TABLET ORAL DAILY
Qty: 0 | Refills: 0 | Status: DISCONTINUED | OUTPATIENT
Start: 2018-04-17 | End: 2018-04-19

## 2018-04-17 RX ORDER — TAMSULOSIN HYDROCHLORIDE 0.4 MG/1
0.4 CAPSULE ORAL AT BEDTIME
Qty: 0 | Refills: 0 | Status: DISCONTINUED | OUTPATIENT
Start: 2018-04-17 | End: 2018-04-19

## 2018-04-17 RX ORDER — FINASTERIDE 5 MG/1
5 TABLET, FILM COATED ORAL DAILY
Qty: 0 | Refills: 0 | Status: DISCONTINUED | OUTPATIENT
Start: 2018-04-17 | End: 2018-04-19

## 2018-04-17 RX ORDER — ATORVASTATIN CALCIUM 80 MG/1
40 TABLET, FILM COATED ORAL AT BEDTIME
Qty: 0 | Refills: 0 | Status: DISCONTINUED | OUTPATIENT
Start: 2018-04-17 | End: 2018-04-19

## 2018-04-17 RX ORDER — ENOXAPARIN SODIUM 100 MG/ML
40 INJECTION SUBCUTANEOUS DAILY
Qty: 0 | Refills: 0 | Status: DISCONTINUED | OUTPATIENT
Start: 2018-04-17 | End: 2018-04-19

## 2018-04-17 RX ORDER — FLUOXETINE HCL 10 MG
10 CAPSULE ORAL DAILY
Qty: 0 | Refills: 0 | Status: DISCONTINUED | OUTPATIENT
Start: 2018-04-17 | End: 2018-04-19

## 2018-04-17 RX ORDER — DONEPEZIL HYDROCHLORIDE 10 MG/1
10 TABLET, FILM COATED ORAL AT BEDTIME
Qty: 0 | Refills: 0 | Status: DISCONTINUED | OUTPATIENT
Start: 2018-04-17 | End: 2018-04-19

## 2018-04-17 RX ORDER — ASPIRIN/CALCIUM CARB/MAGNESIUM 324 MG
81 TABLET ORAL DAILY
Qty: 0 | Refills: 0 | Status: DISCONTINUED | OUTPATIENT
Start: 2018-04-17 | End: 2018-04-19

## 2018-04-17 RX ORDER — GABAPENTIN 400 MG/1
300 CAPSULE ORAL DAILY
Qty: 0 | Refills: 0 | Status: DISCONTINUED | OUTPATIENT
Start: 2018-04-17 | End: 2018-04-19

## 2018-04-17 RX ADMIN — DONEPEZIL HYDROCHLORIDE 10 MILLIGRAM(S): 10 TABLET, FILM COATED ORAL at 22:23

## 2018-04-17 RX ADMIN — ATORVASTATIN CALCIUM 40 MILLIGRAM(S): 80 TABLET, FILM COATED ORAL at 22:23

## 2018-04-17 RX ADMIN — Medication 10 MILLIGRAM(S): at 22:23

## 2018-04-17 RX ADMIN — TAMSULOSIN HYDROCHLORIDE 0.4 MILLIGRAM(S): 0.4 CAPSULE ORAL at 22:23

## 2018-04-17 NOTE — H&P ADULT - PROBLEM SELECTOR PLAN 4
Patient takes flomax and finasteride at home  c/w home medications F/u lipid profile  c/w statin C/w accuchecks, hss, sugars controlled   f/u HbA1C  HOlding home dose oral metformin and januvia

## 2018-04-17 NOTE — ED PROVIDER NOTE - PROGRESS NOTE DETAILS
GAL He: Patient was endorsed to me by Dr. Stern at the usual hour of signout to follow up results of ct and dispo pending these results and re-evaluation. At this time the patient still symptomatic. dw pt wife and agreed to admit pt for further eval of the cause of his symptoms.

## 2018-04-17 NOTE — H&P ADULT - PROBLEM SELECTOR PLAN 7
RISK                                                          Points  [  ] Previous VTE                                                3  [  ] Thrombophilia                                             2  [  ] Lower limb paralysis                                   2        (unable to hold up >15 seconds)    [  ] Current Cancer                                             2         (within 6 months)  [ x ] Immobilization > 24 hrs                              1  [  ] ICU/CCU stay > 24 hours                             1  [ x ] Age > 60                                                         1    IMPROVE VTE Score: 2  lovenox for VTE prophylaxis. hx of previous CVA, c/w aspirin, plavix and lipitor Patient takes fluxotine, aricept   c/w medications

## 2018-04-17 NOTE — ED ADULT TRIAGE NOTE - CHIEF COMPLAINT QUOTE
BIBA as per wife pt is more lethargic, AMS, p/w diff breathing, was scheduled for CT tomorrow for suspicious x-ray of lungs. PT is axox2 on arrival, legally blind, sating 97%RA, . LKN last night @10:30pm

## 2018-04-17 NOTE — ED PROVIDER NOTE - MEDICAL DECISION MAKING DETAILS
71 y/o M pt presents with difficulty breathing and increased lethargy. Pt with Hx of CVA and recent CXR showing "something suspicious". Will start with CXR, labs including cardiac enzymes, UA, EKG, possible CT Chest, and will reassess.

## 2018-04-17 NOTE — H&P ADULT - PMH
BPH (benign prostatic hyperplasia)    CVA (cerebral vascular accident)    Depression    Diabetes    Hyperlipidemia    Legally blind

## 2018-04-17 NOTE — H&P ADULT - PROBLEM SELECTOR PLAN 2
C/w accuchecks, hss, sugars controlled   f/u HbA1C  HOlding home dose oral metformin and januvia BP stable   continue with home dose of amlodipine and enalapril

## 2018-04-17 NOTE — H&P ADULT - PROBLEM SELECTOR PLAN 3
F/u lipid profile  c/w statin C/w accuchecks, hss, sugars controlled   f/u HbA1C  HOlding home dose oral metformin and januvia Patient Hemoglobin 10 baseline unknown , normocytic anemia  F/u anemia panel

## 2018-04-17 NOTE — H&P ADULT - PROBLEM SELECTOR PLAN 6
hx of previous CVA, c/w aspirin, plavix and lipitor Patient takes fluxotine, aricept   c/w medications Patient takes flomax and finasteride at home  c/w home medications

## 2018-04-17 NOTE — ED ADULT NURSE NOTE - OBJECTIVE STATEMENT
72yr old m complained of difficulty breathing since this morning, pt wife stated pt has x-ray appointment tomorrow fro suspicious lung nodule, pt has hx of 3 CVA, left sided weakness, pt needs assist to ambulate, skin is intact, pt safety will be maintain

## 2018-04-17 NOTE — H&P ADULT - ASSESSMENT
72 yr old M from home, bedbound, legally blind, former smoker (quit 20 yrs ago) with PMH of CVA (x3), DM, Htn, HLD, BPH, Depression, BPH? came with complain of Shortness of breath x 3 weeks  In ED, Patient's vital sign were stable, RVP was negative, EKG NSR, No ST-T wave changes. CT angio was done showed" Limited evaluation for segmental and subsegmental pulmonary   embolism due to respiratory motion. No main, left, right, or lobar artery pulmonary embolism., CXR: shows No consolidation or pleural effusion. Heart size within normal limits."    Patient is admitted to telemetry for concerns of CHF/ new onset asthma

## 2018-04-17 NOTE — H&P ADULT - PROBLEM SELECTOR PLAN 5
Patient takes fluxotine, aricept   c/w medications Patient takes flomax and finasteride at home  c/w home medications F/u lipid profile  c/w statin

## 2018-04-17 NOTE — ED PROVIDER NOTE - OBJECTIVE STATEMENT
71 y/o M pt with PMHx of CVA (x3), DM, and Legally Blind and no significant PSHx BIBA with wife to ED with intermittent, worsening difficulty breathing x2 weeks that severely worsened today. EMS reports pt with FS of 146 and O2Sat of 97% en route to ED. Per pt's wife, pt had a CXR performed on 04/10/2018, which "showed something suspicious"; pt is scheduled to receive a CT Chest tomorrow for further imaging. Wife notes pt with increased lethargy as well. Wife denies pt b/l leg swelling, increased/worsened confusion, or any other complaints. NKDA.

## 2018-04-17 NOTE — H&P ADULT - NSHPSOCIALHISTORY_GEN_ALL_CORE
Lives at home, former smoker, smokes marijuana many years ago. But denies cocaine, illicit drug, [employer]co[employer]

## 2018-04-17 NOTE — H&P ADULT - HISTORY OF PRESENT ILLNESS
72 yr old M from home, bedbound, legally blind, with PMH of CVA (x3), DM, Htn, HLD, BPH, Depression, BPH? came with complain of Shortness of breath x 3 weeks. Patient and his wife at bedside giving history states he has intermittent dysnea since 3 weeks, especially in morning, started gradually, aggravated when he sits up on bed and when he is anxious. Dyspnea is associated with cough and scant sputum ( milky white in colour) . Patient went to Primary medical doctor today who did CXR which showed suspicious lesion and that's why was sent to hospital. Patient denies 72 yr old M from home, bedbound, legally blind, former smoker (quit 20 yrs ago) with PMH of CVA (x3), DM, Htn, HLD, BPH, Depression, BPH? came with complain of Shortness of breath x 3 weeks. Patient and his wife at bedside giving history states he has intermittent dysnea since 3 weeks, especially in morning, started gradually, aggravated when he sits up on bed and when he is anxious. Dyspnea is associated with cough and scant sputum ( milky white in colour) . Patient went to Primary medical doctor today who did CXR which showed suspicious lesion and that's why was sent to hospital. Patient denies fever, chills, chest pain, nausea, vomiting, diarrhea or any other complains    In ED, Patient's vital sign were stable, RVP was negative, EKG NSR, No ST-T wave changes. CT angio was done showed" Limited evaluation for segmental and subsegmental pulmonary   embolism due to respiratory motion. No main, left, right, or lobar artery pulmonary embolism., CXR: shows No consolidation or pleural effusion. Heart size within normal limits." When patient was seen by me denies any complains.

## 2018-04-17 NOTE — H&P ADULT - ATTENDING COMMENTS
Agree with above   Patient was seen and examined in ED yesterday evening.   He is a 72 yr old M from home, bedbound, legally blind, former smoker (quit 20 yrs ago) with PMH of CVA (x3), DM, Htn, HLD, BPH, Depression, BPH? came with complain of Shortness of breath x 3 weeks. Patient and his wife at bedside giving history states he has intermittent dysnea since 3 weeks, especially in morning, started gradually, aggravated when he sits up on bed and when he is anxious. Dyspnea is associated with cough and scant sputum ( milky white in colour) . Patient went to Primary medical doctor today who did CXR which showed suspicious lesion and that's why was sent to hospital. Patient denies fever, chills, chest pain, nausea, vomiting, diarrhea or any other complains    ROS and PE as above   Vital Signs Last 24 Hrs  T(C): 36.8 (18 Apr 2018 07:26), Max: 37 (17 Apr 2018 11:36)  T(F): 98.2 (18 Apr 2018 07:26), Max: 98.6 (17 Apr 2018 11:36)  HR: 74 (18 Apr 2018 07:26) (67 - 79)  BP: 144/71 (18 Apr 2018 07:26) (125/63 - 171/80)  BP(mean): --  RR: 16 (18 Apr 2018 07:26) (16 - 18)  SpO2: 98% (18 Apr 2018 07:26) (95% - 100%)    1. Episodic dyspnea on rest and on exertion: No prior history of cardiac or lung diseases. Patient with high risk for PE. Anxiety can not be excluded   CXR: no consolidations or effusions   CTA of lung: not complete: Limited evaluation for segmental and subsegmental pulmonary embolism due to respiratory motion. No main, left, right, or lobar artery pulmonary embolism.  Get V/Q scan to rule out chronic PE and TTE   CEx2 negative, EKG: NSR  c/w Aspirin and Plavix for now   Cardiology consult     2. Normocytic anemia   3. DM: f/u HbA1C, hold home meds for now, c/w Humalog per sliding scale for now   4. HTN: BP is not optimaly controlled: at home on Enalapril 10 and Amlodipine 10. Can increase Enalapril to 20 mg po daily if SBP consistently >160.     The rest as above  Plan of care discussed with patient and his wife at bedside

## 2018-04-17 NOTE — H&P ADULT - NSHPPHYSICALEXAM_GEN_ALL_CORE
PHYSICAL EXAM:  GENERAL: NAD,   HEAD:  Atraumatic, Normocephalic  EYES:  conjunctiva and sclera clear  NECK: Supple, No JVD, Normal thyroid  CHEST/LUNG: B/l lower lobe wheezing,  Clear to percussion bilaterally; No rales, rhonchi, or rubs  HEART: Regular rate and rhythm; No murmurs, rubs, or gallops  ABDOMEN: Soft, Nontender, Nondistended; Bowel sounds present  NERVOUS SYSTEM:  Alert & Oriented X3,    EXTREMITIES:  2+ Peripheral Pulses, No clubbing, cyanosis, or edema  SKIN: warm dry

## 2018-04-17 NOTE — H&P ADULT - PROBLEM SELECTOR PLAN 1
Patient presented with Dyspnea x 3 weeks, PE shows B/l Wheezing, could be due to new onset heart failure although BNP is 168, asthma or could be ACS  CT angio: shows no No main, left, right, or lobar artery pulmonary embolism., no pneumonia, CXR normal  EKG: NSR, NO S-T wave changes  Cardiac enzymes x 2 negative  c/w aspirin, statin, plavix  f/u Echo  c/w telemonitoring  Cardio Dr. Momin

## 2018-04-17 NOTE — H&P ADULT - PROBLEM SELECTOR PLAN 8
RISK                                                          Points  [  ] Previous VTE                                                3  [  ] Thrombophilia                                             2  [  ] Lower limb paralysis                                   2        (unable to hold up >15 seconds)    [  ] Current Cancer                                             2         (within 6 months)  [ x ] Immobilization > 24 hrs                              1  [  ] ICU/CCU stay > 24 hours                             1  [ x ] Age > 60                                                         1    IMPROVE VTE Score: 2  lovenox for VTE prophylaxis. hx of previous CVA, c/w aspirin, plavix and lipitor

## 2018-04-18 LAB
ANION GAP SERPL CALC-SCNC: 8 MMOL/L — SIGNIFICANT CHANGE UP (ref 5–17)
BASOPHILS # BLD AUTO: 0.1 K/UL — SIGNIFICANT CHANGE UP (ref 0–0.2)
BASOPHILS NFR BLD AUTO: 1.1 % — SIGNIFICANT CHANGE UP (ref 0–2)
BUN SERPL-MCNC: 16 MG/DL — SIGNIFICANT CHANGE UP (ref 7–18)
CALCIUM SERPL-MCNC: 9 MG/DL — SIGNIFICANT CHANGE UP (ref 8.4–10.5)
CHLORIDE SERPL-SCNC: 107 MMOL/L — SIGNIFICANT CHANGE UP (ref 96–108)
CHOLEST SERPL-MCNC: 122 MG/DL — SIGNIFICANT CHANGE UP (ref 10–199)
CK MB BLD-MCNC: 1.3 % — SIGNIFICANT CHANGE UP (ref 0–3.5)
CK MB CFR SERPL CALC: 1.4 NG/ML — SIGNIFICANT CHANGE UP (ref 0–3.6)
CK SERPL-CCNC: 110 U/L — SIGNIFICANT CHANGE UP (ref 35–232)
CO2 SERPL-SCNC: 28 MMOL/L — SIGNIFICANT CHANGE UP (ref 22–31)
CREAT SERPL-MCNC: 0.78 MG/DL — SIGNIFICANT CHANGE UP (ref 0.5–1.3)
CULTURE RESULTS: SIGNIFICANT CHANGE UP
EOSINOPHIL # BLD AUTO: 0.2 K/UL — SIGNIFICANT CHANGE UP (ref 0–0.5)
EOSINOPHIL NFR BLD AUTO: 3.3 % — SIGNIFICANT CHANGE UP (ref 0–6)
FERRITIN SERPL-MCNC: 35 NG/ML — SIGNIFICANT CHANGE UP (ref 30–400)
GLUCOSE SERPL-MCNC: 119 MG/DL — HIGH (ref 70–99)
HBA1C BLD-MCNC: 6.2 % — HIGH (ref 4–5.6)
HCT VFR BLD CALC: 32.8 % — LOW (ref 39–50)
HDLC SERPL-MCNC: 58 MG/DL — SIGNIFICANT CHANGE UP (ref 40–125)
HGB BLD-MCNC: 10.8 G/DL — LOW (ref 13–17)
IRON SATN MFR SERPL: 24 % — SIGNIFICANT CHANGE UP (ref 20–55)
IRON SATN MFR SERPL: 66 UG/DL — SIGNIFICANT CHANGE UP (ref 65–170)
LDH SERPL L TO P-CCNC: 168 U/L — SIGNIFICANT CHANGE UP (ref 120–225)
LIPID PNL WITH DIRECT LDL SERPL: 47 MG/DL — SIGNIFICANT CHANGE UP
LYMPHOCYTES # BLD AUTO: 1.3 K/UL — SIGNIFICANT CHANGE UP (ref 1–3.3)
LYMPHOCYTES # BLD AUTO: 21 % — SIGNIFICANT CHANGE UP (ref 13–44)
MAGNESIUM SERPL-MCNC: 1.9 MG/DL — SIGNIFICANT CHANGE UP (ref 1.6–2.6)
MCHC RBC-ENTMCNC: 31.3 PG — SIGNIFICANT CHANGE UP (ref 27–34)
MCHC RBC-ENTMCNC: 32.9 GM/DL — SIGNIFICANT CHANGE UP (ref 32–36)
MCV RBC AUTO: 95 FL — SIGNIFICANT CHANGE UP (ref 80–100)
MONOCYTES # BLD AUTO: 0.5 K/UL — SIGNIFICANT CHANGE UP (ref 0–0.9)
MONOCYTES NFR BLD AUTO: 8.4 % — SIGNIFICANT CHANGE UP (ref 2–14)
NEUTROPHILS # BLD AUTO: 4 K/UL — SIGNIFICANT CHANGE UP (ref 1.8–7.4)
NEUTROPHILS NFR BLD AUTO: 66.2 % — SIGNIFICANT CHANGE UP (ref 43–77)
PLATELET # BLD AUTO: 223 K/UL — SIGNIFICANT CHANGE UP (ref 150–400)
POTASSIUM SERPL-MCNC: 3.8 MMOL/L — SIGNIFICANT CHANGE UP (ref 3.5–5.3)
POTASSIUM SERPL-SCNC: 3.8 MMOL/L — SIGNIFICANT CHANGE UP (ref 3.5–5.3)
RBC # BLD: 3.43 M/UL — LOW (ref 4.2–5.8)
RBC # BLD: 3.45 M/UL — LOW (ref 4.2–5.8)
RBC # FLD: 13.2 % — SIGNIFICANT CHANGE UP (ref 10.3–14.5)
RETICS #: 41.2 K/UL — SIGNIFICANT CHANGE UP (ref 25–125)
RETICS/RBC NFR: 1.2 % — SIGNIFICANT CHANGE UP (ref 0.5–2.5)
SODIUM SERPL-SCNC: 143 MMOL/L — SIGNIFICANT CHANGE UP (ref 135–145)
SPECIMEN SOURCE: SIGNIFICANT CHANGE UP
TIBC SERPL-MCNC: 279 UG/DL — SIGNIFICANT CHANGE UP (ref 250–450)
TOTAL CHOLESTEROL/HDL RATIO MEASUREMENT: 2.1 RATIO — LOW (ref 3.4–9.6)
TRIGL SERPL-MCNC: 83 MG/DL — SIGNIFICANT CHANGE UP (ref 10–149)
TROPONIN I SERPL-MCNC: <0.015 NG/ML — SIGNIFICANT CHANGE UP (ref 0–0.04)
TSH SERPL-MCNC: 1.17 UU/ML — SIGNIFICANT CHANGE UP (ref 0.34–4.82)
UIBC SERPL-MCNC: 213 UG/DL — SIGNIFICANT CHANGE UP (ref 110–370)
VIT B12 SERPL-MCNC: 800 PG/ML — SIGNIFICANT CHANGE UP (ref 232–1245)
WBC # BLD: 6 K/UL — SIGNIFICANT CHANGE UP (ref 3.8–10.5)
WBC # FLD AUTO: 6 K/UL — SIGNIFICANT CHANGE UP (ref 3.8–10.5)

## 2018-04-18 PROCEDURE — 99222 1ST HOSP IP/OBS MODERATE 55: CPT

## 2018-04-18 PROCEDURE — 99223 1ST HOSP IP/OBS HIGH 75: CPT | Mod: GC

## 2018-04-18 RX ADMIN — PANTOPRAZOLE SODIUM 40 MILLIGRAM(S): 20 TABLET, DELAYED RELEASE ORAL at 08:10

## 2018-04-18 RX ADMIN — TAMSULOSIN HYDROCHLORIDE 0.4 MILLIGRAM(S): 0.4 CAPSULE ORAL at 21:24

## 2018-04-18 RX ADMIN — ENOXAPARIN SODIUM 40 MILLIGRAM(S): 100 INJECTION SUBCUTANEOUS at 11:47

## 2018-04-18 RX ADMIN — Medication 81 MILLIGRAM(S): at 11:46

## 2018-04-18 RX ADMIN — GABAPENTIN 300 MILLIGRAM(S): 400 CAPSULE ORAL at 11:47

## 2018-04-18 RX ADMIN — ATORVASTATIN CALCIUM 40 MILLIGRAM(S): 80 TABLET, FILM COATED ORAL at 21:24

## 2018-04-18 RX ADMIN — Medication 1: at 11:45

## 2018-04-18 RX ADMIN — CLOPIDOGREL BISULFATE 75 MILLIGRAM(S): 75 TABLET, FILM COATED ORAL at 11:46

## 2018-04-18 RX ADMIN — AMLODIPINE BESYLATE 10 MILLIGRAM(S): 2.5 TABLET ORAL at 06:16

## 2018-04-18 RX ADMIN — Medication 10 MILLIGRAM(S): at 11:47

## 2018-04-18 RX ADMIN — DONEPEZIL HYDROCHLORIDE 10 MILLIGRAM(S): 10 TABLET, FILM COATED ORAL at 21:24

## 2018-04-18 RX ADMIN — Medication 10 MILLIGRAM(S): at 06:16

## 2018-04-18 RX ADMIN — FINASTERIDE 5 MILLIGRAM(S): 5 TABLET, FILM COATED ORAL at 11:47

## 2018-04-18 NOTE — PROGRESS NOTE ADULT - PROBLEM SELECTOR PLAN 4
-C/w accuchecks, hss, sugars controlled   -HbA1C- 6.2  -Holding home dose oral metformin and januvia- resume upon dc

## 2018-04-18 NOTE — CONSULT NOTE ADULT - ASSESSMENT
71 yo M with noted PMH including HTN, DM, and CVA who presents with dyspnea of uncertain etiology.     1. Dyspnea: Unlikely to be ischemic in the setting of negative cardiac enzymes x3, as well as absence of significant EKG changes.   -Likewise, heart failure is essentially ruled out with a pro-BNP of 168, as well as absence of volume overload on exam and radiologic studies  -No significant PE noted on CT angio  -Consider systemic etiology such as patient's anemia, though hemoglobin of 10 does not seem like ti would cause significant symptoms    Patient with mild AS on echo from 06/2017, will see if this progressed on repeat echo    2. HTN: Patient is on amlodipine 10, enalapril 10, and tamsulosin.   -His BP initially was high, but has started to trend down to the 120s-130s systolic on current regimen    3. CVA: Patient is on aspirin 325 and clopidogrel presumably for his CVA.   -There is no evidence that higher doses of aspirin are more efficacious in secondary stroke prevention; would therefore change his aspirin to 81mg daily  -Can continue statin 71 yo M with noted PMH including HTN, DM, and CVA who presents with dyspnea of uncertain etiology.     1. Dyspnea: Unlikely to be ischemic in the setting of negative cardiac enzymes x3, as well as absence of significant EKG changes.   -Likewise, heart failure is essentially ruled out with a pro-BNP of 168, as well as absence of volume overload on exam and radiologic studies  -No significant PE noted on CT angio  -Consider systemic etiology such as patient's anemia, though hemoglobin of 10 does not seem like ti would cause significant symptoms    Patient with mild AS on echo from 06/2017, will see if this progressed on repeat echo  -Discontinue telemetry monitoring    2. HTN: Patient is on amlodipine 10, enalapril 10, and tamsulosin.   -His BP initially was high, but has started to trend down to the 120s-130s systolic on current regimen    3. CVA: Patient is on aspirin 325 and clopidogrel presumably for his CVA.   -There is no evidence that higher doses of aspirin are more efficacious in secondary stroke prevention; would therefore change his aspirin to 81mg daily  -Can continue statin

## 2018-04-18 NOTE — PROGRESS NOTE ADULT - PROBLEM SELECTOR PLAN 1
-Patient presented with Dyspnea x 3 weeks w/  ronchorus breath sounds  -CT angio: shows no No main, left, right, or lobar artery pulmonary embolism., no pneumonia.  Negative for any chronic infiltrates or suggestion of ILD  -CXR normal  -EKG: NSR, NO S-T wave changes  -Cardiac enzymes x 2 negative  -May be 2/2 chronic resp disease- Asthma  -c/w albuterol therapy for now per pulm  -f/u Echo  -c/w telemonitoring  -Cardio Dr. Momin  -Pulm: Dr. Desai

## 2018-04-18 NOTE — CONSULT NOTE ADULT - SUBJECTIVE AND OBJECTIVE BOX
CHIEF COMPLAINT: Shortness of breath    HPI: 71 yo M with DM, HTN, bedbound after CVA, who presents with dyspnea x 3 weeks. Patient reports    PAST MEDICAL & SURGICAL HISTORY:  As above, also BPH (benign prostatic hyperplasia), Depression  No significant past surgical history      Allergies    No Known Allergies    MEDICATIONS  (STANDING):  amLODIPine   Tablet 10 milliGRAM(s) Oral daily  aspirin  chewable 81 milliGRAM(s) Oral daily  atorvastatin 40 milliGRAM(s) Oral at bedtime  clopidogrel Tablet 75 milliGRAM(s) Oral daily  donepezil 10 milliGRAM(s) Oral at bedtime  enalapril 10 milliGRAM(s) Oral daily  enoxaparin Injectable 40 milliGRAM(s) SubCutaneous daily  finasteride 5 milliGRAM(s) Oral daily  FLUoxetine 10 milliGRAM(s) Oral daily  gabapentin 300 milliGRAM(s) Oral daily  insulin lispro (HumaLOG) corrective regimen sliding scale   SubCutaneous three times a day before meals  pantoprazole    Tablet 40 milliGRAM(s) Oral before breakfast  tamsulosin 0.4 milliGRAM(s) Oral at bedtime    MEDICATIONS  (PRN):  ALBUTerol/ipratropium for Nebulization 3 milliLiter(s) Nebulizer every 6 hours PRN Shortness of Breath      FAMILY HISTORY:  No pertinent family history in first degree relatives    No family history of premature coronary artery disease or sudden cardiac death    SOCIAL HISTORY:  Smoking-  Alcohol-  Illicit Drug use-    REVIEW OF SYSTEMS:  Constitutional: [ ] fever, [ ]weight loss,  [ ]fatigue  Eyes: [ ] visual changes  Respiratory: [ ]shortness of breath;  [ ] cough, [ ]wheezing, [ ]chills, [ ]hemoptysis  Cardiovascular: [ ] chest pain, [ ]palpitations, [ ]dizziness,  [ ]leg swelling  Gastrointestinal: [ ] abdominal pain, [ ]nausea, [ ]vomiting,  [ ]diarrhea   Genitourinary: [ ] dysuria, [ ] hematuria  Neurologic: [ ] headaches [ ] tremors  [ ] weakness [ ] lightheadedness  Skin: [ ] itching, [ ]burning, [ ] rashes  Endocrine: [ ] heat or cold intolerance  Musculoskeletal: [ ] joint pain or swelling; [ ] muscle, back, or extremity pain  Psychiatric: [ ] depression, [ ]anxiety, [ ]mood swings, or [ ]difficulty sleeping  Hematologic: [ ] easy bruising, [ ] bleeding gums       [ x] All others negative	  [ ] Unable to obtain    Vital Signs Last 24 Hrs  T(C): 36.7 (18 Apr 2018 19:27), Max: 36.9 (18 Apr 2018 04:51)  T(F): 98 (18 Apr 2018 19:27), Max: 98.5 (18 Apr 2018 04:51)  HR: 71 (18 Apr 2018 19:27) (67 - 79)  BP: 138/75 (18 Apr 2018 19:27) (119/58 - 167/72)  BP(mean): --  RR: 16 (18 Apr 2018 19:27) (16 - 16)  SpO2: 100% (18 Apr 2018 19:27) (96% - 100%)  I&O's Summary    18 Apr 2018 07:01  -  18 Apr 2018 20:39  --------------------------------------------------------  IN: 200 mL / OUT: 300 mL / NET: -100 mL        PHYSICAL EXAM:  General: No acute distress  HEENT: EOMI, PERRL  Neck: Supple, No JVD  Lungs: Clear to auscultation bilaterally; No rales or wheezing  Heart: Regular rate and rhythm; No murmurs, rubs, or gallops  Abdomen: Nontender, bowel sounds present  Extremities: No clubbing, cyanosis, or edema  Nervous system:  Alert & Oriented X3, no focal deficits  Psychiatric: Normal affect  Skin: No rashes or lesions      LABS:  04-18    143  |  107  |  16  ----------------------------<  119<H>  3.8   |  28  |  0.78    Ca    9.0      18 Apr 2018 07:24  Mg     1.9     04-18    TPro  6.6  /  Alb  3.3<L>  /  TBili  0.2  /  DBili  x   /  AST  12  /  ALT  17  /  AlkPhos  78  04-17    Creatinine Trend: 0.78<--, 0.82<--                        10.8   6.0   )-----------( 223      ( 18 Apr 2018 07:24 )             32.8     PT/INR - ( 17 Apr 2018 12:51 )   PT: 10.9 sec;   INR: 1.00 ratio         PTT - ( 17 Apr 2018 12:51 )  PTT:30.7 sec    Lipid Panel: Cholesterol, Serum 122  Direct LDL 47  HDL Cholesterol, Serum 58  Triglycerides, Serum 83    Cardiac Enzymes: CARDIAC MARKERS ( 18 Apr 2018 07:24 )  <0.015 ng/mL / x     / 110 U/L / x     / 1.4 ng/mL  CARDIAC MARKERS ( 17 Apr 2018 18:54 )  <0.015 ng/mL / x     / x     / x     / x      CARDIAC MARKERS ( 17 Apr 2018 12:51 )  <0.015 ng/mL / x     / x     / x     / x          Serum Pro-Brain Natriuretic Peptide: 168 pg/mL (04-17-18 @ 12:51)    04-18 [id number] 6.2    RADIOLOGY: < from: CT Angio Chest w/ IV Cont (04.17.18 @ 16:41) >  IMPRESSION: Limited evaluation for segmental and subsegmental pulmonary   embolism due to respiratory motion. No main, left, right, or lobar artery   pulmonary embolism.    < end of copied text >      ECG [my interpretation]:    TELEMETRY:    ECHO: < from: Transthoracic Echocardiogram (06.26.17 @ 13:01) >  CONCLUSIONS:  1. Normal mitral valve.  2. Calcified trileaflet aortic valve with decreased  opening.  3. Normal aortic root.  4. Mild left atrial enlargement.  5. Moderate concentric left ventricular hypertrophy.  6. Normal Left Ventricular Systolic Function,  (EF = 55 to  60%)  7. Grade I diastolic dysfunction  8. Normal right atrium.  9. Normal right ventricular size and function.  10. Unable to estimate RVSP.  11. Normal tricuspid valve.  12. Normal pulmonic valve.  13. Normal pericardium with no pericardial effusion.    < end of copied text > CHIEF COMPLAINT: Shortness of breath    HPI: 71 yo M with DM, HTN, bedbound after CVA, who presents with dyspnea x 3 weeks. Patient reports no cough but there is occasional production of clear-yellowish sputum. Symptoms have not been progressive. Denies chest pain, fevers/chills, lightheadedness, nausea, or vomiting. Patient states he feels somewhat better now.     PAST MEDICAL & SURGICAL HISTORY:  As above, also BPH (benign prostatic hyperplasia), Depression  No significant past surgical history      Allergies    No Known Allergies    MEDICATIONS  (STANDING):  amLODIPine   Tablet 10 milliGRAM(s) Oral daily  aspirin  chewable 81 milliGRAM(s) Oral daily  atorvastatin 40 milliGRAM(s) Oral at bedtime  clopidogrel Tablet 75 milliGRAM(s) Oral daily  donepezil 10 milliGRAM(s) Oral at bedtime  enalapril 10 milliGRAM(s) Oral daily  enoxaparin Injectable 40 milliGRAM(s) SubCutaneous daily  finasteride 5 milliGRAM(s) Oral daily  FLUoxetine 10 milliGRAM(s) Oral daily  gabapentin 300 milliGRAM(s) Oral daily  insulin lispro (HumaLOG) corrective regimen sliding scale   SubCutaneous three times a day before meals  pantoprazole    Tablet 40 milliGRAM(s) Oral before breakfast  tamsulosin 0.4 milliGRAM(s) Oral at bedtime    MEDICATIONS  (PRN):  ALBUTerol/ipratropium for Nebulization 3 milliLiter(s) Nebulizer every 6 hours PRN Shortness of Breath      FAMILY HISTORY:  No family history of premature coronary artery disease or sudden cardiac death    SOCIAL HISTORY:  Smoking-quit 20 years ago, about 5 pack-years  Alcohol-social  Illicit Drug use-denies    REVIEW OF SYSTEMS:  Constitutional: [ ] fever, [ ]weight loss,  [ ]fatigue  Eyes: [ ] visual changes  Respiratory: [x ]shortness of breath;  [ ] cough, [ ]wheezing, [ ]chills, [ ]hemoptysis  Cardiovascular: [ ] chest pain, [ ]palpitations, [ ]dizziness,  [ ]leg swelling  Gastrointestinal: [ ] abdominal pain, [ ]nausea, [ ]vomiting,  [ ]diarrhea   Genitourinary: [ ] dysuria, [ ] hematuria  Neurologic: [ ] headaches [ ] tremors  [ ] weakness [ ] lightheadedness  Skin: [ ] itching, [ ]burning, [ ] rashes  Endocrine: [ ] heat or cold intolerance  Musculoskeletal: [ ] joint pain or swelling; [ ] muscle, back, or extremity pain  Psychiatric: [ ] depression, [ ]anxiety, [ ]mood swings, or [ ]difficulty sleeping  Hematologic: [ ] easy bruising, [ ] bleeding gums       [ x] All others negative	  [ ] Unable to obtain    Vital Signs Last 24 Hrs  T(C): 36.7 (18 Apr 2018 19:27), Max: 36.9 (18 Apr 2018 04:51)  T(F): 98 (18 Apr 2018 19:27), Max: 98.5 (18 Apr 2018 04:51)  HR: 71 (18 Apr 2018 19:27) (67 - 79)  BP: 138/75 (18 Apr 2018 19:27) (119/58 - 167/72)  BP(mean): --  RR: 16 (18 Apr 2018 19:27) (16 - 16)  SpO2: 100% (18 Apr 2018 19:27) (96% - 100%)  I&O's Summary    18 Apr 2018 07:01  -  18 Apr 2018 20:39  --------------------------------------------------------  IN: 200 mL / OUT: 300 mL / NET: -100 mL        PHYSICAL EXAM:  General: No acute distress  HEENT: EOMI  Neck: Supple, No JVD  Lungs: Clear to auscultation bilaterally; No rales or wheezing  Heart: Regular rate and rhythm; No murmurs, rubs, or gallops  Abdomen: Nontender, bowel sounds present  Extremities: No clubbing, cyanosis, or edema  Nervous system:  Alert & Oriented X3, no focal deficits  Psychiatric: Normal affect  Skin: No rashes or lesions      LABS:  04-18    143  |  107  |  16  ----------------------------<  119<H>  3.8   |  28  |  0.78    Ca    9.0      18 Apr 2018 07:24  Mg     1.9     04-18    TPro  6.6  /  Alb  3.3<L>  /  TBili  0.2  /  DBili  x   /  AST  12  /  ALT  17  /  AlkPhos  78  04-17    Creatinine Trend: 0.78<--, 0.82<--                        10.8   6.0   )-----------( 223      ( 18 Apr 2018 07:24 )             32.8     PT/INR - ( 17 Apr 2018 12:51 )   PT: 10.9 sec;   INR: 1.00 ratio         PTT - ( 17 Apr 2018 12:51 )  PTT:30.7 sec    Lipid Panel: Cholesterol, Serum 122  Direct LDL 47  HDL Cholesterol, Serum 58  Triglycerides, Serum 83    Cardiac Enzymes: CARDIAC MARKERS ( 18 Apr 2018 07:24 )  <0.015 ng/mL / x     / 110 U/L / x     / 1.4 ng/mL  CARDIAC MARKERS ( 17 Apr 2018 18:54 )  <0.015 ng/mL / x     / x     / x     / x      CARDIAC MARKERS ( 17 Apr 2018 12:51 )  <0.015 ng/mL / x     / x     / x     / x          Serum Pro-Brain Natriuretic Peptide: 168 pg/mL (04-17-18 @ 12:51)    04-18 [id number] 6.2    RADIOLOGY: < from: CT Angio Chest w/ IV Cont (04.17.18 @ 16:41) >  IMPRESSION: Limited evaluation for segmental and subsegmental pulmonary   embolism due to respiratory motion. No main, left, right, or lobar artery   pulmonary embolism.    < end of copied text >      ECG [my interpretation]: 4/17/2018 @ 11:44: Sinus rhythm, possible anteroseptal infarct of unknown chronicity, unchanged from 06/2017.     TELEMETRY: Sinus rhythm with occasional PVCs    ECHO: < from: Transthoracic Echocardiogram (06.26.17 @ 13:01) >  CONCLUSIONS:  1. Normal mitral valve.  2. Calcified trileaflet aortic valve with decreased  opening.  3. Normal aortic root.  4. Mild left atrial enlargement.  5. Moderate concentric left ventricular hypertrophy.  6. Normal Left Ventricular Systolic Function,  (EF = 55 to  60%)  7. Grade I diastolic dysfunction  8. Normal right atrium.  9. Normal right ventricular size and function.  10. Unable to estimate RVSP.  11. Normal tricuspid valve.  12. Normal pulmonic valve.  13. Normal pericardium with no pericardial effusion.    < end of copied text >

## 2018-04-18 NOTE — CONSULT NOTE ADULT - SUBJECTIVE AND OBJECTIVE BOX
Source:    Chief Complaint:    HPI:          MEDICATIONS  (STANDING):  amLODIPine   Tablet 10 milliGRAM(s) Oral daily  aspirin  chewable 81 milliGRAM(s) Oral daily  atorvastatin 40 milliGRAM(s) Oral at bedtime  clopidogrel Tablet 75 milliGRAM(s) Oral daily  donepezil 10 milliGRAM(s) Oral at bedtime  enalapril 10 milliGRAM(s) Oral daily  enoxaparin Injectable 40 milliGRAM(s) SubCutaneous daily  finasteride 5 milliGRAM(s) Oral daily  FLUoxetine 10 milliGRAM(s) Oral daily  gabapentin 300 milliGRAM(s) Oral daily  insulin lispro (HumaLOG) corrective regimen sliding scale   SubCutaneous three times a day before meals  pantoprazole    Tablet 40 milliGRAM(s) Oral before breakfast  tamsulosin 0.4 milliGRAM(s) Oral at bedtime    MEDICATIONS  (PRN):  ALBUTerol/ipratropium for Nebulization 3 milliLiter(s) Nebulizer every 6 hours PRN Shortness of Breath    Allergies    No Known Allergies    Intolerances    PAST MEDICAL & SURGICAL HISTORY:  BPH (benign prostatic hyperplasia)  Depression  Hyperlipidemia  CVA (cerebral vascular accident)  Diabetes  No significant past surgical history    FAMILY HISTORY:  No pertinent family history in first degree relatives    SOCIAL HISTORY  Smoking History:   Alcohol:  Drugs:  Occupation:    T(C): 36.9 (18 @ 11:11), Max: 37 (18 @ 19:35)  HR: 75 (18 @ 11:11) (67 - 79)  BP: 119/58 (18 @ 11:11) (119/58 - 171/80)  RR: 16 (18 @ 11:11) (16 - 17)  SpO2: 98% (18 @ 11:11) (95% - 100%)    LABS:                        10.8   6.0   )-----------( 223      ( 2018 07:24 )             32.8         143  |  107  |  16  ----------------------------<  119<H>  3.8   |  28  |  0.78    Ca    9.0      2018 07:24  Mg     1.9         TPro  6.6  /  Alb  3.3<L>  /  TBili  0.2  /  DBili  x   /  AST  12  /  ALT  17  /  AlkPhos  78  04-17    PT/INR - ( 2018 12:51 )   PT: 10.9 sec;   INR: 1.00 ratio         PTT - ( 2018 12:51 )  PTT:30.7 sec  Urinalysis Basic - ( 2018 17:45 )    Color: Yellow / Appearance: Clear / S.020 / pH: x  Gluc: x / Ketone: Negative  / Bili: Small / Urobili: 1   Blood: x / Protein: 100 / Nitrite: Negative   Leuk Esterase: Negative / RBC: 0-2 /HPF / WBC 3-5 /HPF   Sq Epi: x / Non Sq Epi: Few /HPF / Bacteria: Many /HPF    CARDIAC MARKERS ( 2018 07:24 )  <0.015 ng/mL / x     / 110 U/L / x     / 1.4 ng/mL  CARDIAC MARKERS ( 2018 18:54 )  <0.015 ng/mL / x     / x     / x     / x      CARDIAC MARKERS ( 2018 12:51 )  <0.015 ng/mL / x     / x     / x     / x          Serum Pro-Brain Natriuretic Peptide: 168 pg/mL (18 @ 12:51)    Microbiology    RADIOLOGY & ADDITIONAL STUDIES: (My Reading)- CXR- No acute infiltrates-. CT angio- limited study, no large PE seen. No infiltrates Source: Patient and chart    Chief Complaint: Abnormal CXR    HPI:  72 yr old M from home, bedbound, legally blind, former smoker (quit 20 yrs ago) with PMH of CVA (x3), DM, Htn, HLD, BPH, Depression, BPH? came with complain of Shortness of breath x 3 weeks. He went to his PCP where his CXR was abnormal and was sent to the ED and admitted for further evaluation. He dyspnea is sporadic and associated with occasional coughing with whitish phlegm. He denies any orthopnea, PND, or increasing lower extremity edema. He has never had these symptoms before. He denies any fevers or chills. No dysphagia. He has no chest pain. No night sweats. He is bedbound since his stroke. He states he was diagnosed with asthma when he was younger and was on inhalers but hasn't been on for many years.    MEDICATIONS  (STANDING):  amLODIPine   Tablet 10 milliGRAM(s) Oral daily  aspirin  chewable 81 milliGRAM(s) Oral daily  atorvastatin 40 milliGRAM(s) Oral at bedtime  clopidogrel Tablet 75 milliGRAM(s) Oral daily  donepezil 10 milliGRAM(s) Oral at bedtime  enalapril 10 milliGRAM(s) Oral daily  enoxaparin Injectable 40 milliGRAM(s) SubCutaneous daily  finasteride 5 milliGRAM(s) Oral daily  FLUoxetine 10 milliGRAM(s) Oral daily  gabapentin 300 milliGRAM(s) Oral daily  insulin lispro (HumaLOG) corrective regimen sliding scale   SubCutaneous three times a day before meals  pantoprazole    Tablet 40 milliGRAM(s) Oral before breakfast  tamsulosin 0.4 milliGRAM(s) Oral at bedtime    MEDICATIONS  (PRN):  ALBUTerol/ipratropium for Nebulization 3 milliLiter(s) Nebulizer every 6 hours PRN Shortness of Breath    Allergies    No Known Allergies    Intolerances    PAST MEDICAL & SURGICAL HISTORY:  BPH (benign prostatic hyperplasia)  Depression  Hyperlipidemia  CVA (cerebral vascular accident)  Diabetes  No significant past surgical history    FAMILY HISTORY:  No pertinent family history in first degree relatives    SOCIAL HISTORY  Smoking History: Exsmoker, smoked for ~10 years, <1 ppd quit over 20 years ago.  Alcohol: No  Drugs: No  Occupation: former maintainance worker    T(C): 36.9 (18 @ 11:11), Max: 37 (18 @ 19:35)  HR: 75 (18 @ 11:11) (67 - 79)  BP: 119/58 (18 @ 11:11) (119/58 - 171/80)  RR: 16 (18 @ 11:11) (16 - 17)  SpO2: 98% (18 @ 11:11) (95% - 100%)    LABS:                        10.8   6.0   )-----------( 223      ( 2018 07:24 )             32.8         143  |  107  |  16  ----------------------------<  119<H>  3.8   |  28  |  0.78    Ca    9.0      2018 07:24  Mg     1.9         TPro  6.6  /  Alb  3.3<L>  /  TBili  0.2  /  DBili  x   /  AST  12  /  ALT  17  /  AlkPhos  78  17    PT/INR - ( 2018 12:51 )   PT: 10.9 sec;   INR: 1.00 ratio         PTT - ( 2018 12:51 )  PTT:30.7 sec  Urinalysis Basic - ( 2018 17:45 )    Color: Yellow / Appearance: Clear / S.020 / pH: x  Gluc: x / Ketone: Negative  / Bili: Small / Urobili: 1   Blood: x / Protein: 100 / Nitrite: Negative   Leuk Esterase: Negative / RBC: 0-2 /HPF / WBC 3-5 /HPF   Sq Epi: x / Non Sq Epi: Few /HPF / Bacteria: Many /HPF    CARDIAC MARKERS ( 2018 07:24 )  <0.015 ng/mL / x     / 110 U/L / x     / 1.4 ng/mL  CARDIAC MARKERS ( 2018 18:54 )  <0.015 ng/mL / x     / x     / x     / x      CARDIAC MARKERS ( 2018 12:51 )  <0.015 ng/mL / x     / x     / x     / x          Serum Pro-Brain Natriuretic Peptide: 168 pg/mL (18 @ 12:51)    Microbiology    RADIOLOGY & ADDITIONAL STUDIES: (My Reading)- CXR- No acute infiltrates-. CT angio- limited study, no large PE seen. No infiltrates

## 2018-04-18 NOTE — PROGRESS NOTE ADULT - SUBJECTIVE AND OBJECTIVE BOX
PGY 1 Note discussed with supervising resident and primary attending    Patient is a 72y old  Male who presents with a chief complaint of Shortness of breath (2018 21:17)    INTERVAL HPI/OVERNIGHT EVENTS: No acute events reported overnight.  Today pt presents in no acute distress.  Pt found resting comfortably in bed.  Pt states SOB is improving, but he continues to have difficult breathing. Otherwise, no new complaints today.     MEDICATIONS  (STANDING):  amLODIPine   Tablet 10 milliGRAM(s) Oral daily  aspirin  chewable 81 milliGRAM(s) Oral daily  atorvastatin 40 milliGRAM(s) Oral at bedtime  clopidogrel Tablet 75 milliGRAM(s) Oral daily  donepezil 10 milliGRAM(s) Oral at bedtime  enalapril 10 milliGRAM(s) Oral daily  enoxaparin Injectable 40 milliGRAM(s) SubCutaneous daily  finasteride 5 milliGRAM(s) Oral daily  FLUoxetine 10 milliGRAM(s) Oral daily  gabapentin 300 milliGRAM(s) Oral daily  insulin lispro (HumaLOG) corrective regimen sliding scale   SubCutaneous three times a day before meals  pantoprazole    Tablet 40 milliGRAM(s) Oral before breakfast  tamsulosin 0.4 milliGRAM(s) Oral at bedtime    MEDICATIONS  (PRN):  ALBUTerol/ipratropium for Nebulization 3 milliLiter(s) Nebulizer every 6 hours PRN Shortness of Breath      __________________________________________________  REVIEW OF SYSTEMS:    CONSTITUTIONAL: No fever,   EYES: no acute visual disturbances  NECK: No pain or stiffness  RESPIRATORY: + cough; + shortness of breath  CARDIOVASCULAR: No chest pain, no palpitations  GASTROINTESTINAL: No pain. No nausea or vomiting; No diarrhea   NEUROLOGICAL: No headache or numbness, no tremors  MUSCULOSKELETAL: No joint pain, no muscle pain    Vital Signs Last 24 Hrs  T(C): 36.9 (2018 11:11), Max: 37 (2018 19:35)  T(F): 98.4 (2018 11:11), Max: 98.6 (2018 19:35)  HR: 75 (2018 11:11) (67 - 79)  BP: 119/58 (2018 11:11) (119/58 - 171/80)  RR: 16 (2018 11:11) (16 - 17)  SpO2: 98% (2018 11:11) (95% - 100%)    ________________________________________________  PHYSICAL EXAM:  GENERAL: NAD  HEENT: Normocephalic;  conjunctivae and sclerae clear; moist mucous membranes;   NECK : supple  CHEST/LUNG: + Ronchorus breath sounds appreciated bilaterally.  HEART: S1 S2  regular; no murmurs, gallops or rubs  ABDOMEN: Soft, Nontender, Nondistended; Bowel sounds present  EXTREMITIES: no cyanosis; no edema; no calf tenderness  NERVOUS SYSTEM:  Awake and alert; Oriented  to place, person and time ; no new deficits    _________________________________________________  LABS:                        10.8   6.0   )-----------( 223      ( 2018 07:24 )             32.8     04-18    143  |  107  |  16  ----------------------------<  119<H>  3.8   |  28  |  0.78    Ca    9.0      2018 07:24  Mg     1.9     -18    TPro  6.6  /  Alb  3.3<L>  /  TBili  0.2  /  DBili  x   /  AST  12  /  ALT  17  /  AlkPhos  78  04-17    PT/INR - ( 2018 12:51 )   PT: 10.9 sec;   INR: 1.00 ratio         PTT - ( 2018 12:51 )  PTT:30.7 sec  Urinalysis Basic - ( 2018 17:45 )    Color: Yellow / Appearance: Clear / S.020 / pH: x  Gluc: x / Ketone: Negative  / Bili: Small / Urobili: 1   Blood: x / Protein: 100 / Nitrite: Negative   Leuk Esterase: Negative / RBC: 0-2 /HPF / WBC 3-5 /HPF   Sq Epi: x / Non Sq Epi: Few /HPF / Bacteria: Many /HPF      CAPILLARY BLOOD GLUCOSE      POCT Blood Glucose.: 152 mg/dL (2018 11:34)  POCT Blood Glucose.: 131 mg/dL (2018 07:38)  POCT Blood Glucose.: 128 mg/dL (2018 22:31)      RADIOLOGY & ADDITIONAL TESTS:    Imaging Personally Reviewed:  YES    Consultant(s) Notes Reviewed:   YES    Care Discussed with Consultants :     Plan of care was discussed with patient and /or primary care giver; all questions and concerns were addressed and care was aligned with patient's wishes.

## 2018-04-18 NOTE — CONSULT NOTE ADULT - ASSESSMENT
1) Dyspnea-unclear etiology- DDx- silent aspiration, cardiac ischemia, chronic thromoembolic disease, asthma  2) Hx of multiple CVAs  3) Bedbound    Clinically stable with no evidence of increased work of breathing and hypoxia  No evidence of aspiration or signs of cardiac ischemia  CT angio is negative for any chronic infiltrates or suggestion of ILD  Low BNP makes pulmonary HTN or CHF less likely  Given his bedbound status, he will unlikely be able to perform any adequate PFTs  Agree with echocardiogram  If echocardiogram unrekarkable, can empirically treat supportively with albuterol nebulizer treatments q6hrs as he will unlikely be able to use MDIs given his current status  He can continue to follow up with his PCP for further management.

## 2018-04-19 ENCOUNTER — TRANSCRIPTION ENCOUNTER (OUTPATIENT)
Age: 73
End: 2018-04-19

## 2018-04-19 VITALS
SYSTOLIC BLOOD PRESSURE: 145 MMHG | TEMPERATURE: 98 F | HEART RATE: 70 BPM | DIASTOLIC BLOOD PRESSURE: 67 MMHG | OXYGEN SATURATION: 99 % | RESPIRATION RATE: 16 BRPM

## 2018-04-19 PROCEDURE — 83880 ASSAY OF NATRIURETIC PEPTIDE: CPT

## 2018-04-19 PROCEDURE — 70450 CT HEAD/BRAIN W/O DYE: CPT

## 2018-04-19 PROCEDURE — 82962 GLUCOSE BLOOD TEST: CPT

## 2018-04-19 PROCEDURE — 93306 TTE W/DOPPLER COMPLETE: CPT

## 2018-04-19 PROCEDURE — 84484 ASSAY OF TROPONIN QUANT: CPT

## 2018-04-19 PROCEDURE — 99232 SBSQ HOSP IP/OBS MODERATE 35: CPT

## 2018-04-19 PROCEDURE — 81001 URINALYSIS AUTO W/SCOPE: CPT

## 2018-04-19 PROCEDURE — 85610 PROTHROMBIN TIME: CPT

## 2018-04-19 PROCEDURE — 85730 THROMBOPLASTIN TIME PARTIAL: CPT

## 2018-04-19 PROCEDURE — 85045 AUTOMATED RETICULOCYTE COUNT: CPT

## 2018-04-19 PROCEDURE — G0378: CPT

## 2018-04-19 PROCEDURE — 80061 LIPID PANEL: CPT

## 2018-04-19 PROCEDURE — 83735 ASSAY OF MAGNESIUM: CPT

## 2018-04-19 PROCEDURE — 82550 ASSAY OF CK (CPK): CPT

## 2018-04-19 PROCEDURE — 87581 M.PNEUMON DNA AMP PROBE: CPT

## 2018-04-19 PROCEDURE — 71045 X-RAY EXAM CHEST 1 VIEW: CPT

## 2018-04-19 PROCEDURE — 99239 HOSP IP/OBS DSCHRG MGMT >30: CPT

## 2018-04-19 PROCEDURE — 83615 LACTATE (LD) (LDH) ENZYME: CPT

## 2018-04-19 PROCEDURE — 85027 COMPLETE CBC AUTOMATED: CPT

## 2018-04-19 PROCEDURE — 83550 IRON BINDING TEST: CPT

## 2018-04-19 PROCEDURE — 87798 DETECT AGENT NOS DNA AMP: CPT

## 2018-04-19 PROCEDURE — 93005 ELECTROCARDIOGRAM TRACING: CPT

## 2018-04-19 PROCEDURE — 84443 ASSAY THYROID STIM HORMONE: CPT

## 2018-04-19 PROCEDURE — 71275 CT ANGIOGRAPHY CHEST: CPT

## 2018-04-19 PROCEDURE — 80053 COMPREHEN METABOLIC PANEL: CPT

## 2018-04-19 PROCEDURE — 82553 CREATINE MB FRACTION: CPT

## 2018-04-19 PROCEDURE — 80048 BASIC METABOLIC PNL TOTAL CA: CPT

## 2018-04-19 PROCEDURE — 82607 VITAMIN B-12: CPT

## 2018-04-19 PROCEDURE — 87633 RESP VIRUS 12-25 TARGETS: CPT

## 2018-04-19 PROCEDURE — 87486 CHLMYD PNEUM DNA AMP PROBE: CPT

## 2018-04-19 PROCEDURE — 99285 EMERGENCY DEPT VISIT HI MDM: CPT | Mod: 25

## 2018-04-19 PROCEDURE — 87086 URINE CULTURE/COLONY COUNT: CPT

## 2018-04-19 PROCEDURE — 83036 HEMOGLOBIN GLYCOSYLATED A1C: CPT

## 2018-04-19 PROCEDURE — 82728 ASSAY OF FERRITIN: CPT

## 2018-04-19 RX ORDER — FINASTERIDE 5 MG/1
1 TABLET, FILM COATED ORAL
Qty: 30 | Refills: 0 | OUTPATIENT
Start: 2018-04-19 | End: 2018-05-18

## 2018-04-19 RX ORDER — ALBUTEROL 90 UG/1
1 AEROSOL, METERED ORAL DAILY
Qty: 0 | Refills: 0 | Status: DISCONTINUED | OUTPATIENT
Start: 2018-04-19 | End: 2018-04-19

## 2018-04-19 RX ORDER — ALBUTEROL 90 UG/1
1 AEROSOL, METERED ORAL
Qty: 1 | Refills: 0
Start: 2018-04-19 | End: 2018-05-18

## 2018-04-19 RX ADMIN — ENOXAPARIN SODIUM 40 MILLIGRAM(S): 100 INJECTION SUBCUTANEOUS at 12:12

## 2018-04-19 RX ADMIN — Medication 10 MILLIGRAM(S): at 05:13

## 2018-04-19 RX ADMIN — Medication 10 MILLIGRAM(S): at 12:12

## 2018-04-19 RX ADMIN — AMLODIPINE BESYLATE 10 MILLIGRAM(S): 2.5 TABLET ORAL at 05:13

## 2018-04-19 RX ADMIN — Medication 81 MILLIGRAM(S): at 12:12

## 2018-04-19 RX ADMIN — GABAPENTIN 300 MILLIGRAM(S): 400 CAPSULE ORAL at 12:12

## 2018-04-19 RX ADMIN — FINASTERIDE 5 MILLIGRAM(S): 5 TABLET, FILM COATED ORAL at 12:12

## 2018-04-19 RX ADMIN — CLOPIDOGREL BISULFATE 75 MILLIGRAM(S): 75 TABLET, FILM COATED ORAL at 12:12

## 2018-04-19 RX ADMIN — PANTOPRAZOLE SODIUM 40 MILLIGRAM(S): 20 TABLET, DELAYED RELEASE ORAL at 05:13

## 2018-04-19 NOTE — PROGRESS NOTE ADULT - SUBJECTIVE AND OBJECTIVE BOX
Patient is a 72y old  Male who presents with a chief complaint of Shortness of breath (2018 10:13)      INTERVAL HPI/OVERNIGHT EVENTS: patient seen and examined at bedside. Has no new complains. No overnight events.     MEDICATIONS  (STANDING):  amLODIPine   Tablet 10 milliGRAM(s) Oral daily  aspirin  chewable 81 milliGRAM(s) Oral daily  atorvastatin 40 milliGRAM(s) Oral at bedtime  clopidogrel Tablet 75 milliGRAM(s) Oral daily  donepezil 10 milliGRAM(s) Oral at bedtime  enalapril 10 milliGRAM(s) Oral daily  enoxaparin Injectable 40 milliGRAM(s) SubCutaneous daily  finasteride 5 milliGRAM(s) Oral daily  FLUoxetine 10 milliGRAM(s) Oral daily  gabapentin 300 milliGRAM(s) Oral daily  insulin lispro (HumaLOG) corrective regimen sliding scale   SubCutaneous three times a day before meals  pantoprazole    Tablet 40 milliGRAM(s) Oral before breakfast  tamsulosin 0.4 milliGRAM(s) Oral at bedtime    MEDICATIONS  (PRN):  ALBUTerol/ipratropium for Nebulization 3 milliLiter(s) Nebulizer every 6 hours PRN Shortness of Breath      Allergies    No Known Allergies    Intolerances        REVIEW OF SYSTEMS:  CONSTITUTIONAL: No fever, weight loss, or fatigue  RESPIRATORY: SOB, No cough, wheezing, chills or hemoptysis;   CARDIOVASCULAR: No chest pain, palpitations, dizziness, or leg swelling  GASTROINTESTINAL: No abdominal or epigastric pain. No nausea, vomiting, or hematemesis; No diarrhea or constipation. No melena or hematochezia.  NEUROLOGICAL: No headaches, memory loss, loss of strength, numbness, or tremors  MUSCULOSKELETAL: No joint pain or swelling; No muscle, back, or extremity pain      Vital Signs Last 24 Hrs  T(C): 36.9 (2018 11:13), Max: 36.9 (2018 11:13)  T(F): 98.4 (2018 11:13), Max: 98.4 (2018 11:13)  HR: 70 (2018 11:13) (65 - 71)  BP: 145/67 (2018 11:13) (120/72 - 151/68)  BP(mean): --  RR: 16 (2018 11:13) (16 - 16)  SpO2: 99% (2018 11:13) (98% - 100%)    PHYSICAL EXAM:  GENERAL: NAD,   HEAD:  Atraumatic, Normocephalic  EYES: conjunctiva and sclera clear, legally blind     NECK: Supple, No JVD, Normal thyroid  NERVOUS SYSTEM:  Alert & Oriented X3, right sided weakness   CHEST/LUNG: Clear to percussion bilaterally; No rales, rhonchi, wheezing, or rubs  HEART: Regular rate and rhythm; No murmurs, rubs, or gallops  ABDOMEN: Soft, Nontender, Nondistended; Bowel sounds present  EXTREMITIES:  No clubbing, cyanosis, or edema  SKIN: No rashes or lesions    LABS:                        10.8   6.0   )-----------( 223      ( 2018 07:24 )             32.8     -18    143  |  107  |  16  ----------------------------<  119<H>  3.8   |  28  |  0.78    Ca    9.0      2018 07:24  Mg     1.9     18        Urinalysis Basic - ( 2018 17:45 )    Color: Yellow / Appearance: Clear / S.020 / pH: x  Gluc: x / Ketone: Negative  / Bili: Small / Urobili: 1   Blood: x / Protein: 100 / Nitrite: Negative   Leuk Esterase: Negative / RBC: 0-2 /HPF / WBC 3-5 /HPF   Sq Epi: x / Non Sq Epi: Few /HPF / Bacteria: Many /HPF      CAPILLARY BLOOD GLUCOSE      POCT Blood Glucose.: 144 mg/dL (2018 11:22)  POCT Blood Glucose.: 138 mg/dL (2018 07:30)  POCT Blood Glucose.: 166 mg/dL (2018 21:20)  POCT Blood Glucose.: 127 mg/dL (2018 16:38)      RADIOLOGY & ADDITIONAL TESTS:    Imaging Personally Reviewed:  [x ] YES  [ ] NO    Consultant(s) Notes Reviewed:  [x] YES  [ ] NO    Care Discussed with Consultants/Other Providers [x ] YES  [ ] NO    Plan of Care discussed with Housestaff [x ]YES [ ] NO

## 2018-04-19 NOTE — DISCHARGE NOTE ADULT - CARE PLAN
Principal Discharge DX:	Shortness of breath  Goal:	Please continue inhaler therapy, and schedule a close follow up with your PCP within 1 week of discharge  Assessment and plan of treatment:	You presented with SOB, and were admitted for acute coronary syndrome workup.  CArdiac enzymes were negative, BNP was within normal limits, and your echo was sig for  preserved Lt ventricular systolic function EF >60%, and Grade I DD.  You were seen by pulm, Dr. Desai, who believes you may suffer from a chronic obstructive lung disease vs obstructive sleep apnea.  Please schedule close follow up with your PCP within 1 week of discharge for follow up.  Secondary Diagnosis:	HTN (hypertension)  Goal:	Please continue antihypertensive regimen  Assessment and plan of treatment:	You BP was well controlled.  Please continue current regimen, and schedule a close follow up with your PCP in 1 week for BP check.  Secondary Diagnosis:	Diabetes  Goal:	HbA1c: 6.2  Assessment and plan of treatment:	Your diabetes is well controlled.  Please continue current diabetic regimen.  Secondary Diagnosis:	Depression  Goal:	Please continue antidepressant therapy.  Assessment and plan of treatment:	You do not exhibit signs of acute depression.  Please continue current therapy, and follow up with PCP  Secondary Diagnosis:	Hyperlipidemia  Goal:	Please continue statin therapy, and schedule close follow up with your PCP  Secondary Diagnosis:	BPH (benign prostatic hyperplasia)  Goal:	Please continue flomax therapy

## 2018-04-19 NOTE — PROGRESS NOTE ADULT - ASSESSMENT
1) Dyspnea-unclear etiology- DDx- silent aspiration, cardiac ischemia, chronic thromoembolic disease, asthma  2) Hx of multiple CVAs  3) Bedbound    Clinically stable, no change overnight  Echocardiogram pending  So far workup is negative for any serious illness  Continue symptomatic support with duonebs as needed  Low suspicion for thromboembolic disease  Stable for d/c from a pulmonary standpoint 1) Dyspnea-unclear etiology- DDx- silent aspiration, cardiac ischemia, chronic thromoembolic disease, asthma  2) Hx of multiple CVAs  3) Bedbound    Clinically stable, no change overnight  Echocardiogram pending  So far workup is negative for any serious illness  Continue symptomatic support with duonebs as needed  Low suspicion for thromboembolic disease  He could have some sleep disordered breathing given hx of multiple strokes  but it is unclear how much benefit he can get from an outpatient sleep study given his bedbound state  Workup can be considered as an outpatient with his PCP  Stable for d/c from a pulmonary standpoint

## 2018-04-19 NOTE — PROGRESS NOTE ADULT - SUBJECTIVE AND OBJECTIVE BOX
Interval Events: No overnight events. He complaints of some phlegm    OBJECTIVE:    T(C): 36.3 (2018 07:23), Max: 36.9 (2018 11:11)  T(F): 97.3 (2018 07:23), Max: 98.4 (2018 11:11)  HR: 65 (2018 07:23) (65 - 75)  BP: 151/68 (2018 07:23) (119/58 - 151/68)  RR: 16 (2018 07:23) (16 - 16)  SpO2: 99% (2018 07:23) (98% - 100%)     @ 07:  -   @ 07:00  --------------------------------------------------------  IN: 610 mL / OUT: 800 mL / NET: -190 mL     @ 07: @ 08:38  --------------------------------------------------------  IN: 200 mL / OUT: 0 mL / NET: 200 mL    CAPILLARY BLOOD GLUCOSE    POCT Blood Glucose.: 138 mg/dL (2018 07:30)    PHYSICAL EXAM:  General: Awake, alert, oriented X 3.   Respiratory: Clear to auscultation bilaterally, no wheezes, rhonchi, or rales  Cardiovascular: S1 S2 normal. No murmurs, rubs or gallops.   Abdomen: Soft, non-tender, non-distended. No organomegaly.  Extremities: Warm to touch. Peripheral pulse palpable. No pedal edema.   Skin: No rashes or skin lesions  Neurological: Bilateral lower extremity weakness and LUE weakness  Psychiatry: Appropriate mood and affect.    HOSPITAL MEDICATIONS:  MEDICATIONS  (STANDING):  amLODIPine   Tablet 10 milliGRAM(s) Oral daily  aspirin  chewable 81 milliGRAM(s) Oral daily  atorvastatin 40 milliGRAM(s) Oral at bedtime  clopidogrel Tablet 75 milliGRAM(s) Oral daily  donepezil 10 milliGRAM(s) Oral at bedtime  enalapril 10 milliGRAM(s) Oral daily  enoxaparin Injectable 40 milliGRAM(s) SubCutaneous daily  finasteride 5 milliGRAM(s) Oral daily  FLUoxetine 10 milliGRAM(s) Oral daily  gabapentin 300 milliGRAM(s) Oral daily  insulin lispro (HumaLOG) corrective regimen sliding scale   SubCutaneous three times a day before meals  pantoprazole    Tablet 40 milliGRAM(s) Oral before breakfast  tamsulosin 0.4 milliGRAM(s) Oral at bedtime    MEDICATIONS  (PRN):  ALBUTerol/ipratropium for Nebulization 3 milliLiter(s) Nebulizer every 6 hours PRN Shortness of Breath    LABS:                        10.8   6.0   )-----------( 223      ( 2018 07:24 )             32.8     04-18    143  |  107  |  16  ----------------------------<  119<H>  3.8   |  28  |  0.78    Ca    9.0      2018 07:24  Mg     1.9     18    TPro  6.6  /  Alb  3.3<L>  /  TBili  0.2  /  DBili  x   /  AST  12  /  ALT  17  /  AlkPhos  78  04-17    PT/INR - ( 2018 12:51 )   PT: 10.9 sec;   INR: 1.00 ratio         PTT - ( 2018 12:51 )  PTT:30.7 sec  Urinalysis Basic - ( 2018 17:45 )    Color: Yellow / Appearance: Clear / S.020 / pH: x  Gluc: x / Ketone: Negative  / Bili: Small / Urobili: 1   Blood: x / Protein: 100 / Nitrite: Negative   Leuk Esterase: Negative / RBC: 0-2 /HPF / WBC 3-5 /HPF   Sq Epi: x / Non Sq Epi: Few /HPF / Bacteria: Many /HPF    RADIOLOGY:  [ ] Reviewed and interpreted by me. No new imaging

## 2018-04-19 NOTE — DISCHARGE NOTE ADULT - PATIENT PORTAL LINK FT
You can access the [facility] Patient Portal, offered by [facility], by registering with the following website: [email or web address]

## 2018-04-19 NOTE — DISCHARGE NOTE ADULT - HOSPITAL COURSE
Pt is a 72 yr old M, from home, bedbound, legally blind, former smoker (quit 20 yrs ago) with PMH of CVA (x3), DM, Htn, HLD, BPH, Depression, BPH? presents with complaints of Shortness of breath x 3 weeks. Patient and his wife at bedside giving history states he has intermittent dysnea since 3 weeks, especially in morning, started gradually, aggravated when he sits up on bed and when he is anxious. Dyspnea is associated with cough and scant sputum ( milky white in colour) . Patient went to Primary medical doctor today who did CXR which showed suspicious lesion and that's why was sent to hospital.     In ED, Patient's vital sign were stable, RVP was negative, EKG NSR, No ST-T wave changes. CT angio neg for PE.  CXR: shows No consolidation or pleural effusion. Heart size within normal limits. BNP WNL.  Chest x-ray neg for consolidation of effusion.  Pt admitted to tele for chf vs Chronic lung disease.     Tele remained uneventful.  Pt evaluated by cardio, Dr. Osborne, who pursued echo which showed nml LVSF w/ G I DD.    Pt eval by pulm who believes he may have chronic lung disease, but due to bed bound status, there may not be utility in pursuing PFT's at this time.    Pt may have sleep apnea, but will let PCP decide if sleep study should be pursued.     Pt is currently medically stable for discharge with instruction to shedule close follow up with his PCP within 1 week.

## 2018-04-19 NOTE — DISCHARGE NOTE ADULT - MEDICATION SUMMARY - MEDICATIONS TO TAKE
I will START or STAY ON the medications listed below when I get home from the hospital:    finasteride 5 mg oral tablet  -- 1 tab(s) by mouth once a day  -- Indication: For BPH (benign prostatic hyperplasia)    aspirin 325 mg oral tablet  -- 1 tab(s) by mouth once a day  -- Indication: For CVA (cerebral vascular accident)    enalapril 10 mg oral tablet  -- 1 tab(s) by mouth once a day  -- Indication: For HTN (hypertension)    tamsulosin 0.4 mg oral capsule  -- 1 cap(s) by mouth once a day  -- Indication: For BPH (benign prostatic hyperplasia)    gabapentin 300 mg oral tablet  --  by mouth 2 times a day  -- Indication: For Diabetes    FLUoxetine 10 mg oral tablet  -- 1 tab(s) by mouth once a day  -- Indication: For Diabetes    metFORMIN 1000 mg oral tablet  -- 1 tab(s) by mouth 2 times a day  -- Indication: For Diabetes    Levemir 100 units/mL subcutaneous solution  -- 24 unit(s) subcutaneous 2 times a day  -- Indication: For Diabetes    Januvia 100 mg oral tablet  -- 1 tab(s) by mouth once a day  -- Indication: For Diabetes    Lipitor 40 mg oral tablet  -- 1 tab(s) by mouth once a day  -- Avoid grapefruit and grapefruit juice while taking this medication.  Do not take this drug if you are pregnant.  It is very important that you take or use this exactly as directed.  Do not skip doses or discontinue unless directed by your doctor.  Obtain medical advice before taking any non-prescription drugs as some may affect the action of this medication.  Take with food or milk.    -- Indication: For Hyperlipidemia    Plavix 75 mg oral tablet  -- 1 tab(s) by mouth once a day  -- Indication: For Prophylactic measure    amLODIPine 10 mg oral tablet  -- 1 tab(s) by mouth once a day  -- Indication: For HTN (hypertension)    donepezil 10 mg oral tablet  -- 1 tab(s) by mouth once a day (at bedtime)  -- Indication: For Depression    Probiotic Formula oral capsule  -- 1 cap(s) by mouth once a day  -- Indication: For Prophylactic measure I will START or STAY ON the medications listed below when I get home from the hospital:    finasteride 5 mg oral tablet  -- 1 tab(s) by mouth once a day  -- Indication: For BPH (benign prostatic hyperplasia)    aspirin 325 mg oral tablet  -- 1 tab(s) by mouth once a day  -- Indication: For CVA (cerebral vascular accident)    enalapril 10 mg oral tablet  -- 1 tab(s) by mouth once a day  -- Indication: For HTN (hypertension)    tamsulosin 0.4 mg oral capsule  -- 1 cap(s) by mouth once a day  -- Indication: For BPH (benign prostatic hyperplasia)    gabapentin 300 mg oral tablet  --  by mouth 2 times a day  -- Indication: For Diabetes    FLUoxetine 10 mg oral tablet  -- 1 tab(s) by mouth once a day  -- Indication: For Diabetes    metFORMIN 1000 mg oral tablet  -- 1 tab(s) by mouth 2 times a day  -- Indication: For Diabetes    Levemir 100 units/mL subcutaneous solution  -- 24 unit(s) subcutaneous 2 times a day  -- Indication: For Diabetes    Januvia 100 mg oral tablet  -- 1 tab(s) by mouth once a day  -- Indication: For Diabetes    Lipitor 40 mg oral tablet  -- 1 tab(s) by mouth once a day  -- Avoid grapefruit and grapefruit juice while taking this medication.  Do not take this drug if you are pregnant.  It is very important that you take or use this exactly as directed.  Do not skip doses or discontinue unless directed by your doctor.  Obtain medical advice before taking any non-prescription drugs as some may affect the action of this medication.  Take with food or milk.    -- Indication: For Hyperlipidemia    Plavix 75 mg oral tablet  -- 1 tab(s) by mouth once a day  -- Indication: For Prophylactic measure    albuterol 90 mcg/inh inhalation aerosol  -- 1 puff(s) inhaled once a day  -- Indication: For Shortness of breath    amLODIPine 10 mg oral tablet  -- 1 tab(s) by mouth once a day  -- Indication: For HTN (hypertension)    donepezil 10 mg oral tablet  -- 1 tab(s) by mouth once a day (at bedtime)  -- Indication: For Depression    Probiotic Formula oral capsule  -- 1 cap(s) by mouth once a day  -- Indication: For Prophylactic measure

## 2018-04-19 NOTE — PROGRESS NOTE ADULT - PROBLEM SELECTOR PLAN 1
No clear etiology of dyspnea found   Asymptomatic during this hospital stay   No cardiac or pulmonary causes identified  Anxiety still can not be ruled out   Patient stable for discharge

## 2018-05-02 DIAGNOSIS — F02.80 DEMENTIA IN OTHER DISEASES CLASSIFIED ELSEWHERE, UNSPECIFIED SEVERITY, WITHOUT BEHAVIORAL DISTURBANCE, PSYCHOTIC DISTURBANCE, MOOD DISTURBANCE, AND ANXIETY: ICD-10-CM

## 2018-05-02 DIAGNOSIS — I63.9 CEREBRAL INFARCTION, UNSPECIFIED: ICD-10-CM

## 2018-05-02 DIAGNOSIS — E11.40 TYPE 2 DIABETES MELLITUS WITH DIABETIC NEUROPATHY, UNSPECIFIED: ICD-10-CM

## 2018-05-02 DIAGNOSIS — E11.9 TYPE 2 DIABETES MELLITUS WITHOUT COMPLICATIONS: ICD-10-CM

## 2018-06-08 ENCOUNTER — RX RENEWAL (OUTPATIENT)
Age: 73
End: 2018-06-08

## 2018-07-19 PROBLEM — N40.0 BENIGN PROSTATIC HYPERPLASIA WITHOUT LOWER URINARY TRACT SYMPTOMS: Chronic | Status: ACTIVE | Noted: 2018-04-17

## 2018-07-19 PROBLEM — F32.9 MAJOR DEPRESSIVE DISORDER, SINGLE EPISODE, UNSPECIFIED: Chronic | Status: ACTIVE | Noted: 2018-04-17

## 2018-07-19 PROBLEM — H54.8 LEGAL BLINDNESS, AS DEFINED IN USA: Chronic | Status: ACTIVE | Noted: 2018-04-19

## 2018-07-19 PROBLEM — E78.5 HYPERLIPIDEMIA, UNSPECIFIED: Chronic | Status: ACTIVE | Noted: 2018-04-17

## 2018-07-27 ENCOUNTER — APPOINTMENT (OUTPATIENT)
Dept: PULMONOLOGY | Facility: CLINIC | Age: 73
End: 2018-07-27
Payer: MEDICARE

## 2018-07-27 VITALS
RESPIRATION RATE: 16 BRPM | OXYGEN SATURATION: 96 % | HEART RATE: 84 BPM | TEMPERATURE: 98.3 F | DIASTOLIC BLOOD PRESSURE: 80 MMHG | BODY MASS INDEX: 27.4 KG/M2 | WEIGHT: 202 LBS | SYSTOLIC BLOOD PRESSURE: 130 MMHG

## 2018-07-27 PROCEDURE — 94729 DIFFUSING CAPACITY: CPT

## 2018-07-27 PROCEDURE — 94060 EVALUATION OF WHEEZING: CPT

## 2018-07-27 PROCEDURE — 99204 OFFICE O/P NEW MOD 45 MIN: CPT | Mod: 25

## 2018-07-27 PROCEDURE — 94727 GAS DIL/WSHOT DETER LNG VOL: CPT

## 2018-07-27 RX ORDER — BLOOD SUGAR DIAGNOSTIC
STRIP MISCELLANEOUS
Qty: 100 | Refills: 0 | Status: ACTIVE | COMMUNITY
Start: 2018-06-01

## 2018-07-27 RX ORDER — LANCETS 28 GAUGE
EACH MISCELLANEOUS
Qty: 100 | Refills: 0 | Status: ACTIVE | COMMUNITY
Start: 2018-06-01

## 2018-07-27 RX ORDER — ASPIRIN 81 MG
81 TABLET, DELAYED RELEASE (ENTERIC COATED) ORAL
Refills: 0 | Status: ACTIVE | COMMUNITY

## 2018-07-27 RX ORDER — ALBUTEROL SULFATE 90 UG/1
108 (90 BASE) AEROSOL, METERED RESPIRATORY (INHALATION)
Qty: 18 | Refills: 0 | Status: ACTIVE | COMMUNITY
Start: 2018-04-19

## 2018-07-27 RX ORDER — FLUOXETINE HYDROCHLORIDE 20 MG/1
20 CAPSULE ORAL
Qty: 30 | Refills: 0 | Status: COMPLETED | COMMUNITY
Start: 2018-02-06

## 2018-07-27 RX ORDER — DONEPEZIL HYDROCHLORIDE 10 MG/1
10 TABLET ORAL
Refills: 0 | Status: ACTIVE | COMMUNITY

## 2018-07-27 RX ORDER — METFORMIN HYDROCHLORIDE 850 MG/1
850 TABLET, COATED ORAL
Qty: 180 | Refills: 0 | Status: ACTIVE | COMMUNITY
Start: 2017-10-24

## 2018-07-27 RX ORDER — INSULIN DETEMIR 100 [IU]/ML
100 INJECTION, SOLUTION SUBCUTANEOUS
Qty: 15 | Refills: 0 | Status: ACTIVE | COMMUNITY
Start: 2018-03-05

## 2018-07-27 RX ORDER — FLUOXETINE HYDROCHLORIDE 20 MG/1
20 CAPSULE ORAL
Qty: 30 | Refills: 0 | Status: COMPLETED | COMMUNITY
Start: 2018-06-18

## 2018-07-27 RX ORDER — TAMSULOSIN HYDROCHLORIDE 0.4 MG/1
0.4 CAPSULE ORAL
Qty: 180 | Refills: 3 | Status: COMPLETED | COMMUNITY
Start: 2017-11-30 | End: 2018-07-27

## 2018-07-27 RX ORDER — CLOPIDOGREL BISULFATE 75 MG/1
75 TABLET, FILM COATED ORAL
Refills: 0 | Status: ACTIVE | COMMUNITY

## 2018-07-27 RX ORDER — ATORVASTATIN CALCIUM 40 MG/1
40 TABLET, FILM COATED ORAL
Qty: 30 | Refills: 0 | Status: ACTIVE | COMMUNITY
Start: 2017-09-20

## 2018-07-27 RX ORDER — CLOPIDOGREL 75 MG/1
75 TABLET, FILM COATED ORAL
Refills: 0 | Status: COMPLETED | COMMUNITY
End: 2018-07-27

## 2018-09-17 DIAGNOSIS — F06.32 MOOD DISORDER DUE TO KNOWN PHYSIOLOGICAL CONDITION WITH MAJOR DEPRESSIVE-LIKE EPISODE: ICD-10-CM

## 2018-11-13 ENCOUNTER — APPOINTMENT (OUTPATIENT)
Dept: GASTROENTEROLOGY | Facility: CLINIC | Age: 73
End: 2018-11-13

## 2018-11-26 ENCOUNTER — APPOINTMENT (OUTPATIENT)
Dept: PULMONOLOGY | Facility: CLINIC | Age: 73
End: 2018-11-26

## 2018-11-30 ENCOUNTER — RESULT REVIEW (OUTPATIENT)
Age: 73
End: 2018-11-30

## 2018-12-17 ENCOUNTER — APPOINTMENT (OUTPATIENT)
Dept: PULMONOLOGY | Facility: CLINIC | Age: 73
End: 2018-12-17
Payer: MEDICARE

## 2018-12-17 VITALS
SYSTOLIC BLOOD PRESSURE: 116 MMHG | OXYGEN SATURATION: 96 % | TEMPERATURE: 98.5 F | HEART RATE: 72 BPM | DIASTOLIC BLOOD PRESSURE: 60 MMHG

## 2018-12-17 PROCEDURE — 99215 OFFICE O/P EST HI 40 MIN: CPT | Mod: 25

## 2018-12-17 PROCEDURE — 94727 GAS DIL/WSHOT DETER LNG VOL: CPT

## 2018-12-17 PROCEDURE — 94060 EVALUATION OF WHEEZING: CPT

## 2018-12-17 PROCEDURE — 94729 DIFFUSING CAPACITY: CPT

## 2019-01-17 ENCOUNTER — APPOINTMENT (OUTPATIENT)
Dept: GASTROENTEROLOGY | Facility: CLINIC | Age: 74
End: 2019-01-17
Payer: MEDICARE

## 2019-01-17 VITALS
WEIGHT: 200 LBS | HEART RATE: 82 BPM | TEMPERATURE: 98.6 F | DIASTOLIC BLOOD PRESSURE: 58 MMHG | BODY MASS INDEX: 27.13 KG/M2 | SYSTOLIC BLOOD PRESSURE: 102 MMHG | OXYGEN SATURATION: 98 %

## 2019-01-17 DIAGNOSIS — K59.00 CONSTIPATION, UNSPECIFIED: ICD-10-CM

## 2019-01-17 DIAGNOSIS — D64.9 ANEMIA, UNSPECIFIED: ICD-10-CM

## 2019-01-17 DIAGNOSIS — K22.70 BARRETT'S ESOPHAGUS W/OUT DYSPLASIA: ICD-10-CM

## 2019-01-17 PROCEDURE — 99214 OFFICE O/P EST MOD 30 MIN: CPT

## 2019-01-17 NOTE — HISTORY OF PRESENT ILLNESS
[de-identified] : \par \par 73 year old timothy álvarezs for follow up. Now with new onset anemia. Bone marrow biopsy did not shows etiology. Colonscopy done last  year shows polyps. EGD in 2017 showed no dysplasia but was positive for Barretts esophagsu .

## 2019-01-17 NOTE — ASSESSMENT
[FreeTextEntry1] : 73 year old male with barretts esophagus , chronic cosntiation, now with new onset anemia. \par \par \par Plan:\par Anemia :\par Schedule for capsule endoscopy \par FOBT \par CBC and iron  studies today \par \par Clarke's esophagus \par Repeat ENdoscopy for surveillance in 2 years \par \par Constipation \par Continue Linzess \par \par

## 2019-01-17 NOTE — PHYSICAL EXAM
[General Appearance - Alert] : alert [General Appearance - In No Acute Distress] : in no acute distress [Auscultation Breath Sounds / Voice Sounds] : lungs were clear to auscultation bilaterally [Heart Rate And Rhythm] : heart rate was normal and rhythm regular [Heart Sounds] : normal S1 and S2 [Heart Sounds Gallop] : no gallops [Murmurs] : no murmurs [Heart Sounds Pericardial Friction Rub] : no pericardial rub [Bowel Sounds] : normal bowel sounds [Abdomen Soft] : soft [Abdomen Tenderness] : non-tender [Abdomen Mass (___ Cm)] : no abdominal mass palpated [Cervical Lymph Nodes Enlarged Posterior Bilaterally] : posterior cervical [Cervical Lymph Nodes Enlarged Anterior Bilaterally] : anterior cervical [Supraclavicular Lymph Nodes Enlarged Bilaterally] : supraclavicular [No Spinal Tenderness] : no spinal tenderness [Nail Clubbing] : no clubbing  or cyanosis of the fingernails [] : no rash [Oriented To Time, Place, And Person] : oriented to person, place, and time [Impaired Insight] : insight and judgment were intact [Affect] : the affect was normal

## 2019-02-14 LAB — HEMOCCULT STL QL IA: NEGATIVE

## 2019-06-14 ENCOUNTER — APPOINTMENT (OUTPATIENT)
Dept: VASCULAR SURGERY | Facility: CLINIC | Age: 74
End: 2019-06-14
Payer: MEDICARE

## 2019-06-14 VITALS
HEART RATE: 77 BPM | TEMPERATURE: 98 F | SYSTOLIC BLOOD PRESSURE: 118 MMHG | BODY MASS INDEX: 27.09 KG/M2 | HEIGHT: 72 IN | DIASTOLIC BLOOD PRESSURE: 62 MMHG | WEIGHT: 200 LBS

## 2019-06-14 PROCEDURE — 93923 UPR/LXTR ART STDY 3+ LVLS: CPT

## 2019-06-14 PROCEDURE — 99204 OFFICE O/P NEW MOD 45 MIN: CPT

## 2019-06-14 RX ORDER — PEN NEEDLE, DIABETIC 31 G X1/4"
31G X 6 MM NEEDLE, DISPOSABLE MISCELLANEOUS
Qty: 100 | Refills: 0 | Status: ACTIVE | COMMUNITY
Start: 2018-10-11

## 2019-06-14 NOTE — PHYSICAL EXAM
[Normal Heart Sounds] : normal heart sounds [Normal Breath Sounds] : Normal breath sounds [JVD] : no jugular venous distention  [2+] : left 2+ [Right Carotid Bruit] : right carotid bruit heard [Ankle Swelling (On Exam)] : not present [0] : left 0 [Varicose Veins Of Lower Extremities] : not present [Skin Ulcer] : ulcer [Abdomen Masses] : No abdominal masses [] : not present [de-identified] : The right second toe with discoloration with superficial ulcerations at the PIP joint

## 2019-06-14 NOTE — CONSULT LETTER
[Consult Letter:] : I had the pleasure of evaluating your patient, [unfilled]. [Dear  ___] : Dear  [unfilled], [Please see my note below.] : Please see my note below. [Sincerely,] : Sincerely, [Consult Closing:] : Thank you very much for allowing me to participate in the care of this patient.  If you have any questions, please do not hesitate to contact me. [FreeTextEntry3] : Gregor Trevino M.D., F.MARYANN.S., R.P.V.I.\par  of Vascular Surgery\par Chief, Vascular Surgery at [facility]\par Chief, Endovascular Surgery at [facility]\par Medical Director of Endovascular Program\par [facility]\par

## 2019-06-14 NOTE — HISTORY OF PRESENT ILLNESS
[FreeTextEntry1] : Patient is a 73-year-old male with past medical history significant for diabetes, hypertension, history of CVA x3 who is wheelchair-bound with minimal ambulation presenting to us for evaluation of right second and third toe discoloration with superficial ulceration. No complaint and history of fevers or chills. No significant rest pain all claudication symptoms even though the patient suffers from neuropathy. Patient is a former smoker.

## 2019-06-14 NOTE — ASSESSMENT
[FreeTextEntry1] : Patient with mild to moderate peripheral vascular disease with healing wound of the right second toe. Continue local wound care. Followup in 2 weeks for carotid duplex and further evaluation and progress of the ulcer. If the ulcers not healing properly or getting bigger at that point would recommend angiogram of bilateral lower extremities

## 2019-06-25 ENCOUNTER — OTHER (OUTPATIENT)
Age: 74
End: 2019-06-25

## 2019-06-25 RX ORDER — LINACLOTIDE 290 UG/1
290 CAPSULE, GELATIN COATED ORAL
Qty: 30 | Refills: 5 | Status: ACTIVE | COMMUNITY
Start: 2017-11-28 | End: 1900-01-01

## 2019-08-02 ENCOUNTER — APPOINTMENT (OUTPATIENT)
Dept: VASCULAR SURGERY | Facility: CLINIC | Age: 74
End: 2019-08-02
Payer: MEDICARE

## 2019-08-02 PROCEDURE — 99214 OFFICE O/P EST MOD 30 MIN: CPT

## 2019-08-02 PROCEDURE — 93880 EXTRACRANIAL BILAT STUDY: CPT

## 2019-08-02 NOTE — ASSESSMENT
[FreeTextEntry1] : Patient with mild to moderate peripheral vascular disease with healing wound of the right second toe. Continue local wound care. \par Wound healing nicely \par Mild to moderate asymptomatic carotid disease.

## 2019-08-02 NOTE — PHYSICAL EXAM
[JVD] : no jugular venous distention  [Normal Breath Sounds] : Normal breath sounds [Normal Heart Sounds] : normal heart sounds [Right Carotid Bruit] : right carotid bruit heard [2+] : left 2+ [Ankle Swelling (On Exam)] : not present [0] : left 0 [] : not present [Abdomen Masses] : No abdominal masses [Varicose Veins Of Lower Extremities] : not present [Skin Ulcer] : ulcer [de-identified] : The right second toe with discoloration with superficial ulcerations at the PIP joint

## 2019-10-28 ENCOUNTER — APPOINTMENT (OUTPATIENT)
Dept: UROLOGY | Facility: CLINIC | Age: 74
End: 2019-10-28

## 2020-01-23 ENCOUNTER — APPOINTMENT (OUTPATIENT)
Dept: UROLOGY | Facility: CLINIC | Age: 75
End: 2020-01-23
Payer: MEDICARE

## 2020-01-23 VITALS
TEMPERATURE: 98.6 F | HEART RATE: 73 BPM | RESPIRATION RATE: 16 BRPM | HEIGHT: 72 IN | SYSTOLIC BLOOD PRESSURE: 117 MMHG | BODY MASS INDEX: 23.98 KG/M2 | WEIGHT: 177 LBS | DIASTOLIC BLOOD PRESSURE: 67 MMHG

## 2020-01-23 DIAGNOSIS — N13.8 BENIGN PROSTATIC HYPERPLASIA WITH LOWER URINARY TRACT SYMPMS: ICD-10-CM

## 2020-01-23 DIAGNOSIS — G81.94 HEMIPLEGIA, UNSPECIFIED AFFECTING LEFT NONDOMINANT SIDE: ICD-10-CM

## 2020-01-23 DIAGNOSIS — N40.1 BENIGN PROSTATIC HYPERPLASIA WITH LOWER URINARY TRACT SYMPMS: ICD-10-CM

## 2020-01-23 PROCEDURE — 51798 US URINE CAPACITY MEASURE: CPT

## 2020-01-23 PROCEDURE — 99213 OFFICE O/P EST LOW 20 MIN: CPT | Mod: 25

## 2020-01-23 RX ORDER — ROSUVASTATIN CALCIUM 40 MG/1
40 TABLET, FILM COATED ORAL
Qty: 30 | Refills: 0 | Status: DISCONTINUED | COMMUNITY
Start: 2019-10-23

## 2020-01-23 RX ORDER — METOPROLOL TARTRATE 25 MG/1
25 TABLET, FILM COATED ORAL
Qty: 60 | Refills: 0 | Status: DISCONTINUED | COMMUNITY
Start: 2019-10-23

## 2020-01-23 RX ORDER — FINASTERIDE 5 MG/1
5 TABLET, FILM COATED ORAL DAILY
Qty: 90 | Refills: 3 | Status: ACTIVE | COMMUNITY
Start: 2017-11-30 | End: 1900-01-01

## 2020-01-23 RX ORDER — FUROSEMIDE 40 MG/1
40 TABLET ORAL
Qty: 30 | Refills: 0 | Status: DISCONTINUED | COMMUNITY
Start: 2019-10-23

## 2020-01-23 RX ORDER — TAMSULOSIN HYDROCHLORIDE 0.4 MG/1
0.4 CAPSULE ORAL
Qty: 180 | Refills: 3 | Status: ACTIVE | COMMUNITY
Start: 2017-10-09 | End: 1900-01-01

## 2020-01-23 RX ORDER — ASPIRIN 81 MG/1
81 TABLET, COATED ORAL
Qty: 30 | Refills: 0 | Status: DISCONTINUED | COMMUNITY
Start: 2019-10-23

## 2020-01-23 NOTE — LETTER BODY
[FreeTextEntry1] : Bola Veronica MD\par [address]\[place] [address]\par [phone]\par \par Malathi Yeung MD\par [address] [facility]\[place] [address]\par [phone]\par \par Dear Dr. Veronica and Dr. Yeung,\par \par Reason for Visit: BPH.\par \par This is a 72 year-old retired male  with history of stroke, resulting in left hemiparesis, presenting with chronic BPH. Patient returns today for follow up, accompanied by his wife, who helped contribute to his symptoms. Since his last visit, the patient reports he continues to take Flomax and Proscar, decreasing Flomax intake to once a day. He reports taking medication regularly without any side effects or difficulties. He complains of weak uroflow, urinary frequency, and urge incontinence despite medical therapy. He also complains of nocturia every 2 hours. He currently uses incontinence pads. He does not have a catheter in place. Patient denies any urinary retention or hematuria. He denies any changes in health. Patient has no pain. The past medical history and family history and social history are unchanged. All other review of systems are negative. Patient denies any changes in medications. Medication list was reconciled. \par \par On examination, the patient is an older-appearing wheelchair-bound gentleman in no acute distress. He is alert and oriented follows commands. He has normal mood and affect. He is normocephalic. Oral no thrush. Neck is supple. Respirations are unlabored. His abdomen is soft and nontender. Liver is nonpalpable. Bladder is nonpalpable. No CVA tenderness. Neurologically he is grossly intact. No peripheral edema. Skin without gross abnormality. \par \par Random post-void residual on bladder scan today was 304 cc. \par \par Assessment: BPH, symptoms improved with Flomax and Proscar.\par \par I counseled the patient. I again explained to him that his symptoms are likely attributed to the residual effects of his stroke. I encouraged him to continue Proscar and to increase Flomax to BID to relieve his symptoms. I discussed his treatment options of Cruz catheterization or condom catheterization so that he does not have to worry about finding a bathroom when out in public, or getting up in the middle of the night and disrupting his sleep. I counseled the patient regarding each treatment option. The risks and benefits were discussed. Alternatives were given. I answered the patient questions. The patient declines Cruz catheterization and will try condom catheterization. However, he was unable to stay for education and instruction on condom catheterization, and has rescheduled for another time. The patient will follow-up as directed and will contact me with any questions or concerns. Thank you for the opportunity to participate in the care of Mr. TAMAYO. I will keep you updated on his progress. \par \par Plan: Continue Proscar. Increase Flomax to BID. Condom catheterization. Follow up as directed.

## 2020-01-23 NOTE — ADDENDUM
[FreeTextEntry1] : Entered by Wesley Hicks, acting as scribe for Dr. Casey Ross.\par \par The documentation recorded by the scribe accurately reflects the service I personally performed and the decisions made by me.

## 2020-01-26 PROBLEM — N40.1 PROSTATE HYPERPLASIA, BENIGN LOCALIZED, WITH URINARY OBSTRUCTION: Status: ACTIVE | Noted: 2017-10-09

## 2020-01-26 PROBLEM — G81.94 LEFT HEMIPARESIS: Status: ACTIVE | Noted: 2017-10-09

## 2020-01-27 ENCOUNTER — INPATIENT (INPATIENT)
Facility: HOSPITAL | Age: 75
LOS: 3 days | Discharge: NURSING, REHAB OR LONG-TERM CARE | DRG: 811 | End: 2020-01-31
Attending: INTERNAL MEDICINE | Admitting: INTERNAL MEDICINE
Payer: COMMERCIAL

## 2020-01-27 VITALS
WEIGHT: 175.05 LBS | HEART RATE: 69 BPM | SYSTOLIC BLOOD PRESSURE: 114 MMHG | TEMPERATURE: 97 F | DIASTOLIC BLOOD PRESSURE: 67 MMHG | HEIGHT: 72 IN | RESPIRATION RATE: 18 BRPM | OXYGEN SATURATION: 88 %

## 2020-01-27 DIAGNOSIS — R41.82 ALTERED MENTAL STATUS, UNSPECIFIED: ICD-10-CM

## 2020-01-27 LAB
ALBUMIN SERPL ELPH-MCNC: 4.2 G/DL — SIGNIFICANT CHANGE UP (ref 3.5–5)
ALP SERPL-CCNC: 114 U/L — SIGNIFICANT CHANGE UP (ref 40–120)
ALT FLD-CCNC: 58 U/L DA — SIGNIFICANT CHANGE UP (ref 10–60)
ANION GAP SERPL CALC-SCNC: 6 MMOL/L — SIGNIFICANT CHANGE UP (ref 5–17)
APPEARANCE UR: CLEAR — SIGNIFICANT CHANGE UP
APTT BLD: 35.4 SEC — SIGNIFICANT CHANGE UP (ref 27.5–36.3)
AST SERPL-CCNC: 26 U/L — SIGNIFICANT CHANGE UP (ref 10–40)
BASOPHILS # BLD AUTO: 0.06 K/UL — SIGNIFICANT CHANGE UP (ref 0–0.2)
BASOPHILS NFR BLD AUTO: 1 % — SIGNIFICANT CHANGE UP (ref 0–2)
BILIRUB SERPL-MCNC: 0.4 MG/DL — SIGNIFICANT CHANGE UP (ref 0.2–1.2)
BILIRUB UR-MCNC: NEGATIVE — SIGNIFICANT CHANGE UP
BUN SERPL-MCNC: 20 MG/DL — HIGH (ref 7–18)
CALCIUM SERPL-MCNC: 9.7 MG/DL — SIGNIFICANT CHANGE UP (ref 8.4–10.5)
CHLORIDE SERPL-SCNC: 104 MMOL/L — SIGNIFICANT CHANGE UP (ref 96–108)
CO2 SERPL-SCNC: 30 MMOL/L — SIGNIFICANT CHANGE UP (ref 22–31)
COLOR SPEC: YELLOW — SIGNIFICANT CHANGE UP
CREAT SERPL-MCNC: 1.39 MG/DL — HIGH (ref 0.5–1.3)
DIFF PNL FLD: NEGATIVE — SIGNIFICANT CHANGE UP
EOSINOPHIL # BLD AUTO: 0.17 K/UL — SIGNIFICANT CHANGE UP (ref 0–0.5)
EOSINOPHIL NFR BLD AUTO: 2.7 % — SIGNIFICANT CHANGE UP (ref 0–6)
GLUCOSE SERPL-MCNC: 127 MG/DL — HIGH (ref 70–99)
GLUCOSE UR QL: NEGATIVE — SIGNIFICANT CHANGE UP
HCT VFR BLD CALC: 34.2 % — LOW (ref 39–50)
HGB BLD-MCNC: 10.6 G/DL — LOW (ref 13–17)
IMM GRANULOCYTES NFR BLD AUTO: 0.3 % — SIGNIFICANT CHANGE UP (ref 0–1.5)
INR BLD: 1.05 RATIO — SIGNIFICANT CHANGE UP (ref 0.88–1.16)
KETONES UR-MCNC: NEGATIVE — SIGNIFICANT CHANGE UP
LEUKOCYTE ESTERASE UR-ACNC: NEGATIVE — SIGNIFICANT CHANGE UP
LYMPHOCYTES # BLD AUTO: 0.94 K/UL — LOW (ref 1–3.3)
LYMPHOCYTES # BLD AUTO: 15 % — SIGNIFICANT CHANGE UP (ref 13–44)
MCHC RBC-ENTMCNC: 29.8 PG — SIGNIFICANT CHANGE UP (ref 27–34)
MCHC RBC-ENTMCNC: 31 GM/DL — LOW (ref 32–36)
MCV RBC AUTO: 96.1 FL — SIGNIFICANT CHANGE UP (ref 80–100)
MONOCYTES # BLD AUTO: 0.36 K/UL — SIGNIFICANT CHANGE UP (ref 0–0.9)
MONOCYTES NFR BLD AUTO: 5.7 % — SIGNIFICANT CHANGE UP (ref 2–14)
NEUTROPHILS # BLD AUTO: 4.73 K/UL — SIGNIFICANT CHANGE UP (ref 1.8–7.4)
NEUTROPHILS NFR BLD AUTO: 75.3 % — SIGNIFICANT CHANGE UP (ref 43–77)
NITRITE UR-MCNC: NEGATIVE — SIGNIFICANT CHANGE UP
NRBC # BLD: 0 /100 WBCS — SIGNIFICANT CHANGE UP (ref 0–0)
OB PNL STL: NEGATIVE — SIGNIFICANT CHANGE UP
PH UR: 5 — SIGNIFICANT CHANGE UP (ref 5–8)
PLATELET # BLD AUTO: 257 K/UL — SIGNIFICANT CHANGE UP (ref 150–400)
POTASSIUM SERPL-MCNC: 4 MMOL/L — SIGNIFICANT CHANGE UP (ref 3.5–5.3)
POTASSIUM SERPL-SCNC: 4 MMOL/L — SIGNIFICANT CHANGE UP (ref 3.5–5.3)
PROT SERPL-MCNC: 8.8 G/DL — HIGH (ref 6–8.3)
PROT UR-MCNC: 100
PROTHROM AB SERPL-ACNC: 11.7 SEC — SIGNIFICANT CHANGE UP (ref 10–12.9)
RBC # BLD: 3.56 M/UL — LOW (ref 4.2–5.8)
RBC # FLD: 15.4 % — HIGH (ref 10.3–14.5)
SODIUM SERPL-SCNC: 140 MMOL/L — SIGNIFICANT CHANGE UP (ref 135–145)
SP GR SPEC: 1.01 — SIGNIFICANT CHANGE UP (ref 1.01–1.02)
UROBILINOGEN FLD QL: NEGATIVE — SIGNIFICANT CHANGE UP
WBC # BLD: 6.28 K/UL — SIGNIFICANT CHANGE UP (ref 3.8–10.5)
WBC # FLD AUTO: 6.28 K/UL — SIGNIFICANT CHANGE UP (ref 3.8–10.5)

## 2020-01-27 PROCEDURE — 70450 CT HEAD/BRAIN W/O DYE: CPT | Mod: 26

## 2020-01-27 PROCEDURE — 99232 SBSQ HOSP IP/OBS MODERATE 35: CPT

## 2020-01-27 PROCEDURE — 99285 EMERGENCY DEPT VISIT HI MDM: CPT

## 2020-01-27 PROCEDURE — 71045 X-RAY EXAM CHEST 1 VIEW: CPT | Mod: 26

## 2020-01-27 RX ORDER — LEVALBUTEROL 1.25 MG/.5ML
3 SOLUTION, CONCENTRATE RESPIRATORY (INHALATION)
Qty: 0 | Refills: 0 | DISCHARGE

## 2020-01-27 RX ORDER — SITAGLIPTIN 50 MG/1
1 TABLET, FILM COATED ORAL
Qty: 0 | Refills: 0 | DISCHARGE

## 2020-01-27 RX ORDER — SODIUM CHLORIDE 9 MG/ML
250 INJECTION INTRAMUSCULAR; INTRAVENOUS; SUBCUTANEOUS
Refills: 0 | Status: DISCONTINUED | OUTPATIENT
Start: 2020-01-27 | End: 2020-01-28

## 2020-01-27 RX ORDER — ASPIRIN/CALCIUM CARB/MAGNESIUM 324 MG
1 TABLET ORAL
Qty: 0 | Refills: 0 | DISCHARGE

## 2020-01-27 RX ORDER — METOPROLOL TARTRATE 50 MG
1 TABLET ORAL
Qty: 0 | Refills: 0 | DISCHARGE

## 2020-01-27 RX ORDER — FAMOTIDINE 10 MG/ML
1 INJECTION INTRAVENOUS
Qty: 0 | Refills: 0 | DISCHARGE

## 2020-01-27 RX ORDER — L.ACIDOPH/B.ANIMALIS/B.LONGUM 15B CELL
1 CAPSULE ORAL
Qty: 0 | Refills: 0 | DISCHARGE

## 2020-01-27 NOTE — H&P ADULT - ASSESSMENT
Patient is a 74 year old male, with PMH of legally blind, dementia, CHF, DM, HTN, depression and BPH, who comes in from home with his wife after being told his hemoglobin dropped from 10.8 to 7.2 and drop in hematocrit from 34 to 23.6.     FOBT negative.  UA negative.   CT head showing chronic ischemic changes. No acute changes compared to prior CT.   Noted to have mild elevation in creatinine of 1.39.   No leukocytosis; afebrile     Patient admitted for lethargy work up.

## 2020-01-27 NOTE — H&P ADULT - PROBLEM SELECTOR PLAN 10
discussed with wife at bedside about patient's goals of care  wife states that the patient is DNR/DNI  MOLST form signed and placed in chart

## 2020-01-27 NOTE — H&P ADULT - PROBLEM SELECTOR PLAN 8
IMPROVE VTE Individual Risk Assessment  RISK                                                                Points  [  ] Previous VTE                                                  3  [  ] Thrombophilia                                               2  [  ] Lower limb paralysis                                      2        (unable to hold up >15 seconds)    [  ] Current Cancer                                              2         (within 6 months)  [X ] Immobilization > 24 hrs                                1  [  ] ICU/CCU stay > 24 hours                              1  [X ] Age > 60                                                      1    IMPROVE VTE Score  2    heparin for DVT prophylaxis patient takes plavix 75mg, aspirin 81mg and metoprolol 25mg  will continue home meds  f/u ECHO

## 2020-01-27 NOTE — H&P ADULT - PROBLEM SELECTOR PLAN 4
patient takes rosuvastatin 40mg at home  will continue atorvastatin 80mg here  f/u lipid profile patient takes rosuvastatin 40mg at home  will continue atorvastatin 80mg   f/u lipid profile patient has history of DM  takes metformin and Levemir 9 units at bedtime as home meds  will place on lantus 9 units at bedtime and SSI   f/u Hgb A1c  monitor blood sugars

## 2020-01-27 NOTE — H&P ADULT - PROBLEM SELECTOR PLAN 2
noted to have elevated creatinine of 1.39 on admission  likely due to poor PO intake and dehydration vs post obstructive from BPH  will give 250cc IVF   monitor BMP noted to have elevated creatinine of 1.39 on admission  likely due to poor PO intake and dehydration vs post obstructive from BPH  will give 250cc IVF   monitor BMP  f/u urine lytes as per wife, patient has been lethargic for the past 2-3 months  may be due to worsening dementia vs polypharmacy   monitor vitals and f/u AM CBC

## 2020-01-27 NOTE — H&P ADULT - HISTORY OF PRESENT ILLNESS
Patient is a 74 year old male, with PMH of legally blind, dementia, CHF, DM, HTN, depression and BPH, who comes in from home with his wife after being told his hemoglobin dropped from 10.8 to 7.2 and drop in hematocrit from 34 to 23.6. Patient went to PCP on Friday and had labs done and was told about results and was advised to come to the ED. Wife present at bedside also stated that patient has been lethargic for the past 2-3 months. Of note, patient had EGD done this past September and also a colonoscopy about 2 years ago which were both normal. Patient denies any chest pain, headache, dizziness, nausea, vomiting, abdominal pain. No signs of bleeding noted as per wife. Patient also was recently discharged from subacute rehab last month. Patient is a 74 year old male, with PMH of legally blind, dementia, CHF, DM, HTN, depression, CVA with residual left sided deficits and BPH, who comes in from home with his wife after being told his hemoglobin dropped from 10.8 to 7.2 and drop in hematocrit from 34 to 23.6. Patient went to PCP on Friday and had labs done and was told about results and was advised to come to the ED. Wife present at bedside also stated that patient has been lethargic for the past 2-3 months. Of note, patient had EGD done this past September and also a colonoscopy about 2 years ago which were both normal. Patient denies any chest pain, headache, dizziness, nausea, vomiting, abdominal pain. No signs of bleeding noted as per wife. Patient also was recently discharged from subacute rehab last month.

## 2020-01-27 NOTE — ED PROVIDER NOTE - CONSTITUTIONAL MENTATION
ALTERED LOC/sleeping, awakens to voice. oriented to person and place. Knows month and year but not day. Cheiloplasty (Less Than 50%) Text: A decision was made to reconstruct the defect with a  cheiloplasty.  The defect was undermined extensively.  Additional obicularis oris muscle was excised with a 15 blade scalpel.  The defect was converted into a full thickness wedge, of less than 50% of the vertical height of the lip, to facilite a better cosmetic result.  Small vessels were then tied off with 5-0 monocyrl. The obicularis oris, superficial fascia, adipose and dermis were then reapproximated.  After the deeper layers were approximated the epidermis was reapproximated with particular care given to realign the vermilion border.

## 2020-01-27 NOTE — H&P ADULT - PROBLEM SELECTOR PLAN 9
discussed with wife at bedside about patient's goals of care  wife states that the patient is DNR/DNI IMPROVE VTE Individual Risk Assessment  RISK                                                                Points  [  ] Previous VTE                                                  3  [  ] Thrombophilia                                               2  [  ] Lower limb paralysis                                      2        (unable to hold up >15 seconds)    [  ] Current Cancer                                              2         (within 6 months)  [X ] Immobilization > 24 hrs                                1  [  ] ICU/CCU stay > 24 hours                              1  [X ] Age > 60                                                      1    IMPROVE VTE Score  2    heparin for DVT prophylaxis

## 2020-01-27 NOTE — H&P ADULT - ATTENDING COMMENTS
Pt seen and examined. Case discussed with Housestaff.  74 year old man with multiple PMH including CVA with residual left sided deficits, DM2, CHF, BPH sent in by PCP for low hgb level. Of note pt has had multiple ongoing issues with worsening cognition, muscle weakness, lethargy , urinary retention for which he gets regular follow up with his PCP, neurologist and urologist.   As part of his recent PCP visit work up, he had a CBC which showed a hg level of 7 from his baseline fo 10.2. He was sent initially to go see a g'enterolgist and hematologist but wife decided to bring him to the ED.  The repeat cbc in the ED was noted to be 10.6. The pt had basic work up that were unremarkable including a CT head. The pt was scheduled for admission. After my evaluation and noting the chronicity of patient's symptoms and the close follow up he had with his physicians, my plan was to discharge from the ED after repeating the hgb and confirming the earlier values. The cbc however returned with hgb as 7.7.   At this point, pt was admitted for evaluation of this sinusoidal hgb level. Pt remain stable with no additional symptoms.                          7.7    6.05  )-----------( 211      ( 28 Jan 2020 00:07 )             24.0     01-27    140  |  104  |  20<H>  ----------------------------<  127<H>  4.0   |  30  |  1.39<H>    Ca    9.7      27 Jan 2020 14:14    TPro  8.8<H>  /  Alb  4.2  /  TBili  0.4  /  DBili  x   /  AST  26  /  ALT  58  /  AlkPhos  114  01-27    CT head - no acute findings.    Impression  74 year old man with hx as above with unexplainable acute varying changes in his hemoglobin level - varying between 7g and 10g/dL in short periods.    A/P  - Suspected acute drop in hgb level of unclear significance or etiology  - CVA with left side deficits  - DM2  - BPH  - HTN    Plan  - Admit to Medicine  - repeat multiple hgb level / blood smear  - Further intervention based on findings of cbc repeats  - resume home med

## 2020-01-27 NOTE — ED PROVIDER NOTE - OBJECTIVE STATEMENT
c/o nausea upon returning from CT but child is now sleeping Patient's wife reports patient's health has been declining gradually for the past 2-3 months. Most recently discharged from nursing home on 12/27/19. For the past 2 weeks, he has been lethargic and has started stuttering. Patient denies ha, cp, sob, ap, n/v/d, focal weakness, paresthesias. PMD check blood tests and found patient was anemic. Sent to ED for further evaluation.

## 2020-01-27 NOTE — H&P ADULT - PROBLEM SELECTOR PLAN 1
patient was told hemoglobin dropped from 10.8 to 7.2 by PCP   Hgb in ED noted to be 10.6; around baseline for patient   f/u repeat CBC  f/u anemia panel patient was told hemoglobin dropped from 10.8 to 7.2 by PCP   Hgb in ED noted to be 10.6; around baseline for patient   repeat CBC showed Hgb to be 7.7.   CT head negative for acute changes  FOBT negative  f/u anemia panel  f/u AM CBC  blood transfusion consent in chart if needed

## 2020-01-27 NOTE — H&P ADULT - PROBLEM SELECTOR PLAN 7
patient takes plavix 75mg, aspirin 81mg and metoprolol 25mg  will continue home meds patient takes plavix 75mg, aspirin 81mg and metoprolol 25mg  will continue home meds  f/u ECHO patient takes aricept 10mg at home  will continue home meds

## 2020-01-27 NOTE — H&P ADULT - PROBLEM SELECTOR PLAN 3
patient has history of DM  takes metformin and Levemir at bedtime as home meds  will continue levemir and SSI   f/u Hgb A1c  monitor blood sugars patient has history of DM  takes metformin and Levemir at bedtime as home meds  will place on lantus 9 units at bedtime and SSI   f/u Hgb A1c  monitor blood sugars noted to have elevated creatinine of 1.39 on admission  likely due to poor PO intake and dehydration vs post obstructive from BPH  will give 250cc IVF   monitor BMP  f/u urine lytes

## 2020-01-27 NOTE — H&P ADULT - PROBLEM SELECTOR PLAN 6
patient takes aricept 10mg at home  will continue home meds patient takes finasteride 5mg and flomax at home  will continue with home meds

## 2020-01-27 NOTE — H&P ADULT - NSHPSOCIALHISTORY_GEN_ALL_CORE
Patient lives at home with his wife. Patient is a former smoker and smoked 1ppd for 20 years and quit 10 years ago. Patient used to smoke marijuana but currently does not and denies any use of illicit drugs. Denies any alcohol use.

## 2020-01-27 NOTE — ED PROVIDER NOTE - PROGRESS NOTE DETAILS
Patient is resting comfortably, NAD. Patient told me that he had testicular pain that resolved after he urinated. I examined external genitalia. No testicular swelling or tenderness. normal lie. penis normal. no skin lesions. No evidence of testicular torsion or epididymitis. Patient signed out to Dr. Prieto. Pending hemoccult, CT head, UA. Plan is to admit. CT unremarkable admit to medicine for lethargy workup

## 2020-01-27 NOTE — H&P ADULT - PROBLEM SELECTOR PLAN 5
patient takes finasteride 5mg and flomax at home  will continue with home meds patient takes rosuvastatin 40mg at home  will continue atorvastatin 80mg   f/u lipid profile

## 2020-01-27 NOTE — H&P ADULT - NSICDXPASTMEDICALHX_GEN_ALL_CORE_FT
PAST MEDICAL HISTORY:  BPH (benign prostatic hyperplasia)     CVA (cerebral vascular accident)     Depression     Diabetes     Hyperlipidemia     Legally blind

## 2020-01-28 DIAGNOSIS — N17.9 ACUTE KIDNEY FAILURE, UNSPECIFIED: ICD-10-CM

## 2020-01-28 DIAGNOSIS — R53.83 OTHER FATIGUE: ICD-10-CM

## 2020-01-28 DIAGNOSIS — I25.10 ATHEROSCLEROTIC HEART DISEASE OF NATIVE CORONARY ARTERY WITHOUT ANGINA PECTORIS: ICD-10-CM

## 2020-01-28 DIAGNOSIS — Z29.9 ENCOUNTER FOR PROPHYLACTIC MEASURES, UNSPECIFIED: ICD-10-CM

## 2020-01-28 DIAGNOSIS — D64.9 ANEMIA, UNSPECIFIED: ICD-10-CM

## 2020-01-28 DIAGNOSIS — R41.82 ALTERED MENTAL STATUS, UNSPECIFIED: ICD-10-CM

## 2020-01-28 DIAGNOSIS — Z71.89 OTHER SPECIFIED COUNSELING: ICD-10-CM

## 2020-01-28 DIAGNOSIS — E78.5 HYPERLIPIDEMIA, UNSPECIFIED: ICD-10-CM

## 2020-01-28 DIAGNOSIS — N40.0 BENIGN PROSTATIC HYPERPLASIA WITHOUT LOWER URINARY TRACT SYMPTOMS: ICD-10-CM

## 2020-01-28 DIAGNOSIS — E11.9 TYPE 2 DIABETES MELLITUS WITHOUT COMPLICATIONS: ICD-10-CM

## 2020-01-28 DIAGNOSIS — F32.9 MAJOR DEPRESSIVE DISORDER, SINGLE EPISODE, UNSPECIFIED: ICD-10-CM

## 2020-01-28 DIAGNOSIS — I50.9 HEART FAILURE, UNSPECIFIED: ICD-10-CM

## 2020-01-28 LAB
24R-OH-CALCIDIOL SERPL-MCNC: 29.7 NG/ML — LOW (ref 30–80)
ANION GAP SERPL CALC-SCNC: 5 MMOL/L — SIGNIFICANT CHANGE UP (ref 5–17)
BASOPHILS # BLD AUTO: 0.04 K/UL — SIGNIFICANT CHANGE UP (ref 0–0.2)
BASOPHILS NFR BLD AUTO: 0.7 % — SIGNIFICANT CHANGE UP (ref 0–2)
BUN SERPL-MCNC: 22 MG/DL — HIGH (ref 7–18)
CALCIUM SERPL-MCNC: 8.6 MG/DL — SIGNIFICANT CHANGE UP (ref 8.4–10.5)
CHLORIDE SERPL-SCNC: 109 MMOL/L — HIGH (ref 96–108)
CHOLEST SERPL-MCNC: 94 MG/DL — SIGNIFICANT CHANGE UP (ref 10–199)
CO2 SERPL-SCNC: 26 MMOL/L — SIGNIFICANT CHANGE UP (ref 22–31)
CREAT ?TM UR-MCNC: 61 MG/DL — SIGNIFICANT CHANGE UP
CREAT SERPL-MCNC: 1.2 MG/DL — SIGNIFICANT CHANGE UP (ref 0.5–1.3)
EOSINOPHIL # BLD AUTO: 0.21 K/UL — SIGNIFICANT CHANGE UP (ref 0–0.5)
EOSINOPHIL NFR BLD AUTO: 3.6 % — SIGNIFICANT CHANGE UP (ref 0–6)
FERRITIN SERPL-MCNC: 148 NG/ML — SIGNIFICANT CHANGE UP (ref 30–400)
FOLATE SERPL-MCNC: 18.3 NG/ML — SIGNIFICANT CHANGE UP
GLUCOSE BLDC GLUCOMTR-MCNC: 108 MG/DL — HIGH (ref 70–99)
GLUCOSE BLDC GLUCOMTR-MCNC: 110 MG/DL — HIGH (ref 70–99)
GLUCOSE BLDC GLUCOMTR-MCNC: 128 MG/DL — HIGH (ref 70–99)
GLUCOSE BLDC GLUCOMTR-MCNC: 129 MG/DL — HIGH (ref 70–99)
GLUCOSE SERPL-MCNC: 105 MG/DL — HIGH (ref 70–99)
HAPTOGLOB SERPL-MCNC: 210 MG/DL — HIGH (ref 34–200)
HBA1C BLD-MCNC: 6.8 % — HIGH (ref 4–5.6)
HCT VFR BLD CALC: 24 % — LOW (ref 39–50)
HCT VFR BLD CALC: 25.1 % — LOW (ref 39–50)
HCV AB S/CO SERPL IA: 0.21 S/CO — SIGNIFICANT CHANGE UP (ref 0–0.99)
HCV AB SERPL-IMP: SIGNIFICANT CHANGE UP
HDLC SERPL-MCNC: 53 MG/DL — SIGNIFICANT CHANGE UP
HGB BLD-MCNC: 7.6 G/DL — LOW (ref 13–17)
HGB BLD-MCNC: 7.7 G/DL — LOW (ref 13–17)
IMM GRANULOCYTES NFR BLD AUTO: 0.2 % — SIGNIFICANT CHANGE UP (ref 0–1.5)
IRON SATN MFR SERPL: 17 % — LOW (ref 20–55)
IRON SATN MFR SERPL: 40 UG/DL — LOW (ref 65–170)
LDH SERPL L TO P-CCNC: 211 U/L — SIGNIFICANT CHANGE UP (ref 120–225)
LIPID PNL WITH DIRECT LDL SERPL: 28 MG/DL — SIGNIFICANT CHANGE UP
LYMPHOCYTES # BLD AUTO: 0.98 K/UL — LOW (ref 1–3.3)
LYMPHOCYTES # BLD AUTO: 16.9 % — SIGNIFICANT CHANGE UP (ref 13–44)
MAGNESIUM SERPL-MCNC: 2 MG/DL — SIGNIFICANT CHANGE UP (ref 1.6–2.6)
MCHC RBC-ENTMCNC: 29.1 PG — SIGNIFICANT CHANGE UP (ref 27–34)
MCHC RBC-ENTMCNC: 30.3 GM/DL — LOW (ref 32–36)
MCHC RBC-ENTMCNC: 30.6 PG — SIGNIFICANT CHANGE UP (ref 27–34)
MCHC RBC-ENTMCNC: 32.1 GM/DL — SIGNIFICANT CHANGE UP (ref 32–36)
MCV RBC AUTO: 95.2 FL — SIGNIFICANT CHANGE UP (ref 80–100)
MCV RBC AUTO: 96.2 FL — SIGNIFICANT CHANGE UP (ref 80–100)
MONOCYTES # BLD AUTO: 0.44 K/UL — SIGNIFICANT CHANGE UP (ref 0–0.9)
MONOCYTES NFR BLD AUTO: 7.6 % — SIGNIFICANT CHANGE UP (ref 2–14)
NEUTROPHILS # BLD AUTO: 4.12 K/UL — SIGNIFICANT CHANGE UP (ref 1.8–7.4)
NEUTROPHILS NFR BLD AUTO: 71 % — SIGNIFICANT CHANGE UP (ref 43–77)
NRBC # BLD: 0 /100 WBCS — SIGNIFICANT CHANGE UP (ref 0–0)
NRBC # BLD: 0 /100 WBCS — SIGNIFICANT CHANGE UP (ref 0–0)
OSMOLALITY UR: 420 MOS/KG — SIGNIFICANT CHANGE UP (ref 50–1200)
PHOSPHATE SERPL-MCNC: 3.4 MG/DL — SIGNIFICANT CHANGE UP (ref 2.5–4.5)
PLATELET # BLD AUTO: 211 K/UL — SIGNIFICANT CHANGE UP (ref 150–400)
PLATELET # BLD AUTO: 219 K/UL — SIGNIFICANT CHANGE UP (ref 150–400)
POTASSIUM SERPL-MCNC: 3.7 MMOL/L — SIGNIFICANT CHANGE UP (ref 3.5–5.3)
POTASSIUM SERPL-SCNC: 3.7 MMOL/L — SIGNIFICANT CHANGE UP (ref 3.5–5.3)
RBC # BLD: 2.52 M/UL — LOW (ref 4.2–5.8)
RBC # BLD: 2.55 M/UL — LOW (ref 4.2–5.8)
RBC # BLD: 2.61 M/UL — LOW (ref 4.2–5.8)
RBC # FLD: 15.5 % — HIGH (ref 10.3–14.5)
RBC # FLD: 15.5 % — HIGH (ref 10.3–14.5)
RETICS #: 46.7 K/UL — SIGNIFICANT CHANGE UP (ref 25–125)
RETICS/RBC NFR: 1.8 % — SIGNIFICANT CHANGE UP (ref 0.5–2.5)
SODIUM SERPL-SCNC: 140 MMOL/L — SIGNIFICANT CHANGE UP (ref 135–145)
SODIUM UR-SCNC: 87 MMOL/L — SIGNIFICANT CHANGE UP (ref 40–220)
TIBC SERPL-MCNC: 234 UG/DL — LOW (ref 250–450)
TOTAL CHOLESTEROL/HDL RATIO MEASUREMENT: 1.8 RATIO — LOW (ref 3.4–9.6)
TRIGL SERPL-MCNC: 66 MG/DL — SIGNIFICANT CHANGE UP (ref 10–149)
TSH SERPL-MCNC: 1.42 UU/ML — SIGNIFICANT CHANGE UP (ref 0.34–4.82)
UIBC SERPL-MCNC: 194 UG/DL — SIGNIFICANT CHANGE UP (ref 110–370)
VIT B12 SERPL-MCNC: 1037 PG/ML — SIGNIFICANT CHANGE UP (ref 232–1245)
WBC # BLD: 5.8 K/UL — SIGNIFICANT CHANGE UP (ref 3.8–10.5)
WBC # BLD: 6.05 K/UL — SIGNIFICANT CHANGE UP (ref 3.8–10.5)
WBC # FLD AUTO: 5.8 K/UL — SIGNIFICANT CHANGE UP (ref 3.8–10.5)
WBC # FLD AUTO: 6.05 K/UL — SIGNIFICANT CHANGE UP (ref 3.8–10.5)

## 2020-01-28 PROCEDURE — 99233 SBSQ HOSP IP/OBS HIGH 50: CPT | Mod: GC

## 2020-01-28 RX ORDER — FERROUS SULFATE 325(65) MG
325 TABLET ORAL
Refills: 0 | Status: DISCONTINUED | OUTPATIENT
Start: 2020-01-28 | End: 2020-01-28

## 2020-01-28 RX ORDER — FAMOTIDINE 10 MG/ML
20 INJECTION INTRAVENOUS DAILY
Refills: 0 | Status: DISCONTINUED | OUTPATIENT
Start: 2020-01-28 | End: 2020-01-31

## 2020-01-28 RX ORDER — INSULIN GLARGINE 100 [IU]/ML
9 INJECTION, SOLUTION SUBCUTANEOUS AT BEDTIME
Refills: 0 | Status: DISCONTINUED | OUTPATIENT
Start: 2020-01-28 | End: 2020-01-28

## 2020-01-28 RX ORDER — IPRATROPIUM/ALBUTEROL SULFATE 18-103MCG
3 AEROSOL WITH ADAPTER (GRAM) INHALATION EVERY 6 HOURS
Refills: 0 | Status: DISCONTINUED | OUTPATIENT
Start: 2020-01-28 | End: 2020-01-31

## 2020-01-28 RX ORDER — SODIUM CHLORIDE 9 MG/ML
1000 INJECTION, SOLUTION INTRAVENOUS
Refills: 0 | Status: DISCONTINUED | OUTPATIENT
Start: 2020-01-28 | End: 2020-01-31

## 2020-01-28 RX ORDER — DONEPEZIL HYDROCHLORIDE 10 MG/1
10 TABLET, FILM COATED ORAL AT BEDTIME
Refills: 0 | Status: DISCONTINUED | OUTPATIENT
Start: 2020-01-27 | End: 2020-01-31

## 2020-01-28 RX ORDER — MEMANTINE HYDROCHLORIDE 10 MG/1
0 TABLET ORAL
Qty: 0 | Refills: 0 | DISCHARGE

## 2020-01-28 RX ORDER — TAMSULOSIN HYDROCHLORIDE 0.4 MG/1
0.8 CAPSULE ORAL AT BEDTIME
Refills: 0 | Status: DISCONTINUED | OUTPATIENT
Start: 2020-01-27 | End: 2020-01-31

## 2020-01-28 RX ORDER — AMLODIPINE BESYLATE 2.5 MG/1
1 TABLET ORAL
Qty: 0 | Refills: 0 | DISCHARGE

## 2020-01-28 RX ORDER — ATORVASTATIN CALCIUM 80 MG/1
80 TABLET, FILM COATED ORAL AT BEDTIME
Refills: 0 | Status: DISCONTINUED | OUTPATIENT
Start: 2020-01-28 | End: 2020-01-31

## 2020-01-28 RX ORDER — CHOLECALCIFEROL (VITAMIN D3) 125 MCG
400 CAPSULE ORAL DAILY
Refills: 0 | Status: DISCONTINUED | OUTPATIENT
Start: 2020-01-28 | End: 2020-01-31

## 2020-01-28 RX ORDER — ASCORBIC ACID 60 MG
500 TABLET,CHEWABLE ORAL DAILY
Refills: 0 | Status: DISCONTINUED | OUTPATIENT
Start: 2020-01-28 | End: 2020-01-28

## 2020-01-28 RX ORDER — IRON SUCROSE 20 MG/ML
100 INJECTION, SOLUTION INTRAVENOUS EVERY 24 HOURS
Refills: 0 | Status: DISCONTINUED | OUTPATIENT
Start: 2020-01-28 | End: 2020-01-29

## 2020-01-28 RX ORDER — FINASTERIDE 5 MG/1
5 TABLET, FILM COATED ORAL DAILY
Refills: 0 | Status: DISCONTINUED | OUTPATIENT
Start: 2020-01-27 | End: 2020-01-31

## 2020-01-28 RX ORDER — INSULIN GLARGINE 100 [IU]/ML
4 INJECTION, SOLUTION SUBCUTANEOUS AT BEDTIME
Refills: 0 | Status: DISCONTINUED | OUTPATIENT
Start: 2020-01-28 | End: 2020-01-31

## 2020-01-28 RX ORDER — METOPROLOL TARTRATE 50 MG
25 TABLET ORAL
Refills: 0 | Status: DISCONTINUED | OUTPATIENT
Start: 2020-01-28 | End: 2020-01-31

## 2020-01-28 RX ORDER — INSULIN DETEMIR 100/ML (3)
9 INSULIN PEN (ML) SUBCUTANEOUS AT BEDTIME
Refills: 0 | Status: DISCONTINUED | OUTPATIENT
Start: 2020-01-27 | End: 2020-01-28

## 2020-01-28 RX ORDER — FLUOXETINE HCL 10 MG
1 CAPSULE ORAL
Qty: 0 | Refills: 0 | DISCHARGE

## 2020-01-28 RX ORDER — INSULIN LISPRO 100/ML
VIAL (ML) SUBCUTANEOUS
Refills: 0 | Status: DISCONTINUED | OUTPATIENT
Start: 2020-01-27 | End: 2020-01-31

## 2020-01-28 RX ORDER — FUROSEMIDE 40 MG
20 TABLET ORAL ONCE
Refills: 0 | Status: COMPLETED | OUTPATIENT
Start: 2020-01-28 | End: 2020-01-28

## 2020-01-28 RX ORDER — ROSUVASTATIN CALCIUM 5 MG/1
1 TABLET ORAL
Qty: 0 | Refills: 0 | DISCHARGE

## 2020-01-28 RX ORDER — HEPARIN SODIUM 5000 [USP'U]/ML
5000 INJECTION INTRAVENOUS; SUBCUTANEOUS EVERY 8 HOURS
Refills: 0 | Status: DISCONTINUED | OUTPATIENT
Start: 2020-01-28 | End: 2020-01-31

## 2020-01-28 RX ORDER — ASPIRIN/CALCIUM CARB/MAGNESIUM 324 MG
81 TABLET ORAL DAILY
Refills: 0 | Status: DISCONTINUED | OUTPATIENT
Start: 2020-01-28 | End: 2020-01-31

## 2020-01-28 RX ORDER — GLUCAGON INJECTION, SOLUTION 0.5 MG/.1ML
1 INJECTION, SOLUTION SUBCUTANEOUS ONCE
Refills: 0 | Status: DISCONTINUED | OUTPATIENT
Start: 2020-01-28 | End: 2020-01-31

## 2020-01-28 RX ORDER — GABAPENTIN 400 MG/1
300 CAPSULE ORAL
Refills: 0 | Status: DISCONTINUED | OUTPATIENT
Start: 2020-01-27 | End: 2020-01-31

## 2020-01-28 RX ORDER — CLOPIDOGREL BISULFATE 75 MG/1
75 TABLET, FILM COATED ORAL DAILY
Refills: 0 | Status: DISCONTINUED | OUTPATIENT
Start: 2020-01-27 | End: 2020-01-31

## 2020-01-28 RX ADMIN — INSULIN GLARGINE 4 UNIT(S): 100 INJECTION, SOLUTION SUBCUTANEOUS at 22:18

## 2020-01-28 RX ADMIN — FAMOTIDINE 20 MILLIGRAM(S): 10 INJECTION INTRAVENOUS at 11:51

## 2020-01-28 RX ADMIN — CLOPIDOGREL BISULFATE 75 MILLIGRAM(S): 75 TABLET, FILM COATED ORAL at 11:48

## 2020-01-28 RX ADMIN — TAMSULOSIN HYDROCHLORIDE 0.8 MILLIGRAM(S): 0.4 CAPSULE ORAL at 01:58

## 2020-01-28 RX ADMIN — FINASTERIDE 5 MILLIGRAM(S): 5 TABLET, FILM COATED ORAL at 11:48

## 2020-01-28 RX ADMIN — Medication 20 MILLIGRAM(S): at 17:20

## 2020-01-28 RX ADMIN — Medication 25 MILLIGRAM(S): at 05:45

## 2020-01-28 RX ADMIN — Medication 3 MILLILITER(S): at 22:15

## 2020-01-28 RX ADMIN — SODIUM CHLORIDE 100 MILLILITER(S): 9 INJECTION INTRAMUSCULAR; INTRAVENOUS; SUBCUTANEOUS at 08:06

## 2020-01-28 RX ADMIN — GABAPENTIN 300 MILLIGRAM(S): 400 CAPSULE ORAL at 17:20

## 2020-01-28 RX ADMIN — HEPARIN SODIUM 5000 UNIT(S): 5000 INJECTION INTRAVENOUS; SUBCUTANEOUS at 14:21

## 2020-01-28 RX ADMIN — HEPARIN SODIUM 5000 UNIT(S): 5000 INJECTION INTRAVENOUS; SUBCUTANEOUS at 22:18

## 2020-01-28 RX ADMIN — Medication 81 MILLIGRAM(S): at 11:48

## 2020-01-28 RX ADMIN — Medication 10 MILLIGRAM(S): at 05:45

## 2020-01-28 RX ADMIN — SODIUM CHLORIDE 100 MILLILITER(S): 9 INJECTION INTRAMUSCULAR; INTRAVENOUS; SUBCUTANEOUS at 00:02

## 2020-01-28 RX ADMIN — GABAPENTIN 300 MILLIGRAM(S): 400 CAPSULE ORAL at 05:45

## 2020-01-28 RX ADMIN — TAMSULOSIN HYDROCHLORIDE 0.8 MILLIGRAM(S): 0.4 CAPSULE ORAL at 22:18

## 2020-01-28 RX ADMIN — IRON SUCROSE 210 MILLIGRAM(S): 20 INJECTION, SOLUTION INTRAVENOUS at 22:17

## 2020-01-28 RX ADMIN — DONEPEZIL HYDROCHLORIDE 10 MILLIGRAM(S): 10 TABLET, FILM COATED ORAL at 22:17

## 2020-01-28 RX ADMIN — Medication 400 UNIT(S): at 11:48

## 2020-01-28 RX ADMIN — HEPARIN SODIUM 5000 UNIT(S): 5000 INJECTION INTRAVENOUS; SUBCUTANEOUS at 05:45

## 2020-01-28 RX ADMIN — ATORVASTATIN CALCIUM 80 MILLIGRAM(S): 80 TABLET, FILM COATED ORAL at 22:18

## 2020-01-28 RX ADMIN — Medication 325 MILLIGRAM(S): at 17:20

## 2020-01-28 NOTE — PROGRESS NOTE ADULT - PROBLEM SELECTOR PROBLEM 5
BPH (benign prostatic hyperplasia) Congestive heart failure, unspecified HF chronicity, unspecified heart failure type

## 2020-01-28 NOTE — PROGRESS NOTE ADULT - SUBJECTIVE AND OBJECTIVE BOX
NP Note discussed with  Primary Attending    Patient is a 74y old  Male who presents with a chief complaint of lethargy (2020 23:45)    HPI   74 year old male, from home , with PMH of legally blind, dementia, CHF, DM, HTN, depression, CVA with residual left sided deficits and BPH,  EGD/Colonoscopy  2 yrs ago normal per wife. who presented to ED sent by PCP for  anemia (Hgb/Hct  7.2/23.6) and lethargy. In ED , FOBT negative, Hgb/Hct  7.7/24.0 .UA negative. CT head showing chronic ischemic changes. No acute changes compared to prior CT. Noted to have mild elevation in creatinine of 1.39. No leukocytosis; afebrile . CBC this morning shows Hgb downtrending, to 7.6. No overt bleeding noted.       INTERVAL HPI/OVERNIGHT EVENTS: Pt seen and examined this morning. Pt reports feeling well.     MEDICATIONS  (STANDING):  aspirin enteric coated 81 milliGRAM(s) Oral daily  atorvastatin 80 milliGRAM(s) Oral at bedtime  cholecalciferol 400 Unit(s) Oral daily  clopidogrel Tablet 75 milliGRAM(s) Oral daily  dextrose 5%. 1000 milliLiter(s) (50 mL/Hr) IV Continuous <Continuous>  donepezil 10 milliGRAM(s) Oral at bedtime  enalapril 10 milliGRAM(s) Oral daily  famotidine    Tablet 20 milliGRAM(s) Oral daily  finasteride 5 milliGRAM(s) Oral daily  gabapentin 300 milliGRAM(s) Oral two times a day  heparin  Injectable 5000 Unit(s) SubCutaneous every 8 hours  insulin glargine Injectable (LANTUS) 9 Unit(s) SubCutaneous at bedtime  insulin lispro (HumaLOG) corrective regimen sliding scale   SubCutaneous three times a day before meals  metoprolol tartrate 25 milliGRAM(s) Oral two times a day  sodium chloride 0.9%. 250 milliLiter(s) (100 mL/Hr) IV Continuous <Continuous>  tamsulosin 0.8 milliGRAM(s) Oral at bedtime    MEDICATIONS  (PRN):  glucagon  Injectable 1 milliGRAM(s) IntraMuscular once PRN Glucose LESS THAN 70 milligrams/deciliter      __________________________________________________  REVIEW OF SYSTEMS:    CONSTITUTIONAL: No fever,   EYES: no acute visual disturbances  NECK: No pain or stiffness  RESPIRATORY: No cough; No shortness of breath  CARDIOVASCULAR: No chest pain, no palpitations  GASTROINTESTINAL: No pain. No nausea or vomiting; No diarrhea   NEUROLOGICAL: No headache or numbness, no tremors  MUSCULOSKELETAL: No joint pain, no muscle pain  GENITOURINARY: no dysuria, no frequency, no hesitancy  PSYCHIATRY: no depression , no anxiety  ALL OTHER  ROS negative        Vital Signs Last 24 Hrs  T(C): 36.7 (2020 10:28), Max: 37.2 (2020 19:06)  T(F): 98.1 (2020 10:28), Max: 99 (2020 19:06)  HR: 58 (2020 10:28) (58 - 81)  BP: 103/53 (2020 10:28) (100/79 - 120/77)  BP(mean): --  RR: 16 (2020 10:28) (16 - 18)  SpO2: 99% (2020 10:28) (88% - 100%)    ________________________________________________  PHYSICAL EXAM:  GENERAL: NAD  HEENT: Normocephalic;  conjunctivae and sclerae clear; moist mucous membranes;   NECK : supple  CHEST/LUNG:Effort normal.  Clear to auscultation bilaterally with good air entry   HEART: S1 S2  regular; no murmurs, gallops or rubs  ABDOMEN: Bowel sounds present, Soft, Nontender, Nondistended;  EXTREMITIES: no cyanosis; no edema; no calf tenderness  SKIN: warm and dry; no rash  NERVOUS SYSTEM:  Awake and alert; Oriented  to place, person and time ; no new deficits    _________________________________________________  LABS:                        7.6    5.80  )-----------( 219      ( 2020 07:04 )             25.1     -    140  |  109<H>  |  22<H>  ----------------------------<  105<H>  3.7   |  26  |  1.20    Ca    8.6      2020 07:04  Phos  3.4       Mg     2.0         TPro  8.8<H>  /  Alb  4.2  /  TBili  0.4  /  DBili  x   /  AST  26  /  ALT  58  /  AlkPhos  114      PT/INR - ( 2020 14:14 )   PT: 11.7 sec;   INR: 1.05 ratio         PTT - ( 2020 14:14 )  PTT:35.4 sec  Urinalysis Basic - ( 2020 18:02 )    Color: Yellow / Appearance: Clear / S.015 / pH: x  Gluc: x / Ketone: Negative  / Bili: Negative / Urobili: Negative   Blood: x / Protein: 100 / Nitrite: Negative   Leuk Esterase: Negative / RBC: 0-2 /HPF / WBC 0-2 /HPF   Sq Epi: x / Non Sq Epi: Occasional /HPF / Bacteria: Few /HPF      CAPILLARY BLOOD GLUCOSE      POCT Blood Glucose.: 108 mg/dL (2020 11:17)  POCT Blood Glucose.: 110 mg/dL (2020 07:52)  POCT Blood Glucose.: 145 mg/dL (2020 12:52)        RADIOLOGY & ADDITIONAL TESTS:    Imaging Personally Reviewed:  YES/NO    Consultant(s) Notes Reviewed:   YES/ No    Care Discussed with Consultants :     Plan of care was discussed with patient and /or primary care giver; all questions and concerns were addressed and care was aligned with patient's wishes. NP Note discussed with  Primary Attending    Patient is a 74y old  Male who presents with a chief complaint of lethargy (2020 23:45)    HPI   74 year old male, from home , with PMH of legally blind, dementia, CHF, DM, HTN, depression, CVA with residual left sided deficits and BPH,  EGD/Colonoscopy  2 yrs ago normal per wife. who presented to ED sent by PCP for  anemia (Hgb/Hct  7.2/23.6) and lethargy. In ED , FOBT negative, Hgb/Hct  7.7/24.0 .UA negative. CT head showing chronic ischemic changes. No acute changes compared to prior CT. Noted to have mild elevation in creatinine of 1.39. No leukocytosis; afebrile . CBC this morning shows Hgb downtrending, to 7.6. No overt bleeding noted.       INTERVAL HPI/OVERNIGHT EVENTS: Pt seen and examined this morning. Pt reports feeling well.     MEDICATIONS  (STANDING):  aspirin enteric coated 81 milliGRAM(s) Oral daily  atorvastatin 80 milliGRAM(s) Oral at bedtime  cholecalciferol 400 Unit(s) Oral daily  clopidogrel Tablet 75 milliGRAM(s) Oral daily  dextrose 5%. 1000 milliLiter(s) (50 mL/Hr) IV Continuous <Continuous>  donepezil 10 milliGRAM(s) Oral at bedtime  enalapril 10 milliGRAM(s) Oral daily  famotidine    Tablet 20 milliGRAM(s) Oral daily  finasteride 5 milliGRAM(s) Oral daily  gabapentin 300 milliGRAM(s) Oral two times a day  heparin  Injectable 5000 Unit(s) SubCutaneous every 8 hours  insulin glargine Injectable (LANTUS) 9 Unit(s) SubCutaneous at bedtime  insulin lispro (HumaLOG) corrective regimen sliding scale   SubCutaneous three times a day before meals  metoprolol tartrate 25 milliGRAM(s) Oral two times a day  sodium chloride 0.9%. 250 milliLiter(s) (100 mL/Hr) IV Continuous <Continuous>  tamsulosin 0.8 milliGRAM(s) Oral at bedtime    MEDICATIONS  (PRN):  glucagon  Injectable 1 milliGRAM(s) IntraMuscular once PRN Glucose LESS THAN 70 milligrams/deciliter      __________________________________________________  REVIEW OF SYSTEMS:    CONSTITUTIONAL: No fever,   EYES: no acute visual disturbances  NECK: No pain or stiffness  RESPIRATORY: No cough; No shortness of breath  CARDIOVASCULAR: No chest pain, no palpitations  GASTROINTESTINAL: No pain. No nausea or vomiting; No diarrhea   NEUROLOGICAL: No headache or numbness, no tremors  MUSCULOSKELETAL: No joint pain, no muscle pain  GENITOURINARY: no dysuria, no frequency, no hesitancy  PSYCHIATRY: no depression , no anxiety  ALL OTHER  ROS negative        Vital Signs Last 24 Hrs  T(C): 36.7 (2020 10:28), Max: 37.2 (2020 19:06)  T(F): 98.1 (2020 10:28), Max: 99 (2020 19:06)  HR: 58 (2020 10:28) (58 - 81)  BP: 103/53 (2020 10:28) (100/79 - 120/77)  BP(mean): --  RR: 16 (2020 10:28) (16 - 18)  SpO2: 99% (2020 10:28) (88% - 100%)    ________________________________________________  PHYSICAL EXAM:  GENERAL: NAD  HEENT: Normocephalic;  conjunctivae and sclerae clear; moist mucous membranes;   NECK : supple  CHEST/LUNG:Effort normal.  Clear to auscultation bilaterally with good air entry   HEART: S1 S2  regular; no murmurs, gallops or rubs  ABDOMEN: Bowel sounds present, Soft, Nontender, Nondistended;  EXTREMITIES: no cyanosis; no edema; no calf tenderness  SKIN: warm and dry; no rash  NERVOUS SYSTEM:  Awake and alert; Oriented  to place, person ; no new deficits    _________________________________________________  LABS:                        7.6    5.80  )-----------( 219      ( 2020 07:04 )             25.1         140  |  109<H>  |  22<H>  ----------------------------<  105<H>  3.7   |  26  |  1.20    Ca    8.6      2020 07:04  Phos  3.4       Mg     2.0         TPro  8.8<H>  /  Alb  4.2  /  TBili  0.4  /  DBili  x   /  AST  26  /  ALT  58  /  AlkPhos  114      PT/INR - ( 2020 14:14 )   PT: 11.7 sec;   INR: 1.05 ratio         PTT - ( 2020 14:14 )  PTT:35.4 sec  Urinalysis Basic - ( 2020 18:02 )    Color: Yellow / Appearance: Clear / S.015 / pH: x  Gluc: x / Ketone: Negative  / Bili: Negative / Urobili: Negative   Blood: x / Protein: 100 / Nitrite: Negative   Leuk Esterase: Negative / RBC: 0-2 /HPF / WBC 0-2 /HPF   Sq Epi: x / Non Sq Epi: Occasional /HPF / Bacteria: Few /HPF      CAPILLARY BLOOD GLUCOSE      POCT Blood Glucose.: 108 mg/dL (2020 11:17)  POCT Blood Glucose.: 110 mg/dL (2020 07:52)  POCT Blood Glucose.: 145 mg/dL (2020 12:52)        RADIOLOGY & ADDITIONAL TESTS:    < from: CT Head No Cont (20 @ 20:33) >  IMPRESSION:    1)  chronic ischemic changes are diffusely noted throughout both hemispheres as well as within the brainstem and cerebellum. Similar findings were noted previously. No acute abnormality is suggested.  2)  clear sinuses and mastoids..    ----------------  < from: Xray Chest 1 View- PORTABLE-Urgent (20 @ 21:38) >  IMPRESSION: New central infiltrates possibly congestive in etiology.    ------------------------          Consultant(s) Notes Reviewed:   YES/ No    Care Discussed with Consultants :     Plan of care was discussed with patient and /or primary care giver; all questions and concerns were addressed and care was aligned with patient's wishes.

## 2020-01-28 NOTE — PROGRESS NOTE ADULT - PROBLEM SELECTOR PLAN 1
likely due to worsening dementia vs worsening anemia vs metabolic   CT head negative for acute pathology  UA neg  no leukocytosis  afebrile  PT   Fall precautions  ASpiration precautions

## 2020-01-28 NOTE — PROGRESS NOTE ADULT - PROBLEM SELECTOR PLAN 7
A1C 6.8  BG goal 140-180  decrease Lantus to 4 units at HS  Monitor BG ac/Hs and cover with low corrective HISS  HYpoglycemia protocol

## 2020-01-28 NOTE — PROGRESS NOTE ADULT - PROBLEM SELECTOR PLAN 2
likely due to chronic disease : CAD, HTN, DM and ASHLEY  Iron levels low  Hgb stable at 7.6 today  No overt bleeding  Start iron supplements  Monitor CBC in am

## 2020-01-28 NOTE — PROGRESS NOTE ADULT - PROBLEM SELECTOR PLAN 3
likely due to poor po intake  Creat improved to 1.20 s/p IV hydration  Avoid nephrotoxic agents,  NSAIDs

## 2020-01-29 ENCOUNTER — TRANSCRIPTION ENCOUNTER (OUTPATIENT)
Age: 75
End: 2020-01-29

## 2020-01-29 LAB
% ALBUMIN: 55.1 % — SIGNIFICANT CHANGE UP
% ALPHA 1: 5.4 % — SIGNIFICANT CHANGE UP
% ALPHA 2: 14.8 % — SIGNIFICANT CHANGE UP
% BETA: 10.7 % — SIGNIFICANT CHANGE UP
% GAMMA: 14 % — SIGNIFICANT CHANGE UP
% M SPIKE: 1.8 % — SIGNIFICANT CHANGE UP
ALBUMIN SERPL ELPH-MCNC: 3.1 G/DL — LOW (ref 3.6–5.5)
ALBUMIN/GLOB SERPL ELPH: 1.2 RATIO — SIGNIFICANT CHANGE UP
ALPHA1 GLOB SERPL ELPH-MCNC: 0.3 G/DL — SIGNIFICANT CHANGE UP (ref 0.1–0.4)
ALPHA2 GLOB SERPL ELPH-MCNC: 0.8 G/DL — SIGNIFICANT CHANGE UP (ref 0.5–1)
ANION GAP SERPL CALC-SCNC: 4 MMOL/L — LOW (ref 5–17)
B-GLOBULIN SERPL ELPH-MCNC: 0.6 G/DL — SIGNIFICANT CHANGE UP (ref 0.5–1)
BASOPHILS # BLD AUTO: 0.04 K/UL — SIGNIFICANT CHANGE UP (ref 0–0.2)
BASOPHILS NFR BLD AUTO: 0.8 % — SIGNIFICANT CHANGE UP (ref 0–2)
BLD GP AB SCN SERPL QL: SIGNIFICANT CHANGE UP
BUN SERPL-MCNC: 22 MG/DL — HIGH (ref 7–18)
CALCIUM SERPL-MCNC: 8.5 MG/DL — SIGNIFICANT CHANGE UP (ref 8.4–10.5)
CHLORIDE SERPL-SCNC: 112 MMOL/L — HIGH (ref 96–108)
CO2 SERPL-SCNC: 27 MMOL/L — SIGNIFICANT CHANGE UP (ref 22–31)
CREAT SERPL-MCNC: 1.21 MG/DL — SIGNIFICANT CHANGE UP (ref 0.5–1.3)
CULTURE RESULTS: NO GROWTH — SIGNIFICANT CHANGE UP
EOSINOPHIL # BLD AUTO: 0.23 K/UL — SIGNIFICANT CHANGE UP (ref 0–0.5)
EOSINOPHIL NFR BLD AUTO: 4.5 % — SIGNIFICANT CHANGE UP (ref 0–6)
GAMMA GLOBULIN: 0.8 G/DL — SIGNIFICANT CHANGE UP (ref 0.6–1.6)
GLUCOSE BLDC GLUCOMTR-MCNC: 105 MG/DL — HIGH (ref 70–99)
GLUCOSE BLDC GLUCOMTR-MCNC: 136 MG/DL — HIGH (ref 70–99)
GLUCOSE BLDC GLUCOMTR-MCNC: 154 MG/DL — HIGH (ref 70–99)
GLUCOSE BLDC GLUCOMTR-MCNC: 172 MG/DL — HIGH (ref 70–99)
GLUCOSE SERPL-MCNC: 99 MG/DL — SIGNIFICANT CHANGE UP (ref 70–99)
HAPTOGLOB SERPL-MCNC: 208 MG/DL — HIGH (ref 34–200)
HCT VFR BLD CALC: 23.9 % — LOW (ref 39–50)
HGB BLD-MCNC: 7.4 G/DL — LOW (ref 13–17)
IMM GRANULOCYTES NFR BLD AUTO: 0.2 % — SIGNIFICANT CHANGE UP (ref 0–1.5)
INTERPRETATION SERPL IFE-IMP: SIGNIFICANT CHANGE UP
LDH SERPL L TO P-CCNC: 170 U/L — SIGNIFICANT CHANGE UP (ref 120–225)
LYMPHOCYTES # BLD AUTO: 0.86 K/UL — LOW (ref 1–3.3)
LYMPHOCYTES # BLD AUTO: 16.8 % — SIGNIFICANT CHANGE UP (ref 13–44)
M-SPIKE: 0.1 G/DL — HIGH (ref 0–0)
MCHC RBC-ENTMCNC: 29.6 PG — SIGNIFICANT CHANGE UP (ref 27–34)
MCHC RBC-ENTMCNC: 31 GM/DL — LOW (ref 32–36)
MCV RBC AUTO: 95.6 FL — SIGNIFICANT CHANGE UP (ref 80–100)
MONOCYTES # BLD AUTO: 0.39 K/UL — SIGNIFICANT CHANGE UP (ref 0–0.9)
MONOCYTES NFR BLD AUTO: 7.6 % — SIGNIFICANT CHANGE UP (ref 2–14)
NEUTROPHILS # BLD AUTO: 3.6 K/UL — SIGNIFICANT CHANGE UP (ref 1.8–7.4)
NEUTROPHILS NFR BLD AUTO: 70.1 % — SIGNIFICANT CHANGE UP (ref 43–77)
NRBC # BLD: 0 /100 WBCS — SIGNIFICANT CHANGE UP (ref 0–0)
PLATELET # BLD AUTO: 224 K/UL — SIGNIFICANT CHANGE UP (ref 150–400)
POTASSIUM SERPL-MCNC: 3.6 MMOL/L — SIGNIFICANT CHANGE UP (ref 3.5–5.3)
POTASSIUM SERPL-SCNC: 3.6 MMOL/L — SIGNIFICANT CHANGE UP (ref 3.5–5.3)
PROT PATTERN SERPL ELPH-IMP: SIGNIFICANT CHANGE UP
PROT SERPL-MCNC: 5.6 G/DL — LOW (ref 6–8.3)
PROT SERPL-MCNC: 5.6 G/DL — LOW (ref 6–8.3)
RBC # BLD: 2.5 M/UL — LOW (ref 4.2–5.8)
RBC # FLD: 15.3 % — HIGH (ref 10.3–14.5)
SODIUM SERPL-SCNC: 143 MMOL/L — SIGNIFICANT CHANGE UP (ref 135–145)
SPECIMEN SOURCE: SIGNIFICANT CHANGE UP
TSH SERPL-MCNC: 1.31 UU/ML — SIGNIFICANT CHANGE UP (ref 0.34–4.82)
WBC # BLD: 5.13 K/UL — SIGNIFICANT CHANGE UP (ref 3.8–10.5)
WBC # FLD AUTO: 5.13 K/UL — SIGNIFICANT CHANGE UP (ref 3.8–10.5)

## 2020-01-29 PROCEDURE — 99232 SBSQ HOSP IP/OBS MODERATE 35: CPT | Mod: GC

## 2020-01-29 RX ORDER — FUROSEMIDE 40 MG
20 TABLET ORAL ONCE
Refills: 0 | Status: COMPLETED | OUTPATIENT
Start: 2020-01-29 | End: 2020-01-29

## 2020-01-29 RX ORDER — FUROSEMIDE 40 MG
20 TABLET ORAL ONCE
Refills: 0 | Status: DISCONTINUED | OUTPATIENT
Start: 2020-01-29 | End: 2020-01-29

## 2020-01-29 RX ORDER — COLLAGENASE CLOSTRIDIUM HIST. 250 UNIT/G
1 OINTMENT (GRAM) TOPICAL DAILY
Refills: 0 | Status: DISCONTINUED | OUTPATIENT
Start: 2020-01-29 | End: 2020-01-31

## 2020-01-29 RX ORDER — IRON SUCROSE 20 MG/ML
200 INJECTION, SOLUTION INTRAVENOUS EVERY 24 HOURS
Refills: 0 | Status: DISCONTINUED | OUTPATIENT
Start: 2020-01-29 | End: 2020-01-31

## 2020-01-29 RX ADMIN — TAMSULOSIN HYDROCHLORIDE 0.8 MILLIGRAM(S): 0.4 CAPSULE ORAL at 21:37

## 2020-01-29 RX ADMIN — FAMOTIDINE 20 MILLIGRAM(S): 10 INJECTION INTRAVENOUS at 12:13

## 2020-01-29 RX ADMIN — HEPARIN SODIUM 5000 UNIT(S): 5000 INJECTION INTRAVENOUS; SUBCUTANEOUS at 05:37

## 2020-01-29 RX ADMIN — Medication 1: at 16:51

## 2020-01-29 RX ADMIN — DONEPEZIL HYDROCHLORIDE 10 MILLIGRAM(S): 10 TABLET, FILM COATED ORAL at 21:37

## 2020-01-29 RX ADMIN — Medication 400 UNIT(S): at 12:13

## 2020-01-29 RX ADMIN — FINASTERIDE 5 MILLIGRAM(S): 5 TABLET, FILM COATED ORAL at 12:12

## 2020-01-29 RX ADMIN — Medication 3 MILLILITER(S): at 08:43

## 2020-01-29 RX ADMIN — HEPARIN SODIUM 5000 UNIT(S): 5000 INJECTION INTRAVENOUS; SUBCUTANEOUS at 21:37

## 2020-01-29 RX ADMIN — HEPARIN SODIUM 5000 UNIT(S): 5000 INJECTION INTRAVENOUS; SUBCUTANEOUS at 16:32

## 2020-01-29 RX ADMIN — IRON SUCROSE 110 MILLIGRAM(S): 20 INJECTION, SOLUTION INTRAVENOUS at 21:36

## 2020-01-29 RX ADMIN — Medication 10 MILLIGRAM(S): at 05:37

## 2020-01-29 RX ADMIN — INSULIN GLARGINE 4 UNIT(S): 100 INJECTION, SOLUTION SUBCUTANEOUS at 21:37

## 2020-01-29 RX ADMIN — Medication 25 MILLIGRAM(S): at 17:00

## 2020-01-29 RX ADMIN — Medication 3 MILLILITER(S): at 14:56

## 2020-01-29 RX ADMIN — CLOPIDOGREL BISULFATE 75 MILLIGRAM(S): 75 TABLET, FILM COATED ORAL at 12:13

## 2020-01-29 RX ADMIN — GABAPENTIN 300 MILLIGRAM(S): 400 CAPSULE ORAL at 05:37

## 2020-01-29 RX ADMIN — Medication 81 MILLIGRAM(S): at 12:13

## 2020-01-29 RX ADMIN — Medication 25 MILLIGRAM(S): at 05:37

## 2020-01-29 RX ADMIN — Medication 3 MILLILITER(S): at 20:52

## 2020-01-29 RX ADMIN — GABAPENTIN 300 MILLIGRAM(S): 400 CAPSULE ORAL at 17:00

## 2020-01-29 RX ADMIN — ATORVASTATIN CALCIUM 80 MILLIGRAM(S): 80 TABLET, FILM COATED ORAL at 21:37

## 2020-01-29 NOTE — CONSULT NOTE ADULT - SUBJECTIVE AND OBJECTIVE BOX
Chief Complaint : Patient is a 74y old  Male who presents with a chief complaint of lethargy (29 Jan 2020 11:20)    HPI:  Patient is a 74 year old male, with PMH of legally blind, dementia, CHF, DM, HTN, depression, CVA with residual left sided deficits and BPH, who comes in from home with his wife after being told his hemoglobin dropped from 10.8 to 7.2 and drop in hematocrit from 34 to 23.6. Patient went to PCP on Friday and had labs done and was told about results and was advised to come to the ED. Wife present at bedside also stated that patient has been lethargic for the past 2-3 months. Of note, patient had EGD done this past September and also a colonoscopy about 2 years ago which were both normal. Patient denies any chest pain, headache, dizziness, nausea, vomiting, abdominal pain. No signs of bleeding noted as per wife. Patient also was recently discharged from subacute rehab last month. (27 Jan 2020 23:45)    Podiatry HPI: 74y year old Male consulted for Right hallux wound, seen by podiatry team with wife at bedside. Patient is AAOx3, intermittent cough during exam, on 2L NC. Pt. relates he was recently at a rehab facility where a sock was rolled up in his shoe while he was undergoing PT that led to a pressure ulcer to the great toe. He is followed by an outside podiatrist in the [facility] group every 8 weeks and was applying a cream to the wound as directed. Pt and family could not recall the name of the medication. He is also schedule to be seen by vascular surgeon Dr. Becker for evaluation next month. Pt. admits to having neuropathy to both his feet. He was able to ambulate at the rehab facility with a walker however he has been active since he was discharged home from the facility. Admits to feeling lethargic.          Patient admits to  (-) Fevers, (-) Chills, (-) Nausea, (-) Vomiting, (-) Shortness of Breath      PMH: Legally blind  BPH (benign prostatic hyperplasia)  Depression  Hyperlipidemia  CVA (cerebral vascular accident)  Diabetes    PSH: No significant past surgical history      Allergies:No Known Allergies      Labs:                              7.4    5.13  )-----------( 224      ( 29 Jan 2020 07:24 )             23.9       01-29    143  |  112<H>  |  22<H>  ----------------------------<  99  3.6   |  27  |  1.21    Ca    8.5      29 Jan 2020 07:24  Phos  3.4     01-28  Mg     2.0     01-28    TPro  8.8<H>  /  Alb  4.2  /  TBili  0.4  /  DBili  x   /  AST  26  /  ALT  58  /  AlkPhos  114  01-27      Vital Signs Last 24 Hrs  T(C): 37 (29 Jan 2020 05:10), Max: 37 (29 Jan 2020 05:10)  T(F): 98.6 (29 Jan 2020 05:10), Max: 98.6 (29 Jan 2020 05:10)  HR: 60 (29 Jan 2020 11:19) (57 - 69)  BP: 97/44 (29 Jan 2020 11:19) (94/43 - 120/56)  BP(mean): --  RR: 20 (29 Jan 2020 05:10) (17 - 22)  SpO2: 98% (29 Jan 2020 11:19) (97% - 100%)      WBC Trend  5.13 Date (01-29 @ 07:24)  5.80 Date (01-28 @ 07:04)  6.05 Date (01-28 @ 00:07)  6.28 Date (01-27 @ 14:14)    Hemoglobin A1C, Whole Blood: 6.8 % (01-28-20 @ 09:44)          O:   General: Pleasant  male NAD & AOX3.    Derm:  Skin warm, diffuse xerosis b/l.   Open ulceration noted to the tip of right hallux, fibrotic base with surrounding hyperkeratosis. Circular lesion, measuring 0.3cm x 0.2cm x0.1cm. No periwound erythema or edema. No active discharge, no tunneling or tracking, no fluctuance, negative probe to bone. No malodor, no acute signs of infection.   thick mycotic, dystrophic nails of hygenic length b/l.   Vascular: DT pulses palpable, PT non-plpable. CFT < 3 seconds digits 1-5 bilateral. Absent pedal hair.    Neuro: Protective sensation diminished to the level of the digits bilateral.  MSK: Muscle strength 4/5 all major muscle groups bilateral.    A: Right hallux pressure ulcer, stable      P:   Chart reviewed and Patient evaluated  Discussed diagnosis and treatment with patient  Applied dry sterile dressing  Ordered NAKITA  Patient has appointment with Dr. Becker for vascular workup  Please consult Vascular Dr. Becker  NAKITA/PVR ordered  Weight bearing As Tolerated to right foot in surgical shoe.   No weight bearing restrictions to the left.   Discussed importance of daily foot examinations and proper shoe gear and to importance of lower Fasting Blood Glucose levels.   A1C 6.8%  Patient is to continue to follow up with his private podiatrist.   Discussed with Attending Dr. Schneider    Wound care order: Please apply Santyl to wound and cover with gauze and secure with mya. To be changed every other day.

## 2020-01-29 NOTE — PHYSICAL THERAPY INITIAL EVALUATION ADULT - CRITERIA FOR SKILLED THERAPEUTIC INTERVENTIONS
functional limitations in following categories/anticipated discharge recommendation/predicted duration of therapy intervention/therapy frequency/impairments found/risk reduction/prevention/rehab potential

## 2020-01-29 NOTE — PROGRESS NOTE ADULT - PROBLEM SELECTOR PLAN 3
CXR noted above  IVF discontinued  No dyspnea  Afebrile  WBC 5.13  Sats % on supplemental O2  Echo performed, awating read  Continue with PRN Duoneb treatments

## 2020-01-29 NOTE — PROGRESS NOTE ADULT - PROBLEM SELECTOR PLAN 5
Pt takes Metoprolol, ASA, Plavix, and Rosuvastatin   Continue with home regimen   Echo performed, awaiting read

## 2020-01-29 NOTE — PROGRESS NOTE ADULT - PROBLEM SELECTOR PLAN 2
SCr 1.21  Likely secondary to poor po intake  IVF discontinued secondary to CHF  Avoid Nephrotoxic Meds/ Agents like (NSAIDs, IV contrast, Aminoglycosides such as gentamicin, -Gadolinium contrast, Phosphate containing enemas, etc.)

## 2020-01-29 NOTE — PHYSICAL THERAPY INITIAL EVALUATION ADULT - DIAGNOSIS, PT EVAL
Pt. presents with decreased strength, endurance, and functional mobility s/p admission to ED for low h&h.

## 2020-01-29 NOTE — DISCHARGE NOTE NURSING/CASE MANAGEMENT/SOCIAL WORK - PATIENT PORTAL LINK FT
You can access the FollowMyHealth Patient Portal offered by [facility] by registering at the following website: [email or web address]. By joining [employer]’s FollowMyHealth portal, you will also be able to view your health information using other applications (apps) compatible with our system.

## 2020-01-29 NOTE — PHYSICAL THERAPY INITIAL EVALUATION ADULT - GENERAL OBSERVATIONS, REHAB EVAL
Pt. received supine in bed, NAD, supplemental O2 intact, motivated and compliant to participate in PT eval.

## 2020-01-29 NOTE — ADVANCED PRACTICE NURSE CONSULT - ASSESSMENT
This is a 74yr old male patient admitted for Altered Mental Status, presenting with a R. Great Toe Diabetic Ulcer with eschar and periwound callous without drainage

## 2020-01-29 NOTE — PROGRESS NOTE ADULT - PROBLEM SELECTOR PLAN 4
Resolving  CT head noted above  UA neg  WBC 5.13  Afebrile   Continue with fall precautions  Continue with aspiration precautions

## 2020-01-29 NOTE — ADVANCED PRACTICE NURSE CONSULT - RECOMMEDATIONS
-Please consult Podiatry for further evaluation of the R. Great Toe  -Leave open to air  -Elevate/float the patients heels using heel protectors and reposition the patient Q 2hrs using wedges or pillows

## 2020-01-29 NOTE — PROGRESS NOTE ADULT - PROBLEM SELECTOR PLAN 1
Hgb 7.4  No overt bleeding  Increase Venofer to 200 mg QD  Follow up CBC in AM   Dr. Laughlin (Heme/Onc) following

## 2020-01-29 NOTE — PROVIDER CONTACT NOTE (OTHER) - SITUATION
Patient has been having difficulty voiding, therefore bladder scan q8 ordered and pt was straight catheterized earlier this morning.

## 2020-01-29 NOTE — PROVIDER CONTACT NOTE (OTHER) - SITUATION
Patient had 1 unit PRBC, next CBC in AM at 0600, paged NP to verify CBC in morning was okay and none tonight was needed

## 2020-01-29 NOTE — CONSULT NOTE ADULT - SUBJECTIVE AND OBJECTIVE BOX
Patient is a 74y old  Male who presents with a chief complaint of lethargy (28 Jan 2020 12:33)      HPI:  Patient is a 74 year old male, with PMH of legally blind, dementia, CHF, DM, HTN, depression, CVA with residual left sided deficits and BPH, who comes in from home with his wife after being told his hemoglobin dropped from 10.8 to 7.2 and drop in hematocrit from 34 to 23.6. Patient went to PCP on Friday and had labs done and was told about results and was advised to come to the ED. Wife present at bedside also stated that patient has been lethargic for the past 2-3 months. Of note, patient had EGD done this past September and also a colonoscopy about 2 years ago which were both normal. Patient denies any chest pain, headache, dizziness, nausea, vomiting, abdominal pain. No signs of bleeding noted as per wife. Patient also was recently discharged from subacute rehab last month. (27 Jan 2020 23:45)       ROS:  Negative except for:    PAST MEDICAL & SURGICAL HISTORY:  Legally blind  BPH (benign prostatic hyperplasia)  Depression  Hyperlipidemia  CVA (cerebral vascular accident)  Diabetes  No significant past surgical history      SOCIAL HISTORY:    FAMILY HISTORY:  No pertinent family history in first degree relatives      MEDICATIONS  (STANDING):  albuterol/ipratropium for Nebulization 3 milliLiter(s) Nebulizer every 6 hours  aspirin enteric coated 81 milliGRAM(s) Oral daily  atorvastatin 80 milliGRAM(s) Oral at bedtime  cholecalciferol 400 Unit(s) Oral daily  clopidogrel Tablet 75 milliGRAM(s) Oral daily  dextrose 5%. 1000 milliLiter(s) (50 mL/Hr) IV Continuous <Continuous>  donepezil 10 milliGRAM(s) Oral at bedtime  enalapril 10 milliGRAM(s) Oral daily  famotidine    Tablet 20 milliGRAM(s) Oral daily  finasteride 5 milliGRAM(s) Oral daily  gabapentin 300 milliGRAM(s) Oral two times a day  heparin  Injectable 5000 Unit(s) SubCutaneous every 8 hours  insulin glargine Injectable (LANTUS) 4 Unit(s) SubCutaneous at bedtime  insulin lispro (HumaLOG) corrective regimen sliding scale   SubCutaneous three times a day before meals  iron sucrose IVPB 100 milliGRAM(s) IV Intermittent every 24 hours  metoprolol tartrate 25 milliGRAM(s) Oral two times a day  tamsulosin 0.8 milliGRAM(s) Oral at bedtime    MEDICATIONS  (PRN):  glucagon  Injectable 1 milliGRAM(s) IntraMuscular once PRN Glucose LESS THAN 70 milligrams/deciliter      Allergies    No Known Allergies    Intolerances        Vital Signs Last 24 Hrs  T(C): 37 (29 Jan 2020 05:10), Max: 37 (29 Jan 2020 05:10)  T(F): 98.6 (29 Jan 2020 05:10), Max: 98.6 (29 Jan 2020 05:10)  HR: 65 (29 Jan 2020 05:10) (57 - 69)  BP: 110/57 (29 Jan 2020 05:10) (103/53 - 120/56)  BP(mean): --  RR: 20 (29 Jan 2020 05:10) (16 - 22)  SpO2: 99% (29 Jan 2020 05:10) (98% - 100%)    PHYSICAL EXAM  General: adult in NAD  HEENT: clear oropharynx, anicteric sclera, pink conjunctiva  Neck: supple  CV: normal S1/S2 with no murmur rubs or gallops  Lungs: positive air movement b/l ant lungs,clear to auscultation, no wheezes, no rales  Abdomen: soft non-tender non-distended, no hepatosplenomegaly  Ext: no clubbing cyanosis or edema  Skin: no rashes and no petechiae  Neuro: alert and oriented X 4, no focal deficits      LABS:                          7.4    5.13  )-----------( 224      ( 29 Jan 2020 07:24 )             23.9         Mean Cell Volume : 95.6 fl  Mean Cell Hemoglobin : 29.6 pg  Mean Cell Hemoglobin Concentration : 31.0 gm/dL  Auto Neutrophil # : 3.60 K/uL  Auto Lymphocyte # : 0.86 K/uL  Auto Monocyte # : 0.39 K/uL  Auto Eosinophil # : 0.23 K/uL  Auto Basophil # : 0.04 K/uL  Auto Neutrophil % : 70.1 %  Auto Lymphocyte % : 16.8 %  Auto Monocyte % : 7.6 %  Auto Eosinophil % : 4.5 %  Auto Basophil % : 0.8 %      Serial CBC's  01-29 @ 07:24  Hct-23.9 / Hgb-7.4 / Plat-224 / RBC-2.50 / WBC-5.13  Serial CBC's  01-28 @ 13:31  Hct--- / Hgb--- / Plat--- / RBC-2.55 / WBC---  Serial CBC's  01-28 @ 07:04  Hct-25.1 / Hgb-7.6 / Plat-219 / RBC-2.61 / WBC-5.80  Serial CBC's  01-28 @ 00:07  Hct-24.0 / Hgb-7.7 / Plat-211 / RBC-2.52 / WBC-6.05  Serial CBC's  01-27 @ 14:14  Hct-34.2 / Hgb-10.6 / Plat-257 / RBC-3.56 / WBC-6.28      01-29    143  |  112<H>  |  22<H>  ----------------------------<  99  3.6   |  27  |  1.21    Ca    8.5      29 Jan 2020 07:24  Phos  3.4     01-28  Mg     2.0     01-28    TPro  8.8<H>  /  Alb  4.2  /  TBili  0.4  /  DBili  x   /  AST  26  /  ALT  58  /  AlkPhos  114  01-27      PT/INR - ( 27 Jan 2020 14:14 )   PT: 11.7 sec;   INR: 1.05 ratio         PTT - ( 27 Jan 2020 14:14 )  PTT:35.4 sec    Reticulocyte Percent: 1.8 % (01-28 @ 13:31)  Vitamin B12, Serum: 1037 pg/mL (01-28 @ 09:44)  Folate, Serum: 18.3 ng/mL (01-28 @ 09:44)  Ferritin, Serum: 148 ng/mL (01-28 @ 09:44)  Iron - Total Binding Capacity.: 234 ug/dL (01-28 @ 07:04)  Haptoglobin, Serum: 210 mg/dL (01.28.20 @ 18:34)  Lactate Dehydrogenase, Serum: 170 U/L (01.29.20 @ 07:24)                    BLOOD SMEAR INTERPRETATION:       RADIOLOGY & ADDITIONAL STUDIES:

## 2020-01-29 NOTE — PROGRESS NOTE ADULT - SUBJECTIVE AND OBJECTIVE BOX
HPI:  74 year old male, from home , with PMH of legally blind, dementia, CHF, DM, HTN, depression, CVA with residual left sided deficits and BPH,  EGD/Colonoscopy  2 yrs ago normal per wife. who presented to ED sent by PCP for  anemia (Hgb/Hct  7.2/23.6) and lethargy.   AM Hgb 7.4  Patient examined at bedside, resting comfortably with wife at bedside, denies pain, SOB or N&V.  Afebrile, VSS, NAD    OVERNIGHT EVENTS:  No new overnight events     REVIEW OF SYSTEMS:    CONSTITUTIONAL: No fever,   EYES: no acute visual disturbances  NECK: No pain or stiffness  RESPIRATORY: No cough; No shortness of breath  CARDIOVASCULAR: No chest pain, no palpitations  GASTROINTESTINAL: No pain. No nausea, vomiting or diarrhea   NEUROLOGICAL: No headache or numbness, no tremors  MUSCULOSKELETAL: No joint pain, no muscle pain  GENITOURINARY: no dysuria, no frequency, no hesitancy  PSYCHIATRY: no depression , no anxiety  ALL OTHER  ROS negative        Vital Signs Last 24 Hrs  T(C): 37 (2020 05:10), Max: 37 (2020 05:10)  T(F): 98.6 (2020 05:10), Max: 98.6 (2020 05:10)  HR: 63 (2020 09:23) (57 - 69)  BP: 110/57 (2020 05:10) (108/58 - 120/56)  BP(mean): --  RR: 20 (2020 05:10) (17 - 22)  SpO2: 98% (2020 09:23) (98% - 100%)    ________________________________________________  PHYSICAL EXAM:    GENERAL: NAD  HEENT: Normocephalic; conjunctivae and sclerae clear; moist mucous membranes;   NECK : supple, no JVD  CHEST/LUNG: Clear to auscultation bilaterally   HEART: S1 S2  regular  ABDOMEN: Soft, Nontender, Nondistended; Bowel sounds present  EXTREMITIES: no cyanosis; no LE edema; no calf tenderness  SKIN: warm and dry; no rash  NERVOUS SYSTEM:  Alert & Oriented x2; no new deficits    _________________________________________________  CURRENT MEDICATIONS:    MEDICATIONS  (STANDING):  albuterol/ipratropium for Nebulization 3 milliLiter(s) Nebulizer every 6 hours  aspirin enteric coated 81 milliGRAM(s) Oral daily  atorvastatin 80 milliGRAM(s) Oral at bedtime  cholecalciferol 400 Unit(s) Oral daily  clopidogrel Tablet 75 milliGRAM(s) Oral daily  dextrose 5%. 1000 milliLiter(s) (50 mL/Hr) IV Continuous <Continuous>  donepezil 10 milliGRAM(s) Oral at bedtime  enalapril 10 milliGRAM(s) Oral daily  famotidine    Tablet 20 milliGRAM(s) Oral daily  finasteride 5 milliGRAM(s) Oral daily  gabapentin 300 milliGRAM(s) Oral two times a day  heparin  Injectable 5000 Unit(s) SubCutaneous every 8 hours  insulin glargine Injectable (LANTUS) 4 Unit(s) SubCutaneous at bedtime  insulin lispro (HumaLOG) corrective regimen sliding scale   SubCutaneous three times a day before meals  iron sucrose IVPB 200 milliGRAM(s) IV Intermittent every 24 hours  metoprolol tartrate 25 milliGRAM(s) Oral two times a day  tamsulosin 0.8 milliGRAM(s) Oral at bedtime    MEDICATIONS  (PRN):  glucagon  Injectable 1 milliGRAM(s) IntraMuscular once PRN Glucose LESS THAN 70 milligrams/deciliter      __________________________________________________  LABS:                          7.4    5.13  )-----------( 224      ( 2020 07:24 )             23.9     -    143  |  112<H>  |  22<H>  ----------------------------<  99  3.6   |  27  |  1.21    Ca    8.5      2020 07:24  Phos  3.4       Mg     2.0     01-28    TPro  8.8<H>  /  Alb  4.2  /  TBili  0.4  /  DBili  x   /  AST  26  /  ALT  58  /  AlkPhos  114      PT/INR - ( 2020 14:14 )   PT: 11.7 sec;   INR: 1.05 ratio         PTT - ( 2020 14:14 )  PTT:35.4 sec  Urinalysis Basic - ( 2020 18:02 )    Color: Yellow / Appearance: Clear / S.015 / pH: x  Gluc: x / Ketone: Negative  / Bili: Negative / Urobili: Negative   Blood: x / Protein: 100 / Nitrite: Negative   Leuk Esterase: Negative / RBC: 0-2 /HPF / WBC 0-2 /HPF   Sq Epi: x / Non Sq Epi: Occasional /HPF / Bacteria: Few /HPF      CAPILLARY BLOOD GLUCOSE      POCT Blood Glucose.: 105 mg/dL (2020 08:02)  POCT Blood Glucose.: 128 mg/dL (2020 21:43)  POCT Blood Glucose.: 129 mg/dL (2020 16:21)      __________________________________________________  RADIOLOGY & ADDITIONAL TESTS:    Imaging Personally Reviewed:  YES    < from: CT Head No Cont (20 @ 20:33) >  IMPRESSION:    1)  chronic ischemic changes are diffusely noted throughout both hemispheres as well as within the brainstem and cerebellum. Similar findings were noted previously. No acute abnormality is suggested.  2)  clear sinuses and mastoids..    < end of copied text >    < from: Xray Chest 1 View- PORTABLE-Urgent (20 @ 21:38) >  IMPRESSION: New central infiltrates possibly congestive in etiology.    < end of copied text >      Consultant(s) Notes Reviewed:   YES     Plan of care was discussed with patient and /or primary care giver; all questions and concerns were addressed and care was aligned with patient's wishes.

## 2020-01-29 NOTE — PROGRESS NOTE ADULT - PROBLEM SELECTOR PLAN 7
Controlled  A1C 6.8  Pt takes Metformin at home  Continue with Lantus @ HS  Continue with sliding scale insulin coverage  Continue with glucose monitoring

## 2020-01-30 DIAGNOSIS — G93.40 ENCEPHALOPATHY, UNSPECIFIED: ICD-10-CM

## 2020-01-30 LAB
ALBUMIN SERPL ELPH-MCNC: 2.9 G/DL — LOW (ref 3.5–5)
ALP SERPL-CCNC: 77 U/L — SIGNIFICANT CHANGE UP (ref 40–120)
ALT FLD-CCNC: 28 U/L DA — SIGNIFICANT CHANGE UP (ref 10–60)
ANION GAP SERPL CALC-SCNC: 4 MMOL/L — LOW (ref 5–17)
AST SERPL-CCNC: 13 U/L — SIGNIFICANT CHANGE UP (ref 10–40)
BILIRUB SERPL-MCNC: 0.4 MG/DL — SIGNIFICANT CHANGE UP (ref 0.2–1.2)
BUN SERPL-MCNC: 23 MG/DL — HIGH (ref 7–18)
CALCIUM SERPL-MCNC: 8.7 MG/DL — SIGNIFICANT CHANGE UP (ref 8.4–10.5)
CHLORIDE SERPL-SCNC: 111 MMOL/L — HIGH (ref 96–108)
CO2 SERPL-SCNC: 29 MMOL/L — SIGNIFICANT CHANGE UP (ref 22–31)
CREAT SERPL-MCNC: 1.3 MG/DL — SIGNIFICANT CHANGE UP (ref 0.5–1.3)
GLUCOSE BLDC GLUCOMTR-MCNC: 124 MG/DL — HIGH (ref 70–99)
GLUCOSE BLDC GLUCOMTR-MCNC: 139 MG/DL — HIGH (ref 70–99)
GLUCOSE BLDC GLUCOMTR-MCNC: 206 MG/DL — HIGH (ref 70–99)
GLUCOSE BLDC GLUCOMTR-MCNC: 89 MG/DL — SIGNIFICANT CHANGE UP (ref 70–99)
GLUCOSE SERPL-MCNC: 82 MG/DL — SIGNIFICANT CHANGE UP (ref 70–99)
HCT VFR BLD CALC: 26.7 % — LOW (ref 39–50)
HGB BLD-MCNC: 8.3 G/DL — LOW (ref 13–17)
MCHC RBC-ENTMCNC: 29.4 PG — SIGNIFICANT CHANGE UP (ref 27–34)
MCHC RBC-ENTMCNC: 31.1 GM/DL — LOW (ref 32–36)
MCV RBC AUTO: 94.7 FL — SIGNIFICANT CHANGE UP (ref 80–100)
NRBC # BLD: 0 /100 WBCS — SIGNIFICANT CHANGE UP (ref 0–0)
PLATELET # BLD AUTO: 214 K/UL — SIGNIFICANT CHANGE UP (ref 150–400)
POTASSIUM SERPL-MCNC: 3.9 MMOL/L — SIGNIFICANT CHANGE UP (ref 3.5–5.3)
POTASSIUM SERPL-SCNC: 3.9 MMOL/L — SIGNIFICANT CHANGE UP (ref 3.5–5.3)
PROT SERPL-MCNC: 6.1 G/DL — SIGNIFICANT CHANGE UP (ref 6–8.3)
RBC # BLD: 2.82 M/UL — LOW (ref 4.2–5.8)
RBC # FLD: 16.6 % — HIGH (ref 10.3–14.5)
SODIUM SERPL-SCNC: 144 MMOL/L — SIGNIFICANT CHANGE UP (ref 135–145)
WBC # BLD: 6.62 K/UL — SIGNIFICANT CHANGE UP (ref 3.8–10.5)
WBC # FLD AUTO: 6.62 K/UL — SIGNIFICANT CHANGE UP (ref 3.8–10.5)

## 2020-01-30 PROCEDURE — 99222 1ST HOSP IP/OBS MODERATE 55: CPT

## 2020-01-30 PROCEDURE — 99233 SBSQ HOSP IP/OBS HIGH 50: CPT | Mod: GC

## 2020-01-30 PROCEDURE — 99221 1ST HOSP IP/OBS SF/LOW 40: CPT

## 2020-01-30 PROCEDURE — 93923 UPR/LXTR ART STDY 3+ LVLS: CPT | Mod: 26

## 2020-01-30 RX ADMIN — TAMSULOSIN HYDROCHLORIDE 0.8 MILLIGRAM(S): 0.4 CAPSULE ORAL at 21:51

## 2020-01-30 RX ADMIN — INSULIN GLARGINE 4 UNIT(S): 100 INJECTION, SOLUTION SUBCUTANEOUS at 21:51

## 2020-01-30 RX ADMIN — Medication 81 MILLIGRAM(S): at 12:18

## 2020-01-30 RX ADMIN — GABAPENTIN 300 MILLIGRAM(S): 400 CAPSULE ORAL at 17:22

## 2020-01-30 RX ADMIN — CLOPIDOGREL BISULFATE 75 MILLIGRAM(S): 75 TABLET, FILM COATED ORAL at 12:18

## 2020-01-30 RX ADMIN — Medication 1 APPLICATION(S): at 12:18

## 2020-01-30 RX ADMIN — Medication 3 MILLILITER(S): at 08:20

## 2020-01-30 RX ADMIN — GABAPENTIN 300 MILLIGRAM(S): 400 CAPSULE ORAL at 05:25

## 2020-01-30 RX ADMIN — FAMOTIDINE 20 MILLIGRAM(S): 10 INJECTION INTRAVENOUS at 12:18

## 2020-01-30 RX ADMIN — Medication 400 UNIT(S): at 12:18

## 2020-01-30 RX ADMIN — Medication 25 MILLIGRAM(S): at 17:23

## 2020-01-30 RX ADMIN — ATORVASTATIN CALCIUM 80 MILLIGRAM(S): 80 TABLET, FILM COATED ORAL at 21:51

## 2020-01-30 RX ADMIN — Medication 3 MILLILITER(S): at 14:59

## 2020-01-30 RX ADMIN — Medication 3 MILLILITER(S): at 21:12

## 2020-01-30 RX ADMIN — Medication 10 MILLIGRAM(S): at 05:25

## 2020-01-30 RX ADMIN — HEPARIN SODIUM 5000 UNIT(S): 5000 INJECTION INTRAVENOUS; SUBCUTANEOUS at 05:25

## 2020-01-30 RX ADMIN — FINASTERIDE 5 MILLIGRAM(S): 5 TABLET, FILM COATED ORAL at 12:18

## 2020-01-30 RX ADMIN — Medication 2: at 12:15

## 2020-01-30 RX ADMIN — DONEPEZIL HYDROCHLORIDE 10 MILLIGRAM(S): 10 TABLET, FILM COATED ORAL at 21:51

## 2020-01-30 RX ADMIN — HEPARIN SODIUM 5000 UNIT(S): 5000 INJECTION INTRAVENOUS; SUBCUTANEOUS at 21:51

## 2020-01-30 RX ADMIN — Medication 25 MILLIGRAM(S): at 05:25

## 2020-01-30 RX ADMIN — IRON SUCROSE 110 MILLIGRAM(S): 20 INJECTION, SOLUTION INTRAVENOUS at 22:23

## 2020-01-30 RX ADMIN — HEPARIN SODIUM 5000 UNIT(S): 5000 INJECTION INTRAVENOUS; SUBCUTANEOUS at 16:37

## 2020-01-30 NOTE — DIETITIAN INITIAL EVALUATION ADULT. - DIET TYPE
PTA - per pt. consumes PTA - per pt. consumes 3 meals daily DASH/TLC (sodium and cholesterol restricted diet)/consistent carbohydrate (evening snack)

## 2020-01-30 NOTE — DIETITIAN INITIAL EVALUATION ADULT. - PROBLEM SELECTOR PLAN 9
IMPROVE VTE Individual Risk Assessment  RISK                                                                Points  [  ] Previous VTE                                                  3  [  ] Thrombophilia                                               2  [  ] Lower limb paralysis                                      2        (unable to hold up >15 seconds)    [  ] Current Cancer                                              2         (within 6 months)  [X ] Immobilization > 24 hrs                                1  [  ] ICU/CCU stay > 24 hours                              1  [X ] Age > 60                                                      1    IMPROVE VTE Score  2    heparin for DVT prophylaxis

## 2020-01-30 NOTE — PROGRESS NOTE ADULT - PROBLEM SELECTOR PLAN 1
-sent by PCP for drop in hg,   -no acute sign or symptoms of bleeding   -cont  Venofer 200 mg QD x 5 days   -s/p 1 PRBC 1/29  -h/h 8.3/26.7 today   -stool occult negative   -Dr. Laughlin (Heme/Onc) following  -Mon CBC -family reports worsening mental status over past 2-3 months, worsening dementia vs polypharmacy   -CT head negative   -UA and culture negative   -afebrile, no leukocytosis   -waxing and waning mental status   -Neuro Dr. Mosley consulted

## 2020-01-30 NOTE — PROGRESS NOTE ADULT - PROBLEM SELECTOR PLAN 2
-likely due to poor PO intake  -Scr normalized   Avoid Nephrotoxic Meds/ Agents like (NSAIDs, IV contrast, Aminoglycosides such as gentamicin, -Gadolinium contrast, Phosphate containing enemas, etc.) -sent by PCP for drop in hg,   -no acute sign or symptoms of bleeding   -cont  Venofer 200 mg QD x 5 days   -s/p 1 PRBC 1/29  -h/h 8.3/26.7 today   -stool occult negative   -Dr. Laughlin (Heme/Onc) following  -Mon CBC

## 2020-01-30 NOTE — DIETITIAN INITIAL EVALUATION ADULT. - PROBLEM SELECTOR PLAN 3
noted to have elevated creatinine of 1.39 on admission  likely due to poor PO intake and dehydration vs post obstructive from BPH  will give 250cc IVF   monitor BMP  f/u urine lytes

## 2020-01-30 NOTE — DIETITIAN INITIAL EVALUATION ADULT. - ADD RECOMMEND
Add MVI/minerals/Vit. C 500mg BID for wound care & Zinc Sulfate 220mg once daily as needed as medically feasible

## 2020-01-30 NOTE — CONSULT NOTE ADULT - SUBJECTIVE AND OBJECTIVE BOX
Source: Patient, Chart    Reason for Consultation: Eval for ERIC    Chief Complaint: Sent for sleepiness    HPI:  Patient is a 74 year old male, with PMH of legally blind, dementia, CHF, DM, HTN, depression, CVA with residual left sided deficits and BPH, who comes in from home with his wife after being told his hemoglobin dropped from 10.8 to 7.2 and drop in hematocrit from 34 to 23.6. Patient went to PCP on Friday and had labs done and was told about results and was advised to come to the ED. Wife present at bedside also stated that patient has been lethargic for the past 2-3 months. Of note, patient had EGD done this past September and also a colonoscopy about 2 years ago which were both normal. Patient denies any chest pain, headache, dizziness, nausea, vomiting, abdominal pain. No signs of bleeding noted as per wife. Patient also was recently discharged from subacute rehab last month. (27 Jan 2020 23:45)    MEDICATIONS  (STANDING):  albuterol/ipratropium for Nebulization 3 milliLiter(s) Nebulizer every 6 hours  aspirin enteric coated 81 milliGRAM(s) Oral daily  atorvastatin 80 milliGRAM(s) Oral at bedtime  cholecalciferol 400 Unit(s) Oral daily  clopidogrel Tablet 75 milliGRAM(s) Oral daily  collagenase Ointment 1 Application(s) Topical daily  dextrose 5%. 1000 milliLiter(s) (50 mL/Hr) IV Continuous <Continuous>  donepezil 10 milliGRAM(s) Oral at bedtime  enalapril 10 milliGRAM(s) Oral daily  famotidine    Tablet 20 milliGRAM(s) Oral daily  finasteride 5 milliGRAM(s) Oral daily  gabapentin 300 milliGRAM(s) Oral two times a day  heparin  Injectable 5000 Unit(s) SubCutaneous every 8 hours  insulin glargine Injectable (LANTUS) 4 Unit(s) SubCutaneous at bedtime  insulin lispro (HumaLOG) corrective regimen sliding scale   SubCutaneous three times a day before meals  iron sucrose IVPB 200 milliGRAM(s) IV Intermittent every 24 hours  metoprolol tartrate 25 milliGRAM(s) Oral two times a day  tamsulosin 0.8 milliGRAM(s) Oral at bedtime    MEDICATIONS  (PRN):  glucagon  Injectable 1 milliGRAM(s) IntraMuscular once PRN Glucose LESS THAN 70 milligrams/deciliter      Allergies    No Known Allergies    Intolerances      PAST MEDICAL & SURGICAL HISTORY:  Legally blind  BPH (benign prostatic hyperplasia)  Depression  Hyperlipidemia  CVA (cerebral vascular accident)  Diabetes  No significant past surgical history      FAMILY HISTORY:  No pertinent family history in first degree relatives      SOCIAL HISTORY  Smoking History: Exsmoker  Alcohol: Denies  Drugs: Denies      T(C): 36.5 (01-30-20 @ 05:15), Max: 37.3 (01-29-20 @ 19:58)  HR: 63 (01-30-20 @ 09:03) (60 - 77)  BP: 129/73 (01-30-20 @ 05:15) (102/50 - 129/73)  RR: 18 (01-30-20 @ 05:15) (17 - 18)  SpO2: 98% (01-30-20 @ 09:03) (98% - 100%)    LABS:                        8.3    6.62  )-----------( 214      ( 30 Jan 2020 07:48 )             26.7     01-30    144  |  111<H>  |  23<H>  ----------------------------<  82  3.9   |  29  |  1.30    Ca    8.7      30 Jan 2020 07:48    TPro  6.1  /  Alb  2.9<L>  /  TBili  0.4  /  DBili  x   /  AST  13  /  ALT  28  /  AlkPhos  77  01-30    Microbiology  Culture Results:   No growth (01-28 @ 01:20)    RADIOLOGY & ADDITIONAL STUDIES: (My Reading)  Prominent perihilar infiltrates R > L Source: Patient, Chart, wife at bedside    Reason for Consultation: Eval for ERIC    Chief Complaint: Sent for lethargy    HPI:  Patient is a 74 year old male, with PMH of legally blind, dementia, CHF, DM, HTN, depression, CVA with residual left sided deficits and BPH, who comes in from home with his wife after being told his hemoglobin dropped from 10.8 to 7.2 and drop in hematocrit from 34 to 23.6. Patient went to PCP on Friday and had labs done and was told about results and was advised to come to the ED. Wife present at bedside also stated that patient has been lethargic for the past 2-3 months. Of note, patient had EGD done this past September and also a colonoscopy about 2 years ago which were both normal. Patient denies any chest pain, headache, dizziness, nausea, vomiting, abdominal pain. No signs of bleeding noted as per wife. Patient also was recently discharged from subacute rehab last month. (27 Jan 2020 23:45)    The patient has had 2 hospitalizations at [facility] recently, one for heart failure and second for a urinary tract infection. He has had 3 prior strokes. No witnessed apnea or snoring but the wife sleeps separately    MEDICATIONS  (STANDING):  albuterol/ipratropium for Nebulization 3 milliLiter(s) Nebulizer every 6 hours  aspirin enteric coated 81 milliGRAM(s) Oral daily  atorvastatin 80 milliGRAM(s) Oral at bedtime  cholecalciferol 400 Unit(s) Oral daily  clopidogrel Tablet 75 milliGRAM(s) Oral daily  collagenase Ointment 1 Application(s) Topical daily  dextrose 5%. 1000 milliLiter(s) (50 mL/Hr) IV Continuous <Continuous>  donepezil 10 milliGRAM(s) Oral at bedtime  enalapril 10 milliGRAM(s) Oral daily  famotidine    Tablet 20 milliGRAM(s) Oral daily  finasteride 5 milliGRAM(s) Oral daily  gabapentin 300 milliGRAM(s) Oral two times a day  heparin  Injectable 5000 Unit(s) SubCutaneous every 8 hours  insulin glargine Injectable (LANTUS) 4 Unit(s) SubCutaneous at bedtime  insulin lispro (HumaLOG) corrective regimen sliding scale   SubCutaneous three times a day before meals  iron sucrose IVPB 200 milliGRAM(s) IV Intermittent every 24 hours  metoprolol tartrate 25 milliGRAM(s) Oral two times a day  tamsulosin 0.8 milliGRAM(s) Oral at bedtime    MEDICATIONS  (PRN):  glucagon  Injectable 1 milliGRAM(s) IntraMuscular once PRN Glucose LESS THAN 70 milligrams/deciliter      Allergies    No Known Allergies    Intolerances      PAST MEDICAL & SURGICAL HISTORY:  Legally blind  BPH (benign prostatic hyperplasia)  Depression  Hyperlipidemia  CVA (cerebral vascular accident)  Diabetes  No significant past surgical history      FAMILY HISTORY:  No pertinent family history in first degree relatives      SOCIAL HISTORY  Smoking History: Exsmoker  Alcohol: Denies  Drugs: Denies      T(C): 36.5 (01-30-20 @ 05:15), Max: 37.3 (01-29-20 @ 19:58)  HR: 63 (01-30-20 @ 09:03) (60 - 77)  BP: 129/73 (01-30-20 @ 05:15) (102/50 - 129/73)  RR: 18 (01-30-20 @ 05:15) (17 - 18)  SpO2: 98% (01-30-20 @ 09:03) (98% - 100%)    LABS:                        8.3    6.62  )-----------( 214      ( 30 Jan 2020 07:48 )             26.7     01-30    144  |  111<H>  |  23<H>  ----------------------------<  82  3.9   |  29  |  1.30    Ca    8.7      30 Jan 2020 07:48    TPro  6.1  /  Alb  2.9<L>  /  TBili  0.4  /  DBili  x   /  AST  13  /  ALT  28  /  AlkPhos  77  01-30    Microbiology  Culture Results:   No growth (01-28 @ 01:20)    RADIOLOGY & ADDITIONAL STUDIES: (My Reading)  Prominent perihilar infiltrates R > L

## 2020-01-30 NOTE — PROGRESS NOTE ADULT - PROBLEM SELECTOR PROBLEM 3
Congestive heart failure, unspecified HF chronicity, unspecified heart failure type SUSHMA (acute kidney injury)

## 2020-01-30 NOTE — DIETITIAN INITIAL EVALUATION ADULT. - PROBLEM SELECTOR PLAN 4
patient has history of DM  takes metformin and Levemir 9 units at bedtime as home meds  will place on lantus 9 units at bedtime and SSI   f/u Hgb A1c  monitor blood sugars

## 2020-01-30 NOTE — PROGRESS NOTE ADULT - PROBLEM SELECTOR PLAN 5
Pt takes Metoprolol, ASA, Plavix, and Rosuvastatin   Continue with home regimen   Echo performed, awaiting read -Pt takes Metoprolol, ASA, Plavix, and Rosuvastatin   -cont with home regimen

## 2020-01-30 NOTE — DIETITIAN INITIAL EVALUATION ADULT. - PROBLEM SELECTOR PLAN 1
patient was told hemoglobin dropped from 10.8 to 7.2 by PCP   Hgb in ED noted to be 10.6; around baseline for patient   repeat CBC showed Hgb to be 7.7.   CT head negative for acute changes  FOBT negative  f/u anemia panel  f/u AM CBC  blood transfusion consent in chart if needed

## 2020-01-30 NOTE — PROGRESS NOTE ADULT - PROBLEM SELECTOR PLAN 7
Controlled  A1C 6.8  Pt takes Metformin at home  Continue with Lantus @ HS  Continue with sliding scale insulin coverage  Continue with glucose monitoring -on Metformin at home  -FS in range   -cont Lantus 4 units at bedtime   -cont HSSC   -haa1c- 6.8

## 2020-01-30 NOTE — DIETITIAN INITIAL EVALUATION ADULT. - FACTORS AFF FOOD INTAKE
weakness/lethargic, anemia, CVA with residual left sided deficits, CHF, diabetes, depression, dementia/other (specify)

## 2020-01-30 NOTE — PROGRESS NOTE ADULT - PROBLEM SELECTOR PLAN 6
Pt takes Flomax and Proscar at home  Continue with home regimen -cont home dose of Flomax and Proscar

## 2020-01-30 NOTE — PROGRESS NOTE ADULT - SUBJECTIVE AND OBJECTIVE BOX
74 year old male, with PMH of legally blind, dementia, CHF, DM, HTN, depression, CVA with residual left sided deficits and BPH, sent by PCP for drop in hg, patient had EGD done this past September and also a colonoscopy about 2 years ago which were both normal.   admitted for anemia, received 1 PRBC on 1/29 with improvement in hg, followed by Heme, started on IV Venofer   episodes of Lethargy during the day, Pulm Dr. Desai on board, neuro Dr Mosley consulted     OVERNIGHT EVENTS: no acute events overnight     REVIEW OF SYSTEMS:  NECK: No pain or stiffness  RESPIRATORY: No cough, SOB  CARDIOVASCULAR: No chest pain, palpitations  GASTROINTESTINAL: No abdominal pain. No nausea, vomiting, or diarrhea  GENITOURINARY: No dysuria  NEUROLOGICAL: No HA  MUSCULOSKELETAL: No joint pain or swelling; No muscle, back, or extremity pain    T(C): 36.5 (01-30-20 @ 05:15), Max: 37.3 (01-29-20 @ 19:58)  HR: 63 (01-30-20 @ 09:03) (60 - 77)  BP: 129/73 (01-30-20 @ 05:15) (102/50 - 129/73)  RR: 18 (01-30-20 @ 05:15) (17 - 18)  SpO2: 98% (01-30-20 @ 09:03) (98% - 100%)  Wt(kg): --Vital Signs Last 24 Hrs  T(C): 36.5 (30 Jan 2020 05:15), Max: 37.3 (29 Jan 2020 19:58)  T(F): 97.7 (30 Jan 2020 05:15), Max: 99.2 (29 Jan 2020 19:58)  HR: 63 (30 Jan 2020 09:03) (60 - 77)  BP: 129/73 (30 Jan 2020 05:15) (102/50 - 129/73)  BP(mean): --  RR: 18 (30 Jan 2020 05:15) (17 - 18)  SpO2: 98% (30 Jan 2020 09:03) (98% - 100%)    MEDICATIONS  (STANDING):  albuterol/ipratropium for Nebulization 3 milliLiter(s) Nebulizer every 6 hours  aspirin enteric coated 81 milliGRAM(s) Oral daily  atorvastatin 80 milliGRAM(s) Oral at bedtime  cholecalciferol 400 Unit(s) Oral daily  clopidogrel Tablet 75 milliGRAM(s) Oral daily  collagenase Ointment 1 Application(s) Topical daily  dextrose 5%. 1000 milliLiter(s) (50 mL/Hr) IV Continuous <Continuous>  donepezil 10 milliGRAM(s) Oral at bedtime  enalapril 10 milliGRAM(s) Oral daily  famotidine    Tablet 20 milliGRAM(s) Oral daily  finasteride 5 milliGRAM(s) Oral daily  gabapentin 300 milliGRAM(s) Oral two times a day  heparin  Injectable 5000 Unit(s) SubCutaneous every 8 hours  insulin glargine Injectable (LANTUS) 4 Unit(s) SubCutaneous at bedtime  insulin lispro (HumaLOG) corrective regimen sliding scale   SubCutaneous three times a day before meals  iron sucrose IVPB 200 milliGRAM(s) IV Intermittent every 24 hours  metoprolol tartrate 25 milliGRAM(s) Oral two times a day  tamsulosin 0.8 milliGRAM(s) Oral at bedtime    MEDICATIONS  (PRN):  glucagon  Injectable 1 milliGRAM(s) IntraMuscular once PRN Glucose LESS THAN 70 milligrams/deciliter      PHYSICAL EXAM:  GENERAL: NAD, lethargic at times   ENMT: Moist mucous membranes  NECK: Supple, No JVD   CHEST/LUNG: Clear to auscultation bilaterally; No rales, rhonchi, wheezing, or rubs  HEART: S1, S2, Regular rate and rhythm  ABDOMEN: Soft, Nontender, Nondistended; Bowel sounds present  NEURO: Alert & Oriented X2-3  EXTREMITIES: No LE edema, no calf tenderness    Consultant(s) Notes Reviewed:  [x ] YES  [ ] NO  Care Discussed with Consultants/Other Providers [ x] YES  [ ] NO    LABS:                        8.3    6.62  )-----------( 214      ( 30 Jan 2020 07:48 )             26.7     01-30    144  |  111<H>  |  23<H>  ----------------------------<  82  3.9   |  29  |  1.30    Ca    8.7      30 Jan 2020 07:48    TPro  6.1  /  Alb  2.9<L>  /  TBili  0.4  /  DBili  x   /  AST  13  /  ALT  28  /  AlkPhos  77  01-30        CAPILLARY BLOOD GLUCOSE      POCT Blood Glucose.: 206 mg/dL (30 Jan 2020 11:26)  POCT Blood Glucose.: 89 mg/dL (30 Jan 2020 07:54)  POCT Blood Glucose.: 154 mg/dL (29 Jan 2020 21:12)  POCT Blood Glucose.: 172 mg/dL (29 Jan 2020 16:40)    RADIOLOGY & ADDITIONAL TESTS:    < from: CT Head No Cont (01.27.20 @ 20:33) >    EXAM:  CT BRAIN                            PROCEDURE DATE:  01/27/2020          INTERPRETATION:  INDICATION:  Lethargy  TECHNIQUE:  A non contrast 2.5mm axial CT study of the brain was performed from skull base to vertex. Coronal and sagittal reformations were generated from the axial data.  COMPARISON EXAMINATION:  CT dated 4/17/2018    FINDINGS:      HEMISPHERES:  Chronic small vessel ischemic changes are again noted throughout both hemispheres with atrophy. No acute infarct or hemorrhage is suggested.  VENTRICLES:  Ex vacuo enlargement is noted  POSTERIOR FOSSA:  Chronic ischemic changes are noted in the brainstem and in both cerebellar hemispheres with atrophic change as well.  EXTRACEREBRAL SPACES:  No subdural or epidural collections are noted.  SKULL BASE AND CALVARIUM:  Appears intact.  No fracture or destructive lesion is identified.  SINUSES AND MASTOIDS:  Clear.  MISCELLANEOUS:  No orbital or suprasellar abnormality noted.      IMPRESSION:    1)  chronic ischemic changes are diffusely noted throughout both hemispheres as well as within the brainstem and cerebellum. Similar findings were noted previously. No acute abnormality is suggested.  2)  clear sinuses and mastoids..    < end of copied text >      Imaging Personally Reviewed:  [ ] YES  [ ] NO 74 year old male, with PMH of legally blind, dementia, CHF, DM, HTN, depression, CVA with residual left sided deficits and BPH, sent by PCP for drop in hg, patient had EGD done this past September and also a colonoscopy about 2 years ago which were both normal.   admitted for anemia, received 1 PRBC on 1/29 with improvement in hg, followed by Heme, started on IV Venofer   episodes of Lethargy during the day, Pulm Dr. Desai on board, neuro Dr Mosley consulted   evaluated by PT, recommends HENOK, pending ins auth for HENOK placement     OVERNIGHT EVENTS: no acute events overnight     REVIEW OF SYSTEMS:  NECK: No pain or stiffness  RESPIRATORY: No cough, SOB  CARDIOVASCULAR: No chest pain, palpitations  GASTROINTESTINAL: No abdominal pain. No nausea, vomiting, or diarrhea  GENITOURINARY: No dysuria  NEUROLOGICAL: No HA  MUSCULOSKELETAL: No joint pain or swelling; No muscle, back, or extremity pain    T(C): 36.5 (01-30-20 @ 05:15), Max: 37.3 (01-29-20 @ 19:58)  HR: 63 (01-30-20 @ 09:03) (60 - 77)  BP: 129/73 (01-30-20 @ 05:15) (102/50 - 129/73)  RR: 18 (01-30-20 @ 05:15) (17 - 18)  SpO2: 98% (01-30-20 @ 09:03) (98% - 100%)  Wt(kg): --Vital Signs Last 24 Hrs  T(C): 36.5 (30 Jan 2020 05:15), Max: 37.3 (29 Jan 2020 19:58)  T(F): 97.7 (30 Jan 2020 05:15), Max: 99.2 (29 Jan 2020 19:58)  HR: 63 (30 Jan 2020 09:03) (60 - 77)  BP: 129/73 (30 Jan 2020 05:15) (102/50 - 129/73)  BP(mean): --  RR: 18 (30 Jan 2020 05:15) (17 - 18)  SpO2: 98% (30 Jan 2020 09:03) (98% - 100%)    MEDICATIONS  (STANDING):  albuterol/ipratropium for Nebulization 3 milliLiter(s) Nebulizer every 6 hours  aspirin enteric coated 81 milliGRAM(s) Oral daily  atorvastatin 80 milliGRAM(s) Oral at bedtime  cholecalciferol 400 Unit(s) Oral daily  clopidogrel Tablet 75 milliGRAM(s) Oral daily  collagenase Ointment 1 Application(s) Topical daily  dextrose 5%. 1000 milliLiter(s) (50 mL/Hr) IV Continuous <Continuous>  donepezil 10 milliGRAM(s) Oral at bedtime  enalapril 10 milliGRAM(s) Oral daily  famotidine    Tablet 20 milliGRAM(s) Oral daily  finasteride 5 milliGRAM(s) Oral daily  gabapentin 300 milliGRAM(s) Oral two times a day  heparin  Injectable 5000 Unit(s) SubCutaneous every 8 hours  insulin glargine Injectable (LANTUS) 4 Unit(s) SubCutaneous at bedtime  insulin lispro (HumaLOG) corrective regimen sliding scale   SubCutaneous three times a day before meals  iron sucrose IVPB 200 milliGRAM(s) IV Intermittent every 24 hours  metoprolol tartrate 25 milliGRAM(s) Oral two times a day  tamsulosin 0.8 milliGRAM(s) Oral at bedtime    MEDICATIONS  (PRN):  glucagon  Injectable 1 milliGRAM(s) IntraMuscular once PRN Glucose LESS THAN 70 milligrams/deciliter      PHYSICAL EXAM:  GENERAL: NAD, lethargic at times   ENMT: Moist mucous membranes  NECK: Supple, No JVD   CHEST/LUNG: Clear to auscultation bilaterally; No rales, rhonchi, wheezing, or rubs  HEART: S1, S2, Regular rate and rhythm  ABDOMEN: Soft, Nontender, Nondistended; Bowel sounds present  NEURO: Alert & Oriented X2-3  EXTREMITIES: No LE edema, no calf tenderness    Consultant(s) Notes Reviewed:  [x ] YES  [ ] NO  Care Discussed with Consultants/Other Providers [ x] YES  [ ] NO    LABS:                        8.3    6.62  )-----------( 214      ( 30 Jan 2020 07:48 )             26.7     01-30    144  |  111<H>  |  23<H>  ----------------------------<  82  3.9   |  29  |  1.30    Ca    8.7      30 Jan 2020 07:48    TPro  6.1  /  Alb  2.9<L>  /  TBili  0.4  /  DBili  x   /  AST  13  /  ALT  28  /  AlkPhos  77  01-30        CAPILLARY BLOOD GLUCOSE      POCT Blood Glucose.: 206 mg/dL (30 Jan 2020 11:26)  POCT Blood Glucose.: 89 mg/dL (30 Jan 2020 07:54)  POCT Blood Glucose.: 154 mg/dL (29 Jan 2020 21:12)  POCT Blood Glucose.: 172 mg/dL (29 Jan 2020 16:40)    RADIOLOGY & ADDITIONAL TESTS:    < from: CT Head No Cont (01.27.20 @ 20:33) >    EXAM:  CT BRAIN                            PROCEDURE DATE:  01/27/2020          INTERPRETATION:  INDICATION:  Lethargy  TECHNIQUE:  A non contrast 2.5mm axial CT study of the brain was performed from skull base to vertex. Coronal and sagittal reformations were generated from the axial data.  COMPARISON EXAMINATION:  CT dated 4/17/2018    FINDINGS:      HEMISPHERES:  Chronic small vessel ischemic changes are again noted throughout both hemispheres with atrophy. No acute infarct or hemorrhage is suggested.  VENTRICLES:  Ex vacuo enlargement is noted  POSTERIOR FOSSA:  Chronic ischemic changes are noted in the brainstem and in both cerebellar hemispheres with atrophic change as well.  EXTRACEREBRAL SPACES:  No subdural or epidural collections are noted.  SKULL BASE AND CALVARIUM:  Appears intact.  No fracture or destructive lesion is identified.  SINUSES AND MASTOIDS:  Clear.  MISCELLANEOUS:  No orbital or suprasellar abnormality noted.      IMPRESSION:    1)  chronic ischemic changes are diffusely noted throughout both hemispheres as well as within the brainstem and cerebellum. Similar findings were noted previously. No acute abnormality is suggested.  2)  clear sinuses and mastoids..    < end of copied text >      Imaging Personally Reviewed:  [ ] YES  [ ] NO

## 2020-01-30 NOTE — PROGRESS NOTE ADULT - PROBLEM SELECTOR PLAN 3
CXR noted above  IVF discontinued  No dyspnea  Afebrile  WBC 5.13  Sats % on supplemental O2  Echo performed, awating read  Continue with PRN Duoneb treatments -likely due to poor PO intake  -Scr normalized   -Avoid Nephrotoxic Meds/ Agents like (NSAIDs, IV contrast, Aminoglycosides such as gentamicin, -Gadolinium contrast, Phosphate containing enemas, etc.)  -Mon BMP -likely due to poor PO intake  -Scr normalized   -Avoid Nephrotoxic Meds/ Agents like (NSAIDs, IV contrast, Aminoglycosides such as gentamicin, -Gadolinium contrast, Phosphate containing enemas, etc.)  -Monitor BMP

## 2020-01-30 NOTE — DIETITIAN INITIAL EVALUATION ADULT. - OTHER INFO
Patient from home lives with wife. Visited pt. alert & awake, wife at bedside, reports pt. po intake varied, "good to fair" >1week, needs assistance with feeding, noted pt. becoming weaker, denies nausea/vomiting or diarrhea, able to chew/swallow without difficulty PTA, stated pt.  Lbs >2months & stable? with mostly being bedbound & complaint with "diabetic" medications, recently pt. becoming resistance to Patient from home lives with wife. Visited pt. alert & awake, wife at bedside, reports pt. po intake varied, "good to fair" >1week, needs assistance with feeding, noted pt. becoming weaker, denies nausea/vomiting or diarrhea, able to chew/swallow without difficulty PTA, stated pt.  Lbs >2months & stable? with mostly being bedbound & complaint with "diabetic" medications, recently pt. becoming resistance to with follow any type meal plan Patient from home lives with wife. Visited pt. alert & awake, wife at bedside, reports pt. po intake varied, "good to fair" >1week, needs assistance with feeding, noted pt. becoming weaker, denies nausea/vomiting or diarrhea, able to chew/swallow without difficulty PTA, stated pt.  Lbs >2months & stable? with mostly being bedbound & complaint with "diabetic" medications, recently pt. wants very little restriction to his meal plan, verbally reinforced "healthy eating habits" in relation to "Diabetes" also received diabetes education from nursing noted. Presently pt. consuming >50% of meals with feeding assistance & tolerating, wife provided food preferences, updated kitchen, per flow sheet intake 85% noted, diabetic right foot/hallux ulcer with wound care & followed by podiatry, discussed with NP.

## 2020-01-30 NOTE — DIETITIAN INITIAL EVALUATION ADULT. - PROBLEM SELECTOR PLAN 2
as per wife, patient has been lethargic for the past 2-3 months  may be due to worsening dementia vs polypharmacy   monitor vitals and f/u AM CBC

## 2020-01-30 NOTE — DIETITIAN INITIAL EVALUATION ADULT. - PERTINENT LABORATORY DATA
01-30 Na144 mmol/L Glu 82 mg/dL K+ 3.9 mmol/L Cr  1.30 mg/dL BUN 23 mg/dL<H> 01-28 Phos 3.4 mg/dL 01-30 Alb 2.9 g/dL<L> 01-28 [id number] 6.8 %<H> 01-28 Chol 94 mg/dL LDL 28 mg/dL HDL 53 mg/dL Trig 66 mg/dL

## 2020-01-30 NOTE — CONSULT NOTE ADULT - ASSESSMENT
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 Medication such as gabapentin could worsen respiratory drive and may be discontinued if not too risky to do so. Absent any infections that could be treated, confirmation of sleep apnea and correction with BiPAP may be employed. Because sleep apnea testing is not done in the hospital, will review pulse hemoglobin oxygen saturation and  recommend respiratory therapy request for auto adjustable BiPAP
Patient is a 74 year old male, with PMH of legally blind, dementia, CHF, DM, HTN, depression, CVA with residual left sided deficits and BPH, who comes in from home with his wife after being told his hemoglobin dropped from 10.8 to 7.2 and drop in hematocrit from 34 to 23.6. Patient went to PCP on Friday and had labs done and was told about results and was advised to come to the ED. Wife present at bedside also stated that patient has been lethargic for the past 2-3 months. Of note, patient had EGD done this past September and also a colonoscopy about 2 years ago which were both normal. Patient denies any chest pain, headache, dizziness, nausea, vomiting, abdominal pain. No signs of bleeding noted as per wife. Patient also was recently discharged from subacute rehab last month. (27 Jan 2020 23:45)    Problem # 1 Anemia with high normal MCV  -Etiology is multifactorial  -Iron studies are consistent with inflammation (ferritin > 100 but < 300) with iron deficiency (Fe sat 17%)  -Cr improved with hydration thus renal insufficiency is not an etiology as well as normal B12 /Folate   -Hemolysis is not an etiology as the Haptoglobin / LDH are normal  -Due to a low retic count the pathophysiology is hypoproliferation  -Agree with MGUS evaluation to evaluate for a plasma cell dyscrasia  -Urine culture is neg but would check blood cultures to evaluate for a systemic infection  -Recommend venofer 200 mg x five days that can be continues as an outpatient if discharged   -Following iron repletion would determine if Hgb rise is not sufficient (< 10) and then recommend bone marrow evaluation      Thank you     Will follow       Myron Laughlin M.D.
Anemia likely multifactorial with chronic disease  Severe Aortic stenosis and moderate MR  Lethargy/Debility  Prior CVA    His current lethargy is more likely related to his debility and clinical deterioration the last few months with 2 recent admissions to [facility] and discharges to rehab with minimal improvement.  It is possible that he has ERIC given his metabolic syndrome and multiple strokes, however given his current debility and deterioration , it is unlikely that he will be able to go for a sleep study nor is it clear that he would tolerate CPAP as he appears to be at increased risk for aspiration.  At this point, would just continue aggressive PT/OT as tolerated and if he functionally recovers, can obtain a home sleep study. He has a pulmonologist- Dr Ranulfo Love and his PCP recently put in a follow up referral.  In the intermittent can consider stopping the neurontin.  Continue to optimize cardiac medications as he is at high risk for heart failure given prior admission at [facility] and his severe aortic stenosis with moderate MR and congestive changes appreciated on admission film  Prior PFTs showed restriction likely from his debility. Limited utility for nebulizers.  Otherwise he may be a candidate for hospice given his persistent functional decline.

## 2020-01-30 NOTE — PROGRESS NOTE ADULT - PROBLEM SELECTOR PROBLEM 4
Altered mental status, unspecified altered mental status type Congestive heart failure, unspecified HF chronicity, unspecified heart failure type

## 2020-01-30 NOTE — DIETITIAN INITIAL EVALUATION ADULT. - PERTINENT MEDS FT
MEDICATIONS  (STANDING):  albuterol/ipratropium for Nebulization 3 milliLiter(s) Nebulizer every 6 hours  aspirin enteric coated 81 milliGRAM(s) Oral daily  atorvastatin 80 milliGRAM(s) Oral at bedtime  cholecalciferol 400 Unit(s) Oral daily  clopidogrel Tablet 75 milliGRAM(s) Oral daily  collagenase Ointment 1 Application(s) Topical daily  dextrose 5%. 1000 milliLiter(s) (50 mL/Hr) IV Continuous <Continuous>  donepezil 10 milliGRAM(s) Oral at bedtime  enalapril 10 milliGRAM(s) Oral daily  famotidine    Tablet 20 milliGRAM(s) Oral daily  finasteride 5 milliGRAM(s) Oral daily  gabapentin 300 milliGRAM(s) Oral two times a day  heparin  Injectable 5000 Unit(s) SubCutaneous every 8 hours  insulin glargine Injectable (LANTUS) 4 Unit(s) SubCutaneous at bedtime  insulin lispro (HumaLOG) corrective regimen sliding scale   SubCutaneous three times a day before meals  iron sucrose IVPB 200 milliGRAM(s) IV Intermittent every 24 hours  metoprolol tartrate 25 milliGRAM(s) Oral two times a day  tamsulosin 0.8 milliGRAM(s) Oral at bedtime    MEDICATIONS  (PRN):  glucagon  Injectable 1 milliGRAM(s) IntraMuscular once PRN Glucose LESS THAN 70 milligrams/deciliter

## 2020-01-30 NOTE — PROGRESS NOTE ADULT - PROBLEM SELECTOR PLAN 8
Lipid panel controlled  Pt takes statin at home  Continue with home regimen -Lipid panel unremarkable   -cont statin

## 2020-01-31 ENCOUNTER — TRANSCRIPTION ENCOUNTER (OUTPATIENT)
Age: 75
End: 2020-01-31

## 2020-01-31 VITALS — OXYGEN SATURATION: 98 %

## 2020-01-31 LAB
ALBUMIN SERPL ELPH-MCNC: 2.8 G/DL — LOW (ref 3.5–5)
ALP SERPL-CCNC: 79 U/L — SIGNIFICANT CHANGE UP (ref 40–120)
ALT FLD-CCNC: 24 U/L DA — SIGNIFICANT CHANGE UP (ref 10–60)
ANION GAP SERPL CALC-SCNC: 5 MMOL/L — SIGNIFICANT CHANGE UP (ref 5–17)
AST SERPL-CCNC: 12 U/L — SIGNIFICANT CHANGE UP (ref 10–40)
BILIRUB SERPL-MCNC: 0.4 MG/DL — SIGNIFICANT CHANGE UP (ref 0.2–1.2)
BUN SERPL-MCNC: 22 MG/DL — HIGH (ref 7–18)
CALCIUM SERPL-MCNC: 8.8 MG/DL — SIGNIFICANT CHANGE UP (ref 8.4–10.5)
CHLORIDE SERPL-SCNC: 110 MMOL/L — HIGH (ref 96–108)
CO2 SERPL-SCNC: 28 MMOL/L — SIGNIFICANT CHANGE UP (ref 22–31)
CREAT SERPL-MCNC: 1.29 MG/DL — SIGNIFICANT CHANGE UP (ref 0.5–1.3)
GLUCOSE BLDC GLUCOMTR-MCNC: 155 MG/DL — HIGH (ref 70–99)
GLUCOSE BLDC GLUCOMTR-MCNC: 93 MG/DL — SIGNIFICANT CHANGE UP (ref 70–99)
GLUCOSE SERPL-MCNC: 95 MG/DL — SIGNIFICANT CHANGE UP (ref 70–99)
HCT VFR BLD CALC: 26.4 % — LOW (ref 39–50)
HGB BLD-MCNC: 8.3 G/DL — LOW (ref 13–17)
MCHC RBC-ENTMCNC: 29.6 PG — SIGNIFICANT CHANGE UP (ref 27–34)
MCHC RBC-ENTMCNC: 31.4 GM/DL — LOW (ref 32–36)
MCV RBC AUTO: 94.3 FL — SIGNIFICANT CHANGE UP (ref 80–100)
NRBC # BLD: 0 /100 WBCS — SIGNIFICANT CHANGE UP (ref 0–0)
PLATELET # BLD AUTO: 212 K/UL — SIGNIFICANT CHANGE UP (ref 150–400)
POTASSIUM SERPL-MCNC: 3.9 MMOL/L — SIGNIFICANT CHANGE UP (ref 3.5–5.3)
POTASSIUM SERPL-SCNC: 3.9 MMOL/L — SIGNIFICANT CHANGE UP (ref 3.5–5.3)
PROT SERPL-MCNC: 6.1 G/DL — SIGNIFICANT CHANGE UP (ref 6–8.3)
RBC # BLD: 2.8 M/UL — LOW (ref 4.2–5.8)
RBC # FLD: 16.3 % — HIGH (ref 10.3–14.5)
SODIUM SERPL-SCNC: 143 MMOL/L — SIGNIFICANT CHANGE UP (ref 135–145)
WBC # BLD: 5.97 K/UL — SIGNIFICANT CHANGE UP (ref 3.8–10.5)
WBC # FLD AUTO: 5.97 K/UL — SIGNIFICANT CHANGE UP (ref 3.8–10.5)

## 2020-01-31 PROCEDURE — P9040: CPT

## 2020-01-31 PROCEDURE — 97110 THERAPEUTIC EXERCISES: CPT

## 2020-01-31 PROCEDURE — 86850 RBC ANTIBODY SCREEN: CPT

## 2020-01-31 PROCEDURE — 82306 VITAMIN D 25 HYDROXY: CPT

## 2020-01-31 PROCEDURE — 84165 PROTEIN E-PHORESIS SERUM: CPT

## 2020-01-31 PROCEDURE — 82607 VITAMIN B-12: CPT

## 2020-01-31 PROCEDURE — 97163 PT EVAL HIGH COMPLEX 45 MIN: CPT

## 2020-01-31 PROCEDURE — 86923 COMPATIBILITY TEST ELECTRIC: CPT

## 2020-01-31 PROCEDURE — 83036 HEMOGLOBIN GLYCOSYLATED A1C: CPT

## 2020-01-31 PROCEDURE — 84100 ASSAY OF PHOSPHORUS: CPT

## 2020-01-31 PROCEDURE — 81001 URINALYSIS AUTO W/SCOPE: CPT

## 2020-01-31 PROCEDURE — 82746 ASSAY OF FOLIC ACID SERUM: CPT

## 2020-01-31 PROCEDURE — 71045 X-RAY EXAM CHEST 1 VIEW: CPT

## 2020-01-31 PROCEDURE — 97530 THERAPEUTIC ACTIVITIES: CPT

## 2020-01-31 PROCEDURE — 93923 UPR/LXTR ART STDY 3+ LVLS: CPT

## 2020-01-31 PROCEDURE — 86900 BLOOD TYPING SEROLOGIC ABO: CPT

## 2020-01-31 PROCEDURE — 36430 TRANSFUSION BLD/BLD COMPNT: CPT

## 2020-01-31 PROCEDURE — 85730 THROMBOPLASTIN TIME PARTIAL: CPT

## 2020-01-31 PROCEDURE — 94760 N-INVAS EAR/PLS OXIMETRY 1: CPT

## 2020-01-31 PROCEDURE — 99239 HOSP IP/OBS DSCHRG MGMT >30: CPT | Mod: GC

## 2020-01-31 PROCEDURE — 80061 LIPID PANEL: CPT

## 2020-01-31 PROCEDURE — 99232 SBSQ HOSP IP/OBS MODERATE 35: CPT

## 2020-01-31 PROCEDURE — 83735 ASSAY OF MAGNESIUM: CPT

## 2020-01-31 PROCEDURE — 85027 COMPLETE CBC AUTOMATED: CPT

## 2020-01-31 PROCEDURE — 84443 ASSAY THYROID STIM HORMONE: CPT

## 2020-01-31 PROCEDURE — 93306 TTE W/DOPPLER COMPLETE: CPT

## 2020-01-31 PROCEDURE — 83540 ASSAY OF IRON: CPT

## 2020-01-31 PROCEDURE — 86803 HEPATITIS C AB TEST: CPT

## 2020-01-31 PROCEDURE — 70450 CT HEAD/BRAIN W/O DYE: CPT

## 2020-01-31 PROCEDURE — 85045 AUTOMATED RETICULOCYTE COUNT: CPT

## 2020-01-31 PROCEDURE — 80053 COMPREHEN METABOLIC PANEL: CPT

## 2020-01-31 PROCEDURE — 83935 ASSAY OF URINE OSMOLALITY: CPT

## 2020-01-31 PROCEDURE — 86901 BLOOD TYPING SEROLOGIC RH(D): CPT

## 2020-01-31 PROCEDURE — 82728 ASSAY OF FERRITIN: CPT

## 2020-01-31 PROCEDURE — 84300 ASSAY OF URINE SODIUM: CPT

## 2020-01-31 PROCEDURE — 83010 ASSAY OF HAPTOGLOBIN QUANT: CPT

## 2020-01-31 PROCEDURE — 94640 AIRWAY INHALATION TREATMENT: CPT

## 2020-01-31 PROCEDURE — 87086 URINE CULTURE/COLONY COUNT: CPT

## 2020-01-31 PROCEDURE — 82272 OCCULT BLD FECES 1-3 TESTS: CPT

## 2020-01-31 PROCEDURE — 93005 ELECTROCARDIOGRAM TRACING: CPT

## 2020-01-31 PROCEDURE — 83615 LACTATE (LD) (LDH) ENZYME: CPT

## 2020-01-31 PROCEDURE — 99285 EMERGENCY DEPT VISIT HI MDM: CPT | Mod: 25

## 2020-01-31 PROCEDURE — 86334 IMMUNOFIX E-PHORESIS SERUM: CPT

## 2020-01-31 PROCEDURE — 82962 GLUCOSE BLOOD TEST: CPT

## 2020-01-31 PROCEDURE — 80048 BASIC METABOLIC PNL TOTAL CA: CPT

## 2020-01-31 PROCEDURE — 82570 ASSAY OF URINE CREATININE: CPT

## 2020-01-31 PROCEDURE — 36415 COLL VENOUS BLD VENIPUNCTURE: CPT

## 2020-01-31 PROCEDURE — 84155 ASSAY OF PROTEIN SERUM: CPT

## 2020-01-31 PROCEDURE — 83550 IRON BINDING TEST: CPT

## 2020-01-31 PROCEDURE — 85610 PROTHROMBIN TIME: CPT

## 2020-01-31 RX ORDER — COLLAGENASE CLOSTRIDIUM HIST. 250 UNIT/G
1 OINTMENT (GRAM) TOPICAL
Qty: 0 | Refills: 0 | DISCHARGE
Start: 2020-01-31

## 2020-01-31 RX ORDER — CHOLECALCIFEROL (VITAMIN D3) 125 MCG
400 CAPSULE ORAL
Qty: 0 | Refills: 0 | DISCHARGE
Start: 2020-01-31

## 2020-01-31 RX ADMIN — GABAPENTIN 300 MILLIGRAM(S): 400 CAPSULE ORAL at 05:42

## 2020-01-31 RX ADMIN — Medication 3 MILLILITER(S): at 08:19

## 2020-01-31 RX ADMIN — CLOPIDOGREL BISULFATE 75 MILLIGRAM(S): 75 TABLET, FILM COATED ORAL at 11:22

## 2020-01-31 RX ADMIN — HEPARIN SODIUM 5000 UNIT(S): 5000 INJECTION INTRAVENOUS; SUBCUTANEOUS at 05:42

## 2020-01-31 RX ADMIN — Medication 25 MILLIGRAM(S): at 05:42

## 2020-01-31 RX ADMIN — Medication 400 UNIT(S): at 11:22

## 2020-01-31 RX ADMIN — Medication 3 MILLILITER(S): at 15:37

## 2020-01-31 RX ADMIN — FAMOTIDINE 20 MILLIGRAM(S): 10 INJECTION INTRAVENOUS at 11:22

## 2020-01-31 RX ADMIN — Medication 10 MILLIGRAM(S): at 05:42

## 2020-01-31 RX ADMIN — Medication 1 APPLICATION(S): at 11:22

## 2020-01-31 RX ADMIN — Medication 1: at 12:52

## 2020-01-31 RX ADMIN — FINASTERIDE 5 MILLIGRAM(S): 5 TABLET, FILM COATED ORAL at 11:22

## 2020-01-31 RX ADMIN — HEPARIN SODIUM 5000 UNIT(S): 5000 INJECTION INTRAVENOUS; SUBCUTANEOUS at 13:05

## 2020-01-31 RX ADMIN — Medication 81 MILLIGRAM(S): at 11:22

## 2020-01-31 NOTE — DISCHARGE NOTE PROVIDER - CARE PROVIDER_API CALL
Myron Laughlin)  Hematology; Internal Medicine; Medical Oncology  [address], [address]  [place] [address]  Phone: [phone]  Fax: [phone]  Follow Up Time: 1 week    Ranulfo Dotson)  Critical Care Medicine; Internal Medicine; Pulmonary Disease  [address] [facility] [address] [facility], [address] [place] [address]  Phone: [phone]  Fax: [phone]  Follow Up Time: 1 week    [place] [address]  Phone: [phone]  Fax: [phone]  Follow Up Time: 1-3 days

## 2020-01-31 NOTE — DISCHARGE NOTE PROVIDER - HOSPITAL COURSE
74 year old male, with PMH of legally blind, dementia, CHF, DM, HTN, depression, CVA with residual left sided deficits and BPH, sent by PCP for drop in hg, patient had EGD done this past September and also a colonoscopy about 2 years ago which were both normal.     CT head was done and showed no acute pathology chronic ischemic changes are diffusely noted throughout both hemispheres as well as within the brainstem and cerebellum. Similar findings were noted previously. No acute abnormality is suggested. clear sinuses and mastoids.     h/h improved and remained stable, stool for occult blood was negative.     episodes of Lethargy during the day, Pulm Dr. Desai on board recommends out pt sleep study for evaluation of ERIC and possible need of nocturnal CPAP     neuro Dr Mosley followed, no acute interventions recommended    cardio Dr Grimes followed for severe AS and GD3 DD, no acute interventions recommended     evaluated by PT, recommends [facility], medically optimized for discharge to [facility] with out pt follow up 74 year old male, with PMH of legally blind, dementia, CHF, DM, HTN, depression, CVA with residual left sided hemiparesis,  and BPH, sent by PCP for drop in hg, patient had EGD done this past September and also a colonoscopy about 2 years ago which were both normal.     CT head was done and showed no acute pathology chronic ischemic changes are diffusely noted throughout both hemispheres as well as within the brainstem and cerebellum. Similar findings were noted previously. No acute abnormality is suggested. clear sinuses and mastoids.     h/h improved and remained stable, stool for occult blood was negative.     episodes of Lethargy during the day, Pulm Dr. Desai on board recommends out pt sleep study for evaluation of ERIC and possible need of nocturnal CPAP     neuro Dr Mosley followed, no acute interventions recommended    cardio Dr Grimes followed for severe AS and GD3 DD, no acute interventions recommended     evaluated by PT, recommends [facility], medically optimized for discharge to [facility] with out pt follow up

## 2020-01-31 NOTE — PROGRESS NOTE ADULT - PROBLEM SELECTOR PLAN 2
-sent by PCP for drop in hg,   -no acute sign or symptoms of bleeding   -cont  Venofer 200 mg QD x 5 days   -s/p 1 PRBC 1/29  -h/h 8.3/26.4 today   -stool occult negative   -Dr. Laughlin (Heme/Onc) following  -Mon CBC

## 2020-01-31 NOTE — DISCHARGE NOTE PROVIDER - NSDCFUSCHEDAPPT_GEN_ALL_CORE_FT
JOHNNIE TAMAYO ; 02/07/2020 ; [facility] [address] [address]  JOHNNIE TAMAYO ; 02/07/2020 ; [facility] [address] [address]  JOHNNIE TAMAYO ; 02/07/2020 ; [facility] [address] [address] [address] JOHNNIE TAMAYO ; 02/07/2020 ; [facility] [address] [address]  JOHNNIE TAMAYO ; 02/07/2020 ; [facility] [address] [address]  JOHNNIE TAMAYO ; 02/07/2020 ; [facility] [address]urg [address] [address] JOHNNIE TAMAYO ; 02/07/2020 ; [facility] [address] [address]  JOHNNIE TAMAYO ; 02/07/2020 ; [facility] [address] [address]  JOHNNIE TAMAYO ; 02/07/2020 ; [facility] [address] [address] [address]  JOHNNIE TAMAYO ; 03/06/2020 ; [facility] [id number]

## 2020-01-31 NOTE — DISCHARGE NOTE PROVIDER - CARE PROVIDERS DIRECT ADDRESSES
,DirectAddress_Unknown,[email or web address]

## 2020-01-31 NOTE — CONSULT NOTE ADULT - SUBJECTIVE AND OBJECTIVE BOX
CHIEF COMPLAINT:    HPI:HPI:  Patient is a 74 year old male, with PMH of legally blind, dementia, CHF, DM, HTN, depression, CVA with residual left sided deficits and BPH, who comes in from home with his wife after being told his hemoglobin dropped from 10.8 to 7.2 and drop in hematocrit from 34 to 23.6. Patient went to PCP on Friday and had labs done and was told about results and was advised to come to the ED. Wife present at bedside also stated that patient has been lethargic for the past 2-3 months. Of note, patient had EGD done this past September and also a colonoscopy about 2 years ago which were both normal. Patient denies any chest pain, headache, dizziness, nausea, vomiting, abdominal pain. No signs of bleeding noted as per wife. Patient also was recently discharged from subacute rehab last month.     PAST MEDICAL & SURGICAL HISTORY:  Legally blind  BPH (benign prostatic hyperplasia)  Depression  Hyperlipidemia  CVA (cerebral vascular accident)  Diabetes  No significant past surgical history      MEDICATIONS  (STANDING):  albuterol/ipratropium for Nebulization 3 milliLiter(s) Nebulizer every 6 hours  aspirin enteric coated 81 milliGRAM(s) Oral daily  atorvastatin 80 milliGRAM(s) Oral at bedtime  cholecalciferol 400 Unit(s) Oral daily  clopidogrel Tablet 75 milliGRAM(s) Oral daily  collagenase Ointment 1 Application(s) Topical daily  dextrose 5%. 1000 milliLiter(s) (50 mL/Hr) IV Continuous <Continuous>  donepezil 10 milliGRAM(s) Oral at bedtime  enalapril 10 milliGRAM(s) Oral daily  famotidine    Tablet 20 milliGRAM(s) Oral daily  finasteride 5 milliGRAM(s) Oral daily  gabapentin 300 milliGRAM(s) Oral two times a day  heparin  Injectable 5000 Unit(s) SubCutaneous every 8 hours  insulin glargine Injectable (LANTUS) 4 Unit(s) SubCutaneous at bedtime  insulin lispro (HumaLOG) corrective regimen sliding scale   SubCutaneous three times a day before meals  iron sucrose IVPB 200 milliGRAM(s) IV Intermittent every 24 hours  metoprolol tartrate 25 milliGRAM(s) Oral two times a day  tamsulosin 0.8 milliGRAM(s) Oral at bedtime    MEDICATIONS  (PRN):  glucagon  Injectable 1 milliGRAM(s) IntraMuscular once PRN Glucose LESS THAN 70 milligrams/deciliter      FAMILY HISTORY:  No pertinent family history in first degree relatives    No family history of premature coronary artery disease or sudden cardiac death    SOCIAL HISTORY:  Smoking-  Alcohol-  Ilicit Drug use-    REVIEW OF SYSTEMS:  Constitutional: [ ] fever, [ ]weight loss, [ ]fatigue Activity [ ] Bedbound,[ ] Ambulates [ ] Unassisted[ ] Cane/Walker [ ] Assistence.  Eyes: [ ] visual changes  Respiratory: [ ]shortness of breath;  [ ] cough, [ ]wheezing, [ ]chills, [ ]hemoptysis  Cardiovascular: [ ] chest pain, [ ]palpitations, [ ]dizziness,  [ ]leg swelling[ ]orthopnea [ ]PND  Gastrointestinal: [ ] abdominal pain, [ ]nausea, [ ]vomiting,  [ ]diarrhea,[ ]constipation  Genitourinary: [ ] dysuria, [ ] hematuria  Neurologic: [ ] headaches [ ] tremors[ ] weakness  Skin: [ ] itching, [ ]burning, [ ] rashes  Endocrine: [ ] heat or cold intolerance  Musculoskeletal: [ ] joint pain or swelling; [ ] muscle, back, or extremity pain  Psychiatric: [ ] depression, [ ]anxiety, [ ]mood swings, or [ ]difficulty sleeping  Hematologic: [ ] easy bruising, [ ] bleeding gums       [ x] All others negative	  [ ] Unable to obtain    Vital Signs Last 24 Hrs  T(C): 37.3 (31 Jan 2020 05:10), Max: 37.3 (31 Jan 2020 05:10)  T(F): 99.1 (31 Jan 2020 05:10), Max: 99.1 (31 Jan 2020 05:10)  HR: 67 (31 Jan 2020 05:10) (61 - 94)  BP: 128/77 (31 Jan 2020 05:10) (111/52 - 128/77)  BP(mean): --  RR: 18 (31 Jan 2020 05:10) (18 - 18)  SpO2: 97% (31 Jan 2020 05:10) (95% - 100%)  I&O's Summary    30 Jan 2020 07:01  -  31 Jan 2020 07:00  --------------------------------------------------------  IN: 0 mL / OUT: 400 mL / NET: -400 mL        PHYSICAL EXAM:  General: No acute distress BMI-  HEENT: EOMI, PERRL[ ] Icteric  Neck: Supple, No JVD  Lungs: Equal air entry bilaterally; [ ] Rales [ ] Rhonchi [ ] Wheezing  Heart: Regular rate and rhythm;[ ] Murmurs-   /6 [ ] Systolic [ ] Diastolic [ ] Radiation,No rubs, or gallops  Abdomen: Nontender, bowel sounds present  Extremities: No clubbing, cyanosis, or edema[ ] Calf tenderness  Nervous system:  Alert & Oriented X3, no focal deficits  Psychiatric: Normal affect  Skin: No rashes or lesions      LABS:  01-31    143  |  110<H>  |  22<H>  ----------------------------<  95  3.9   |  28  |  1.29    Ca    8.8      31 Jan 2020 07:18    TPro  6.1  /  Alb  2.8<L>  /  TBili  0.4  /  DBili  x   /  AST  12  /  ALT  24  /  AlkPhos  79  01-31    Creatinine Trend: 1.29<--, 1.30<--, 1.21<--, 1.20<--, 1.39<--                        8.3    5.97  )-----------( 212      ( 31 Jan 2020 07:18 )             26.4           01-28 [id number] 6.8        ECHO:Study Date: 1/29/2020  Normal Left Ventricular Systolic Function, (EF = 50-55%)  Moderate mitral regurgitation.  Estimated aortic valve area equals 1 sqcm (by continuity equation), consistent with severe aortic stenosis.

## 2020-01-31 NOTE — PROGRESS NOTE ADULT - SUBJECTIVE AND OBJECTIVE BOX
74 year old male, with PMH of legally blind, dementia, CHF, DM, HTN, depression, CVA with residual left sided deficits and BPH, sent by PCP for drop in hg, patient had EGD done this past September and also a colonoscopy about 2 years ago which were both normal.   admitted for anemia, received 1 PRBC on 1/29 with improvement in hg, followed by Heme, started on IV Venofer   episodes of Lethargy during the day, Pulm Dr. Desai on board, neuro Dr Mosley consulted   evaluated by PT, recommends HENOK, pending ins auth for HENOK placement     OVERNIGHT EVENTS: no acute events overnight     REVIEW OF SYSTEMS:  NECK: No pain or stiffness  RESPIRATORY: No cough, SOB  CARDIOVASCULAR: No chest pain, palpitations  GASTROINTESTINAL: No abdominal pain. No nausea, vomiting, or diarrhea  GENITOURINARY: No dysuria  NEUROLOGICAL: No HA  MUSCULOSKELETAL: No joint pain or swelling; No muscle, back, or extremity pain    T(C): 37.3 (01-31-20 @ 05:10), Max: 37.3 (01-31-20 @ 05:10)  HR: 67 (01-31-20 @ 05:10) (61 - 94)  BP: 128/77 (01-31-20 @ 05:10) (111/52 - 128/77)  RR: 18 (01-31-20 @ 05:10) (18 - 18)  SpO2: 97% (01-31-20 @ 05:10) (95% - 100%)  Wt(kg): --Vital Signs Last 24 Hrs  T(C): 37.3 (31 Jan 2020 05:10), Max: 37.3 (31 Jan 2020 05:10)  T(F): 99.1 (31 Jan 2020 05:10), Max: 99.1 (31 Jan 2020 05:10)  HR: 67 (31 Jan 2020 05:10) (61 - 94)  BP: 128/77 (31 Jan 2020 05:10) (111/52 - 128/77)  BP(mean): --  RR: 18 (31 Jan 2020 05:10) (18 - 18)  SpO2: 97% (31 Jan 2020 05:10) (95% - 100%)    MEDICATIONS  (STANDING):  albuterol/ipratropium for Nebulization 3 milliLiter(s) Nebulizer every 6 hours  aspirin enteric coated 81 milliGRAM(s) Oral daily  atorvastatin 80 milliGRAM(s) Oral at bedtime  cholecalciferol 400 Unit(s) Oral daily  clopidogrel Tablet 75 milliGRAM(s) Oral daily  collagenase Ointment 1 Application(s) Topical daily  dextrose 5%. 1000 milliLiter(s) (50 mL/Hr) IV Continuous <Continuous>  donepezil 10 milliGRAM(s) Oral at bedtime  enalapril 10 milliGRAM(s) Oral daily  famotidine    Tablet 20 milliGRAM(s) Oral daily  finasteride 5 milliGRAM(s) Oral daily  gabapentin 300 milliGRAM(s) Oral two times a day  heparin  Injectable 5000 Unit(s) SubCutaneous every 8 hours  insulin glargine Injectable (LANTUS) 4 Unit(s) SubCutaneous at bedtime  insulin lispro (HumaLOG) corrective regimen sliding scale   SubCutaneous three times a day before meals  iron sucrose IVPB 200 milliGRAM(s) IV Intermittent every 24 hours  metoprolol tartrate 25 milliGRAM(s) Oral two times a day  tamsulosin 0.8 milliGRAM(s) Oral at bedtime    MEDICATIONS  (PRN):  glucagon  Injectable 1 milliGRAM(s) IntraMuscular once PRN Glucose LESS THAN 70 milligrams/deciliter      PHYSICAL EXAM:  GENERAL: NAD, conversant   ENMT: Moist mucous membranes  NECK: Supple, No JVD   CHEST/LUNG: Clear to auscultation bilaterally; No rales, rhonchi, wheezing, or rubs  HEART: S1, S2, Regular rate and rhythm  ABDOMEN: Soft, Nontender, Nondistended; Bowel sounds present  NEURO: Alert & Oriented X2-3  EXTREMITIES: No LE edema, no calf tenderness    Consultant(s) Notes Reviewed:  [x ] YES  [ ] NO  Care Discussed with Consultants/Other Providers [ x] YES  [ ] NO    LABS:                        8.3    5.97  )-----------( 212      ( 31 Jan 2020 07:18 )             26.4     01-31    143  |  110<H>  |  22<H>  ----------------------------<  95  3.9   |  28  |  1.29    Ca    8.8      31 Jan 2020 07:18    TPro  6.1  /  Alb  2.8<L>  /  TBili  0.4  /  DBili  x   /  AST  12  /  ALT  24  /  AlkPhos  79  01-31        CAPILLARY BLOOD GLUCOSE      POCT Blood Glucose.: 93 mg/dL (31 Jan 2020 08:37)  POCT Blood Glucose.: 139 mg/dL (30 Jan 2020 21:17)  POCT Blood Glucose.: 124 mg/dL (30 Jan 2020 16:30)  POCT Blood Glucose.: 206 mg/dL (30 Jan 2020 11:26)    RADIOLOGY & ADDITIONAL TESTS:    < from: CT Head No Cont (01.27.20 @ 20:33) >    EXAM:  CT BRAIN                            PROCEDURE DATE:  01/27/2020          INTERPRETATION:  INDICATION:  Lethargy  TECHNIQUE:  A non contrast 2.5mm axial CT study of the brain was performed from skull base to vertex. Coronal and sagittal reformations were generated from the axial data.  COMPARISON EXAMINATION:  CT dated 4/17/2018    FINDINGS:      HEMISPHERES:  Chronic small vessel ischemic changes are again noted throughout both hemispheres with atrophy. No acute infarct or hemorrhage is suggested.  VENTRICLES:  Ex vacuo enlargement is noted  POSTERIOR FOSSA:  Chronic ischemic changes are noted in the brainstem and in both cerebellar hemispheres with atrophic change as well.  EXTRACEREBRAL SPACES:  No subdural or epidural collections are noted.  SKULL BASE AND CALVARIUM:  Appears intact.  No fracture or destructive lesion is identified.  SINUSES AND MASTOIDS:  Clear.  MISCELLANEOUS:  No orbital or suprasellar abnormality noted.      IMPRESSION:    1)  chronic ischemic changes are diffusely noted throughout both hemispheres as well as within the brainstem and cerebellum. Similar findings were noted previously. No acute abnormality is suggested.  2)  clear sinuses and mastoids..    < end of copied text >    < from: Transthoracic Echocardiogram (01.29.20 @ 07:34) >    Patient name: JOHNNIE TAMAYO  YOB: 1945   Age: 74 (M)   MR#: [id number]  Study Date: 1/29/2020  Location: [address] [place], [place] [address]Sonographer: Abhinav [facility]  Study quality: Technically good  Referring Physician: Ronald Tello MD  Blood Pressure: 108/58 mmHg  Height: 183 cm  Weight: 79 kg  BSA: 2 m2  ------------------------------------------------------------------------    PROCEDURE: Transthoracic echocardiogram with 2-D, M-Mode  and complete spectral and color flow Doppler.  INDICATION: Dyspnea, unspecified (R06.00)  HISTORY:  ------------------------------------------------------------------------  DIMENSIONS:  Dimensions:     Normal Values:  LA:     4.7 cm    2.0 - 4.0 cm  Ao:     4.1 cm    2.0 - 3.8 cm  SEPTUM: 1.4 cm    0.6- 1.2 cm  PWT:    1.2 cm    0.6 - 1.1 cm  LVIDd:  4.8 cm    3.0 - 5.6 cm  LVIDs:  3.6 cm    1.8 - 4.0 cm      Derived Variables:  LVMI: 122 g/m2  RWT: 0.50  Ejection Fraction Visual Estimate: 50-55 %  Peak Velocity (m/sec): AoV=2.4  ------------------------------------------------------------------------  OBSERVATIONS:  Mitral Valve: Normal mitral valve. Moderate mitral  regurgitation.  Aortic Root: Aortic Root: 4.1 cm.    Aortic Valve: Calcified trileaflet aortic valve with  decreased opening. Peak transaortic valve gradient equals  24 mm Hg, mean transaortic valve gradient equals 16 mm Hg,  estimated aortic valve area equals 1 sqcm (by continuity  equation), consistent with severe aortic stenosis.The  dimensionless index is .39.  Left Atrium: Severely dilated left atrium.  LA volume index  = 83 cc/m2.  Left Ventricle: Normal Left Ventricular Systolic Function,  (EF = 50-55%) Mild concentric left ventricular hypertrophy.  Grade III diastolic dysfunction.  Right Heart: Normal right atrium. Normal right ventricular  size and function. Normal tricuspid valve. There is mild  pulmonic regurgitation.  Pericardium/PleuraNormal pericardium with no pericardial  effusion.  Hemodynamic: Unable to estimate RVSP.  ------------------------------------------------------------------------  CONCLUSIONS:  1. Normal mitral valve. Moderate mitral regurgitation.  2. Calcified trileaflet aortic valve with decreased  opening. Peak transaortic valve gradient equals 24 mm Hg,  mean transaortic valve gradient equals 16 mm Hg, estimated  aortic valve area equals 1 sqcm (by continuity equation),  consistent with severe aortic stenosis.The dimensionless  index is .39.  3. Aortic Root: 4.1 cm.  4. Severely dilated left atrium.  LA volume index = 83  cc/m2.  5. Mild concentric left ventricular hypertrophy.  6. Normal Left Ventricular Systolic Function,  (EF =  50-55%)  7. Grade III diastolic dysfunction.  8. Normal right atrium.  9. Normal right ventricular size and function.  10. Unable to estimate RVSP.  11. Normal tricuspid valve.  12. There is mild pulmonic regurgitation.  13. Normal pericardium with no pericardial effusion.      < end of copied text >      Imaging Personally Reviewed:  [ ] YES  [ ] NO 74 year old male, with PMH of legally blind, dementia, CHF, DM, HTN, depression, CVA with residual left sided deficits and BPH, sent by PCP for drop in hg, patient had EGD done this past September and also a colonoscopy about 2 years ago which were both normal.   admitted for anemia, received 1 PRBC on 1/29 with improvement in hg, followed by Heme, started on IV Venofer   episodes of Lethargy during the day, Pulm Dr. Desai on board recommends out pt sleep study  neuro Dr Mosley following.   evaluated by PT, recommends HENOK, pending ins auth for HENOK placement     OVERNIGHT EVENTS: no acute events overnight     REVIEW OF SYSTEMS:  NECK: No pain or stiffness  RESPIRATORY: No cough, SOB  CARDIOVASCULAR: No chest pain, palpitations  GASTROINTESTINAL: No abdominal pain. No nausea, vomiting, or diarrhea  GENITOURINARY: No dysuria  NEUROLOGICAL: No HA  MUSCULOSKELETAL: No joint pain or swelling; No muscle, back, or extremity pain    T(C): 37.3 (01-31-20 @ 05:10), Max: 37.3 (01-31-20 @ 05:10)  HR: 67 (01-31-20 @ 05:10) (61 - 94)  BP: 128/77 (01-31-20 @ 05:10) (111/52 - 128/77)  RR: 18 (01-31-20 @ 05:10) (18 - 18)  SpO2: 97% (01-31-20 @ 05:10) (95% - 100%)  Wt(kg): --Vital Signs Last 24 Hrs  T(C): 37.3 (31 Jan 2020 05:10), Max: 37.3 (31 Jan 2020 05:10)  T(F): 99.1 (31 Jan 2020 05:10), Max: 99.1 (31 Jan 2020 05:10)  HR: 67 (31 Jan 2020 05:10) (61 - 94)  BP: 128/77 (31 Jan 2020 05:10) (111/52 - 128/77)  BP(mean): --  RR: 18 (31 Jan 2020 05:10) (18 - 18)  SpO2: 97% (31 Jan 2020 05:10) (95% - 100%)    MEDICATIONS  (STANDING):  albuterol/ipratropium for Nebulization 3 milliLiter(s) Nebulizer every 6 hours  aspirin enteric coated 81 milliGRAM(s) Oral daily  atorvastatin 80 milliGRAM(s) Oral at bedtime  cholecalciferol 400 Unit(s) Oral daily  clopidogrel Tablet 75 milliGRAM(s) Oral daily  collagenase Ointment 1 Application(s) Topical daily  dextrose 5%. 1000 milliLiter(s) (50 mL/Hr) IV Continuous <Continuous>  donepezil 10 milliGRAM(s) Oral at bedtime  enalapril 10 milliGRAM(s) Oral daily  famotidine    Tablet 20 milliGRAM(s) Oral daily  finasteride 5 milliGRAM(s) Oral daily  gabapentin 300 milliGRAM(s) Oral two times a day  heparin  Injectable 5000 Unit(s) SubCutaneous every 8 hours  insulin glargine Injectable (LANTUS) 4 Unit(s) SubCutaneous at bedtime  insulin lispro (HumaLOG) corrective regimen sliding scale   SubCutaneous three times a day before meals  iron sucrose IVPB 200 milliGRAM(s) IV Intermittent every 24 hours  metoprolol tartrate 25 milliGRAM(s) Oral two times a day  tamsulosin 0.8 milliGRAM(s) Oral at bedtime    MEDICATIONS  (PRN):  glucagon  Injectable 1 milliGRAM(s) IntraMuscular once PRN Glucose LESS THAN 70 milligrams/deciliter      PHYSICAL EXAM:  GENERAL: NAD, conversant   ENMT: Moist mucous membranes  NECK: Supple, No JVD   CHEST/LUNG: Clear to auscultation bilaterally; No rales, rhonchi, wheezing, or rubs  HEART: S1, S2, Regular rate and rhythm  ABDOMEN: Soft, Nontender, Nondistended; Bowel sounds present  NEURO: Alert & Oriented X2-3  EXTREMITIES: No LE edema, no calf tenderness    Consultant(s) Notes Reviewed:  [x ] YES  [ ] NO  Care Discussed with Consultants/Other Providers [ x] YES  [ ] NO    LABS:                        8.3    5.97  )-----------( 212      ( 31 Jan 2020 07:18 )             26.4     01-31    143  |  110<H>  |  22<H>  ----------------------------<  95  3.9   |  28  |  1.29    Ca    8.8      31 Jan 2020 07:18    TPro  6.1  /  Alb  2.8<L>  /  TBili  0.4  /  DBili  x   /  AST  12  /  ALT  24  /  AlkPhos  79  01-31        CAPILLARY BLOOD GLUCOSE      POCT Blood Glucose.: 93 mg/dL (31 Jan 2020 08:37)  POCT Blood Glucose.: 139 mg/dL (30 Jan 2020 21:17)  POCT Blood Glucose.: 124 mg/dL (30 Jan 2020 16:30)  POCT Blood Glucose.: 206 mg/dL (30 Jan 2020 11:26)    RADIOLOGY & ADDITIONAL TESTS:    < from: CT Head No Cont (01.27.20 @ 20:33) >    EXAM:  CT BRAIN                            PROCEDURE DATE:  01/27/2020          INTERPRETATION:  INDICATION:  Lethargy  TECHNIQUE:  A non contrast 2.5mm axial CT study of the brain was performed from skull base to vertex. Coronal and sagittal reformations were generated from the axial data.  COMPARISON EXAMINATION:  CT dated 4/17/2018    FINDINGS:      HEMISPHERES:  Chronic small vessel ischemic changes are again noted throughout both hemispheres with atrophy. No acute infarct or hemorrhage is suggested.  VENTRICLES:  Ex vacuo enlargement is noted  POSTERIOR FOSSA:  Chronic ischemic changes are noted in the brainstem and in both cerebellar hemispheres with atrophic change as well.  EXTRACEREBRAL SPACES:  No subdural or epidural collections are noted.  SKULL BASE AND CALVARIUM:  Appears intact.  No fracture or destructive lesion is identified.  SINUSES AND MASTOIDS:  Clear.  MISCELLANEOUS:  No orbital or suprasellar abnormality noted.      IMPRESSION:    1)  chronic ischemic changes are diffusely noted throughout both hemispheres as well as within the brainstem and cerebellum. Similar findings were noted previously. No acute abnormality is suggested.  2)  clear sinuses and mastoids..    < end of copied text >    < from: Transthoracic Echocardiogram (01.29.20 @ 07:34) >    Patient name: JOHNNIE TAMAYO  YOB: 1945   Age: 74 (M)   MR#: [id number]  Study Date: 1/29/2020  Location: [address] [place], [place] [address]Sonographer: Abhinav Belle [facility]  Study quality: Technically good  Referring Physician: Ronald Tello MD  Blood Pressure: 108/58 mmHg  Height: 183 cm  Weight: 79 kg  BSA: 2 m2  ------------------------------------------------------------------------    PROCEDURE: Transthoracic echocardiogram with 2-D, M-Mode  and complete spectral and color flow Doppler.  INDICATION: Dyspnea, unspecified (R06.00)  HISTORY:  ------------------------------------------------------------------------  DIMENSIONS:  Dimensions:     Normal Values:  LA:     4.7 cm    2.0 - 4.0 cm  Ao:     4.1 cm    2.0 - 3.8 cm  SEPTUM: 1.4 cm    0.6- 1.2 cm  PWT:    1.2 cm    0.6 - 1.1 cm  LVIDd:  4.8 cm    3.0 - 5.6 cm  LVIDs:  3.6 cm    1.8 - 4.0 cm      Derived Variables:  LVMI: 122 g/m2  RWT: 0.50  Ejection Fraction Visual Estimate: 50-55 %  Peak Velocity (m/sec): AoV=2.4  ------------------------------------------------------------------------  OBSERVATIONS:  Mitral Valve: Normal mitral valve. Moderate mitral  regurgitation.  Aortic Root: Aortic Root: 4.1 cm.    Aortic Valve: Calcified trileaflet aortic valve with  decreased opening. Peak transaortic valve gradient equals  24 mm Hg, mean transaortic valve gradient equals 16 mm Hg,  estimated aortic valve area equals 1 sqcm (by continuity  equation), consistent with severe aortic stenosis.The  dimensionless index is .39.  Left Atrium: Severely dilated left atrium.  LA volume index  = 83 cc/m2.  Left Ventricle: Normal Left Ventricular Systolic Function,  (EF = 50-55%) Mild concentric left ventricular hypertrophy.  Grade III diastolic dysfunction.  Right Heart: Normal right atrium. Normal right ventricular  size and function. Normal tricuspid valve. There is mild  pulmonic regurgitation.  Pericardium/PleuraNormal pericardium with no pericardial  effusion.  Hemodynamic: Unable to estimate RVSP.  ------------------------------------------------------------------------  CONCLUSIONS:  1. Normal mitral valve. Moderate mitral regurgitation.  2. Calcified trileaflet aortic valve with decreased  opening. Peak transaortic valve gradient equals 24 mm Hg,  mean transaortic valve gradient equals 16 mm Hg, estimated  aortic valve area equals 1 sqcm (by continuity equation),  consistent with severe aortic stenosis.The dimensionless  index is .39.  3. Aortic Root: 4.1 cm.  4. Severely dilated left atrium.  LA volume index = 83  cc/m2.  5. Mild concentric left ventricular hypertrophy.  6. Normal Left Ventricular Systolic Function,  (EF =  50-55%)  7. Grade III diastolic dysfunction.  8. Normal right atrium.  9. Normal right ventricular size and function.  10. Unable to estimate RVSP.  11. Normal tricuspid valve.  12. There is mild pulmonic regurgitation.  13. Normal pericardium with no pericardial effusion.      < end of copied text >      Imaging Personally Reviewed:  [ ] YES  [ ] NO

## 2020-01-31 NOTE — DISCHARGE NOTE PROVIDER - PROVIDER TOKENS
PROVIDER:[TOKEN:[[id number]],FOLLOWUP:[1 week]],PROVIDER:[TOKEN:[[id number]],FOLLOWUP:[1 week]],PROVIDER:[TOKEN:[[id number]],FOLLOWUP:[1-3 days]]

## 2020-01-31 NOTE — DISCHARGE NOTE PROVIDER - NSDCCPCAREPLAN_GEN_ALL_CORE_FT
PRINCIPAL DISCHARGE DIAGNOSIS  Diagnosis: Altered mental status, unspecified altered mental status type  Assessment and Plan of Treatment: you were send by your PMD for low blood count and generalized weakness, your symptoms were likely due to anemia vs due to medications vs due to dementia   CT scan of head was done and it was negative   you received 1 unit of blood and IV Iron in the hospital   your blood count was low but remained stable   follow up with PCP within 3-5 days of discharge to repeat blood work      SECONDARY DISCHARGE DIAGNOSES  Diagnosis: CAD (coronary artery disease)  Assessment and Plan of Treatment: CAD (coronary artery disease)    Diagnosis: Advance care planning  Assessment and Plan of Treatment: pt is DNR/DNI    Diagnosis: Congestive heart failure, unspecified HF chronicity, unspecified heart failure type  Assessment and Plan of Treatment: continue current medications   Weigh yourself daily.  If you gain 3lbs in 3 days, or 5lbs in a week call your Health Care Provider.  Do not eat or drink foods containing more than 2000mg of salt (sodium) in your diet every day.  Call your Health Care Provider if you have any swelling or increased swelling in your feet, ankles, and/or stomach.  Take all of your medication as directed.  If you become dizzy call your Health Care Provider. PRINCIPAL DISCHARGE DIAGNOSIS  Diagnosis: Altered mental status, unspecified altered mental status type  Assessment and Plan of Treatment: you were send by your PMD for low blood count and generalized weakness, your symptoms were likely due to anemia vs due to medications vs due to dementia   CT scan of head was done and it was negative   you received 1 unit of blood and IV Iron in the hospital   your blood count was low but remained stable   follow up with PCP within 3-5 days of discharge to repeat blood work      SECONDARY DISCHARGE DIAGNOSES  Diagnosis: CAD (coronary artery disease)  Assessment and Plan of Treatment: continue current medications    Diagnosis: Advance care planning  Assessment and Plan of Treatment: pt is DNR/DNI    Diagnosis: Congestive heart failure, unspecified HF chronicity, unspecified heart failure type  Assessment and Plan of Treatment: continue current medications   Weigh yourself daily.  If you gain 3lbs in 3 days, or 5lbs in a week call your Health Care Provider.  Do not eat or drink foods containing more than 2000mg of salt (sodium) in your diet every day.  Call your Health Care Provider if you have any swelling or increased swelling in your feet, ankles, and/or stomach.  Take all of your medication as directed.  If you become dizzy call your Health Care Provider.      Diagnosis: BPH (benign prostatic hyperplasia)  Assessment and Plan of Treatment: continue current medications    Diagnosis: Diabetes  Assessment and Plan of Treatment: HgA1C this admission 6.8  Make sure you get your HgA1c checked every three months.  If you take oral diabetes medications, check your blood glucose two times a day.  If you take insulin, check your blood glucose before meals and at bedtime.  It's important not to skip any meals.  Keep a log of your blood glucose results and always take it with you to your doctor appointments.  Keep a list of your current medications including injectables and over the counter medications and bring this medication list with you to all your doctor appointments.  If you have not seen your ophthalmologist this year call for appointment.  Check your feet daily for redness, sores, or openings. Do not self treat. If no improvement in two days call your primary care physician for an appointment. PRINCIPAL DISCHARGE DIAGNOSIS  Diagnosis: Encephalopathy  Assessment and Plan of Treatment: your encephalopathy multifactorial,  likely due to anemia vs due to medications such as neurontin vs due to dementia or ERIC   CT scan of head was done and it was negative   you received 1 unit of blood and IV Iron in the hospital   your blood count was low but remained stable   follow up with PCP within 3-5 days of discharge to repeat blood work      SECONDARY DISCHARGE DIAGNOSES  Diagnosis: CAD (coronary artery disease)  Assessment and Plan of Treatment: continue current medications    Diagnosis: Advance care planning  Assessment and Plan of Treatment: pt is DNR/DNI    Diagnosis: Congestive heart failure, unspecified HF chronicity, unspecified heart failure type  Assessment and Plan of Treatment: chronic HFpEF   continue current medications   Weigh yourself daily.  If you gain 3lbs in 3 days, or 5lbs in a week call your Health Care Provider.  Do not eat or drink foods containing more than 2000mg of salt (sodium) in your diet every day.  Call your Health Care Provider if you have any swelling or increased swelling in your feet, ankles, and/or stomach.  Take all of your medication as directed.  If you become dizzy call your Health Care Provider.      Diagnosis: BPH (benign prostatic hyperplasia)  Assessment and Plan of Treatment: continue current medications    Diagnosis: Diabetes  Assessment and Plan of Treatment: HgA1C this admission 6.8  Make sure you get your HgA1c checked every three months.  If you take oral diabetes medications, check your blood glucose two times a day.  If you take insulin, check your blood glucose before meals and at bedtime.  It's important not to skip any meals.  Keep a log of your blood glucose results and always take it with you to your doctor appointments.  Keep a list of your current medications including injectables and over the counter medications and bring this medication list with you to all your doctor appointments.  If you have not seen your ophthalmologist this year call for appointment.  Check your feet daily for redness, sores, or openings. Do not self treat. If no improvement in two days call your primary care physician for an appointment.    Diagnosis: Encephalopathy  Assessment and Plan of Treatment: you were send by your PMD for low blood count and generalized weakness, your symptoms were likely due to anemia vs due to medications vs due to dementia   CT scan of head was done and it was negative   you received 1 unit of blood and IV Iron in the hospital   your blood count was low but remained stable   follow up with PCP within 3-5 days of discharge to repeat blood work

## 2020-01-31 NOTE — PROGRESS NOTE ADULT - SUBJECTIVE AND OBJECTIVE BOX
Interval Events: No complaints, arousable but sleepy    OBJECTIVE:  Vital Signs Last 24 Hrs  T(C): 37.3 (31 Jan 2020 05:10), Max: 37.3 (31 Jan 2020 05:10)  T(F): 99.1 (31 Jan 2020 05:10), Max: 99.1 (31 Jan 2020 05:10)  HR: 64 (31 Jan 2020 09:39) (61 - 94)  BP: 128/77 (31 Jan 2020 05:10) (111/52 - 128/77)  RR: 18 (31 Jan 2020 05:10) (18 - 18)  SpO2: 97% (31 Jan 2020 09:39) (95% - 100%)    01-30 @ 07:01  -  01-31 @ 07:00  --------------------------------------------------------  IN: 0 mL / OUT: 400 mL / NET: -400 mL    CAPILLARY BLOOD GLUCOSE    POCT Blood Glucose.: 93 mg/dL (31 Jan 2020 08:37)    PHYSICAL EXAM:  General: Lethargic, arousable, follows commands  HEENT: Atraumatic, PERRL, Anicteric.   Lymph Nodes: No palpable lymphadenopathy  Respiratory: Basilar crack;es  Cardiovascular: S1 S2 normal. 2/6 SM  Abdomen: Soft, non-tender, non-distended. No organomegaly.  Extremities: Warm to touch. Peripheral pulse palpable. No pedal edema.   Skin: No rashes or skin lesions  Neurological: No focal deficits.  Psychiatry: Appropriate mood and affect.    HOSPITAL MEDICATIONS:  MEDICATIONS  (STANDING):  albuterol/ipratropium for Nebulization 3 milliLiter(s) Nebulizer every 6 hours  aspirin enteric coated 81 milliGRAM(s) Oral daily  atorvastatin 80 milliGRAM(s) Oral at bedtime  cholecalciferol 400 Unit(s) Oral daily  clopidogrel Tablet 75 milliGRAM(s) Oral daily  collagenase Ointment 1 Application(s) Topical daily  dextrose 5%. 1000 milliLiter(s) (50 mL/Hr) IV Continuous <Continuous>  donepezil 10 milliGRAM(s) Oral at bedtime  enalapril 10 milliGRAM(s) Oral daily  famotidine    Tablet 20 milliGRAM(s) Oral daily  finasteride 5 milliGRAM(s) Oral daily  gabapentin 300 milliGRAM(s) Oral two times a day  heparin  Injectable 5000 Unit(s) SubCutaneous every 8 hours  insulin glargine Injectable (LANTUS) 4 Unit(s) SubCutaneous at bedtime  insulin lispro (HumaLOG) corrective regimen sliding scale   SubCutaneous three times a day before meals  iron sucrose IVPB 200 milliGRAM(s) IV Intermittent every 24 hours  metoprolol tartrate 25 milliGRAM(s) Oral two times a day  tamsulosin 0.8 milliGRAM(s) Oral at bedtime    MEDICATIONS  (PRN):  glucagon  Injectable 1 milliGRAM(s) IntraMuscular once PRN Glucose LESS THAN 70 milligrams/deciliter    LABS:                        8.3    5.97  )-----------( 212      ( 31 Jan 2020 07:18 )             26.4     01-31    143  |  110<H>  |  22<H>  ----------------------------<  95  3.9   |  28  |  1.29    Ca    8.8      31 Jan 2020 07:18    TPro  6.1  /  Alb  2.8<L>  /  TBili  0.4  /  DBili  x   /  AST  12  /  ALT  24  /  AlkPhos  79  01-31    MICROBIOLOGY:     RADIOLOGY:  [X] Reviewed and interpreted by me

## 2020-01-31 NOTE — PROGRESS NOTE ADULT - ASSESSMENT
Anemia likely multifactorial with chronic disease  Severe Aortic stenosis and moderate MR  Lethargy/Debility  Prior CVA    Clinically stable  Agressive PT/OT as tolerated  Trial of discontinuing neurontin  Neuro note appreciated  Hospital does not have Autotitrating CPAP nor will insurance cover a home machine without a sleep study or evidence of acute on chronic hypercapnea respiratory failure/Cor Pulmonale, spiromtry.  Can get baseline ABG to eval for underlying hypercapnea but unlikely to alter management  ERIC is a chronic condition and [facility] any noninvasive ventilation use. Anemia likely multifactorial with chronic disease  Severe Aortic stenosis and moderate MR  Lethargy/Debility  Prior CVA    Clinically stable  Agressive PT/OT as tolerated  Trial of discontinuing neurontin  Neuro note appreciated  Hospital does not have Autotitrating CPAP nor will insurance cover a home machine without a sleep study or evidence of acute on chronic hypercapnea respiratory failure/Cor Pulmonale, spiromtry.  Can get baseline ABG to eval for underlying hypercapnea but unlikely to alter management  ERIC is a chronic condition and he has been in [facility] twice recently without any noninvasive ventilation use.  Outpatient follow up with Dr Ranulfo Dotson [facility]- [phone]

## 2020-01-31 NOTE — PROGRESS NOTE ADULT - ATTENDING COMMENTS
Discussed findings with patient's wife and with ALMA Ram.   Will give transfusion, one unit PRBC today, and iron sucrose.     If feeling better, will discharge tomorrow..
Patient is much more alert today.     Breathing pattern was discussed with Neurology, Pulmonary and Cardiology.  ERIC, pulmonary vs central.    Decreased flow would be contributing, if the sleep apnea has a central cause.  Discussed possibility of TaVR with Cardiology--not indicated because the gradient is not high.    All recommend sleep study and possible bipap.     Plan:  Discharge to [facility].              Patient will f/u with Pulmonologist after discharge.  He already has a referral to see Dr. Dotson.  Discussed above with patient's wife, who will follow through.
Patient was examined and discussed with ALMA Mabry yesterday.  Wife was at the bedside.       He was referred because of anemia.  No c/o melena or hematochezia.  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     Recent EGD and colonoscopy showed no bleeding.     Alert, chronically-ill blind man,  c/o hunger  Lungs, no rales  Cor, RRR  Abdomen, soft  stool was occult blood negative.    IMP:  Anemia without evidence of acute bleeding.  r/o hemolysis.   CHF, DM, stable  Plan: Retic, haptoglobin, LDH add on.           Hematology consultation, Dr. Laughlin will see.          SPEP/UPEP/immunofixation
Patient was examined and discussed with ALMA Solano.  Patient's wife is present.     < from: Transthoracic Echocardiogram (01.29.20 @ 07:34) >    1. Normal mitral valve. Moderate mitral regurgitation.  2. Calcified trileaflet aortic valve with decreased  opening. Peak transaortic valve gradient equals 24 mm Hg,  mean transaortic valve gradient equals 16 mm Hg, estimated  aortic valve area equals 1 sqcm (by continuity equation),  consistent with severe aortic stenosis.The dimensionless  index is .39.  3. Aortic Root: 4.1 cm.  4. Severely dilated left atrium.  LA volume index = 83  cc/m2.  5. Mild concentric left ventricular hypertrophy.  6. Normal Left Ventricular Systolic Function,  (EF =  50-55%)  7. Grade III diastolic dysfunction.  8. Normal right atrium.  9. Normal right ventricular size and function.  10. Unable to estimate RVSP.  11. Normal tricuspid valve.  12. There is mild pulmonic regurgitation.  13. Normal pericardium with no pericardial effusion.    < end of copied text >    IMP:  Encephalopathy, still somnolent, but rousable.           ASCVD          AS may be contributing to the encephalopathy, vs ERIC or neurontin  Pulmonary consultation, appreciated  Plan: Neurology consultation, Dr. Mosley          Cardiology consultation, Dr. Grmies          If able to participate, will ask PT to see to consider HENOK.

## 2020-01-31 NOTE — PROGRESS NOTE ADULT - PROBLEM SELECTOR PLAN 3
-likely due to poor PO intake  -Scr normalized   -Avoid Nephrotoxic Meds/ Agents like (NSAIDs, IV contrast, Aminoglycosides such as gentamicin, -Gadolinium contrast, Phosphate containing enemas, etc.)  -Monitor BMP

## 2020-01-31 NOTE — DISCHARGE NOTE PROVIDER - NSDCMRMEDTOKEN_GEN_ALL_CORE_FT
Aspir 81 oral delayed release tablet: 1 tab(s) orally once a day  cholecalciferol oral tablet: 400 unit(s) orally once a day  collagenase 250 units/g topical ointment: 1 application topically once a day  donepezil 10 mg oral tablet: 1 tab(s) orally once a day (at bedtime)  enalapril 10 mg oral tablet: 1 tab(s) orally once a day  famotidine 20 mg oral tablet: 1 tab(s) orally once a day (at bedtime)  finasteride 5 mg oral tablet: 1 tab(s) orally once a day  gabapentin 300 mg oral tablet:  orally 2 times a day  levalbuterol 0.63 mg/3 mL inhalation solution: 3 milliliter(s) inhaled 3 times a day  Levemir 100 units/mL subcutaneous solution: 24 unit(s) subcutaneous 2 times a day  metFORMIN 1000 mg oral tablet: 1 tab(s) orally 2 times a day  Metoprolol Tartrate 25 mg oral tablet: 1 tab(s) orally 2 times a day  Namenda:   Plavix 75 mg oral tablet: 1 tab(s) orally once a day  rosuvastatin 40 mg oral tablet: 1 tab(s) orally once a day (at bedtime)  tamsulosin 0.4 mg oral capsule: 1 cap(s) orally once a day

## 2020-01-31 NOTE — PROGRESS NOTE ADULT - NSHPATTENDINGPLANDISCUSS_GEN_ALL_CORE
Patient, wife, ALMA Ram, Hematology
Patient, wife, case management
Patient, wife, ALMA Mabry
ALMA Solano, wife, RN

## 2020-01-31 NOTE — PROGRESS NOTE ADULT - PROBLEM SELECTOR PLAN 1
-family reports worsening mental status over past 2-3 months, worsening dementia vs polypharmacy vs ERIC   -CT head negative   -UA and culture negative   -afebrile, no leukocytosis   -waxing and waning mental status   -Neuro Dr. Mosley consulted  -Pulm Dr. Desai

## 2020-02-07 ENCOUNTER — APPOINTMENT (OUTPATIENT)
Dept: VASCULAR SURGERY | Facility: CLINIC | Age: 75
End: 2020-02-07
Payer: MEDICARE

## 2020-02-07 PROCEDURE — 93923 UPR/LXTR ART STDY 3+ LVLS: CPT

## 2020-02-07 PROCEDURE — 99213 OFFICE O/P EST LOW 20 MIN: CPT

## 2020-02-07 PROCEDURE — 93880 EXTRACRANIAL BILAT STUDY: CPT

## 2020-02-07 NOTE — PHYSICAL EXAM
[JVD] : no jugular venous distention  [Normal Heart Sounds] : normal heart sounds [Normal Breath Sounds] : Normal breath sounds [Right Carotid Bruit] : right carotid bruit heard [2+] : left 2+ [0] : left 0 [Ankle Swelling (On Exam)] : not present [Varicose Veins Of Lower Extremities] : not present [] : not present [Abdomen Masses] : No abdominal masses [Skin Ulcer] : ulcer [de-identified] : The right second toe with discoloration with superficial ulcerations at the PIP joint

## 2020-02-07 NOTE — HISTORY OF PRESENT ILLNESS
[FreeTextEntry1] : Patient is a 73-year-old male with past medical history significant for diabetes, hypertension, history of CVA x3 who is wheelchair-bound with minimal ambulation presenting to us for evaluation of right second and third toe discoloration with superficial ulceration. No complaint and history of fevers or chills. No significant rest pain all claudication symptoms even though the patient suffers from neuropathy. Patient is a former smoker.No recent CVA or TIA

## 2020-02-07 NOTE — ASSESSMENT
[FreeTextEntry1] : Patient with mild to moderate peripheral vascular disease with healing wound of the right second toe. Continue local wound care. \par Wound healing nicely \par Mild to moderate asymptomatic carotid disease.No recent CVA or TIA

## 2020-03-06 ENCOUNTER — APPOINTMENT (OUTPATIENT)
Dept: PULMONOLOGY | Facility: CLINIC | Age: 75
End: 2020-03-06
Payer: MEDICARE

## 2020-03-06 VITALS
TEMPERATURE: 98.7 F | OXYGEN SATURATION: 98 % | DIASTOLIC BLOOD PRESSURE: 58 MMHG | SYSTOLIC BLOOD PRESSURE: 108 MMHG | HEART RATE: 58 BPM

## 2020-03-06 DIAGNOSIS — R06.02 SHORTNESS OF BREATH: ICD-10-CM

## 2020-03-06 PROCEDURE — 36415 COLL VENOUS BLD VENIPUNCTURE: CPT

## 2020-03-06 PROCEDURE — 99215 OFFICE O/P EST HI 40 MIN: CPT | Mod: 25

## 2020-03-06 RX ORDER — ALBUTEROL SULFATE 0.63 MG/3ML
0.63 SOLUTION RESPIRATORY (INHALATION)
Qty: 4 | Refills: 5 | Status: ACTIVE | COMMUNITY
Start: 2018-07-27 | End: 1900-01-01

## 2020-03-06 RX ORDER — SITAGLIPTIN 100 MG/1
100 TABLET, FILM COATED ORAL
Refills: 0 | Status: DISCONTINUED | COMMUNITY
End: 2020-03-06

## 2020-03-06 RX ORDER — DONEPEZIL HYDROCHLORIDE 10 MG/1
10 TABLET, FILM COATED ORAL
Refills: 0 | Status: DISCONTINUED | COMMUNITY
End: 2020-03-06

## 2020-03-06 RX ORDER — POLYETHYLENE GLYCOL 3350 17 G/17G
17 POWDER, FOR SOLUTION ORAL DAILY
Qty: 510 | Refills: 5 | Status: DISCONTINUED | COMMUNITY
Start: 2017-11-28 | End: 2020-03-06

## 2020-03-06 RX ORDER — MEMANTINE HYDROCHLORIDE 10 MG/1
10 TABLET, FILM COATED ORAL
Refills: 0 | Status: DISCONTINUED | COMMUNITY
End: 2020-03-06

## 2020-03-06 RX ORDER — AMLODIPINE BESYLATE 10 MG/1
10 TABLET ORAL
Refills: 0 | Status: DISCONTINUED | COMMUNITY
End: 2020-03-06

## 2020-03-06 RX ORDER — METOCLOPRAMIDE HYDROCHLORIDE 5 MG/1
5 TABLET ORAL
Refills: 0 | Status: DISCONTINUED | COMMUNITY
End: 2020-03-06

## 2020-03-06 RX ORDER — METFORMIN HYDROCHLORIDE 1000 MG/1
1000 TABLET, COATED ORAL
Refills: 0 | Status: DISCONTINUED | COMMUNITY
End: 2020-03-06

## 2020-03-06 RX ORDER — PANTOPRAZOLE 40 MG/1
40 TABLET, DELAYED RELEASE ORAL
Refills: 0 | Status: DISCONTINUED | COMMUNITY
End: 2020-03-06

## 2020-03-06 NOTE — REVIEW OF SYSTEMS
Breath sounds clear and equal bilaterally. [Cough] : cough [Dyspnea] : dyspnea [PND] : PND [Dysphagia] : dysphagia [Diabetes] : diabetes mellitus [Hypertension] : ~T hypertension [Fever] : no fever [Chills] : no chills [Fatigue] : no fatigue [Sputum] : not coughing up ~M sputum [Wheezing] : no wheezing [Chest Discomfort] : no chest discomfort [Palpitations] : no palpitations [Nasal Discharge] : no nasal discharge [Heartburn] : no heartburn [Rash] : no [unfilled] rash [Anemia] : no anemia [Headache] : no headache [Thyroid Problem] : no thyroid problem [DVT] : no DVT [Difficulty Initiating Sleep] : no difficulty falling asleep [Snoring] : no snoring

## 2020-03-06 NOTE — DISCUSSION/SUMMARY
[FreeTextEntry1] : He is a 74 year-old man with a history of diabetes, hypertension, hyperlipidemia and stroke.\par \par He has a history of dysphagia as a consequence of a stroke. This is probably accounting for his cough. \par \par He does not have any evidence of COPD.\par \par He can continue with nebulizer treatments. \par \par Follow up CXR requested. \par \par I will see him again in 6 -12 months. Sooner if needed. \par

## 2020-03-06 NOTE — PROCEDURE
[FreeTextEntry1] : PFT 12/17/18: Spirometry was normal. There was moderate restriction. Diffusion was mildly reduced. No improvement noted after inhalation of bronchodilator.\par \par PFT 3/6/20: No obstruction. No change after bronchodilator. \par \par CXR 1/27/20: Pulmonary vascular congestion noted.\par \par A CT PA 4/17/18: Negative for pulmonary emboli.Cardiomegaly. No pleural effusion.

## 2020-03-06 NOTE — HISTORY OF PRESENT ILLNESS
[FreeTextEntry1] : He was hospitalized twice recently. In October he was [facility]. It was two days after the flu shot. In January he was in [facility]. He had CHF. \par \par Now has a cough at times. Also has a rhinitis. \par \par His wife noted difficulty in swallowing or other clear liquids.

## 2020-03-06 NOTE — PHYSICAL EXAM
[Heart Sounds] : normal S1 and S2 [Auscultation Breath Sounds / Voice Sounds] : lungs were clear to auscultation bilaterally [Nail Clubbing] : no clubbing of the fingernails [Cyanosis, Localized] : no localized cyanosis [No Focal Deficits] : no focal deficits [Oriented To Time, Place, And Person] : oriented to person, place, and time [General Appearance - In No Acute Distress] : no acute distress [Neck Cervical Mass (___cm)] : no neck mass was observed [] : no respiratory distress [Abdomen Tenderness] : non-tender [Erythema] : no erythema of the pharynx [FreeTextEntry1] : He is in a wheelchair.

## 2021-01-08 NOTE — ED ADULT NURSE NOTE - PAIN: PRESENCE, MLM
Patient notified of Covid PCR test result.  Patient denied any fevers.  Patient verbalized understanding of information given.   denies pain/discomfort

## 2021-02-02 NOTE — PROGRESS NOTE ADULT - PROBLEM SELECTOR PLAN 5
CXR results as above  IVF discontinued  No dyspnea  afebrile , no leukocytosis  Oxygenating  % on O2 NC  Will give IV lasix 20mg IV x 1. Hold  if SBP > 100  f/u echo   Duonebs prn CXR results as above  IVF discontinued  No dyspnea  afebrile , no leukocytosis  Oxygenating  % on O2 NC  Will give IV lasix 20mg IV x 1. Hold  if SBP< 100  f/u echo   Duonebs prn Burow's Advancement Flap Text: We discussed various closure modalities with the patient, including healing by second intention, primary closure, skin graft and various flaps.  The location and configuration of the defect indicated that a Burow's advancement flap would result in the \\nleast disturbance of the position and function of the surrounding anatomic structures, and provide the best result.  The technique, its benefits, alternatives and risks were discussed with the patient.  The patient underwent the procedure as follows: The patient was positioned supine on the operating room table.  The area of the defect and the surrounding skin were anesthetized with buffered 1% lidocaine with epinephrine.  The area was washed with chlorhexidine.  Sterile drapes were applied. \\n\\nThe wound edges were debeveled and extensively undermined.  A Burow's advancement flap was designed, incised, and elevated.  Hemostasis was achieved with spot electrodesiccation.  The flap was advanced into position to cover the primary defect and a key suture was used to secure the flap into place.  Additional buried interrupted sutures were used to close the arms of the flap by the rule of halves to share out the unequal lengths.  The standing cones so created by the design of the flap was removed to fat by triangulation.  Final cutaneous approximation was achieved.  The closure was manually tested and found to be sound for strength and hemostasis.

## 2022-07-01 NOTE — PROVIDER CONTACT NOTE (OTHER) - ACTION/TREATMENT ORDERED:
Final Anesthesia Post-op Assessment    Patient: Efrain Rider  Procedure(s) Performed: RIGHT TOTAL KNEE ARTHROPLASTY - RIGHT  Anesthesia type: Spinal    Vitals Value Taken Time   Temp 36.2 °C (97.2 °F) 07/01/22 1349   Pulse 94 07/01/22 1349   Resp 16 07/01/22 1349   SpO2 95 % 07/01/22 1349   /84 07/01/22 1349         Patient Location: Phase II  Post-op Vital Signs:stable  Level of Consciousness: participates in exam, alert, awake and oriented  Respiratory Status: spontaneous ventilation  Cardiovascular blood pressure returned to baseline and stable  Hydration: euvolemic  Pain Management: well controlled  Vomiting: none  Nausea: None  Airway Patency:patent  Post-op Assessment: awake, alert, appropriately conversant, or baseline, no complications, patient tolerated procedure well with no complications and no evidence of recall      No complications documented.   
ALMA Escalante called unit and stated CBC tonight was no necessary since H/H is okay for patient and the 0600 CBC is acceptable

## 2022-08-05 NOTE — CONSULT NOTE ADULT - CARDIOVASCULAR
ambulated to bathroom/darkened lights/plan of care explained/po fluids offered/repositioned/warm blanket provided negative Regular rate & rhythm, normal S1, S2; no murmurs, gallops or rubs; no S3, S4

## 2022-09-01 NOTE — ED PROVIDER NOTE - NS ED MD EM SELECTION
Alert and interactive, no focal deficits, moving all extremities, normal patellar reflexes b/l [id number] Comprehensive

## 2023-06-09 NOTE — PATIENT PROFILE ADULT - NSPROMEDSHERBAL_GEN_A_NUR
Pt with hx of CVA last year 2022 with residual L sided weakness. Pt also with questionable hx of PVD, was seen by podiatrist on 5/21 who mentioned pt had diminished flow in her feet. No calf tenderness on exam. CTH non contrast on 6/2 showed no acute intracranial hemorrhage, mass effect, or demarcated recent infarction and small vessel ischemic change throughout cerebral white matter and deep gray/white matter lacunae.  Plan:   - Will c/w DVT ppx with Heparin SubQ as discussed below  - Consider Aspirin therapy for hx of stroke
no

## 2023-08-30 NOTE — PHYSICAL THERAPY INITIAL EVALUATION ADULT - ADL SKILLS, REHAB EVAL
Patient called stating Dr. Isamar Wilson wants him to be seen in two weeks which puts the appointment on 9/14. Dr. Isamar Wilson asked him to speak with you to arrange. Please call him when you get a chance.
needs device and assist

## 2023-11-13 NOTE — ED PROVIDER NOTE - PSH
no hesitancy showing improved confidence and decreased fall risk. 6. Pt will be able to walk while doing a cognitive task with < 10% difference showing better multi-tasking ability needed for navigating a store or community environments. Plan: [x] Continue per plan of care. [] Other: See below            Treatment Charges: Mins Units   []  Modalities     []  Ther Exercise     []  Manual Therapy     []  Ther Activities     []  Aquatics     [x]  Neuromuscular 46 3   [] Vasocompression     [] Gait Training     [] Dry needling        [] 1 or 2 muscles        [] 3 or more muscles     []  Other     Total Treatment time 46 3     Time In: 3:31pm    Time Out: 3:15pm    Electronically signed by:  TONY Casillas      Cosigned: PT evaluation/treatment is completed by Bunny Desouza under the direct supervision of co-signing therapist, who agrees with all documentation and evaluation/treatment.    Luis Antonio Sarkar, PT, DPT   #[id number]
No significant past surgical history
